# Patient Record
Sex: FEMALE | Race: WHITE | NOT HISPANIC OR LATINO | Employment: OTHER | URBAN - METROPOLITAN AREA
[De-identification: names, ages, dates, MRNs, and addresses within clinical notes are randomized per-mention and may not be internally consistent; named-entity substitution may affect disease eponyms.]

---

## 2017-01-19 ENCOUNTER — ALLSCRIPTS OFFICE VISIT (OUTPATIENT)
Dept: OTHER | Facility: OTHER | Age: 62
End: 2017-01-19

## 2017-02-06 ENCOUNTER — GENERIC CONVERSION - ENCOUNTER (OUTPATIENT)
Dept: OTHER | Facility: OTHER | Age: 62
End: 2017-02-06

## 2017-02-06 ENCOUNTER — HOSPITAL ENCOUNTER (OUTPATIENT)
Dept: RADIOLOGY | Facility: HOSPITAL | Age: 62
Discharge: HOME | End: 2017-02-06
Attending: FAMILY MEDICINE
Payer: COMMERCIAL

## 2017-02-06 ENCOUNTER — TRANSCRIBE ORDERS (OUTPATIENT)
Dept: ADMINISTRATIVE | Facility: HOSPITAL | Age: 62
End: 2017-02-06

## 2017-02-06 DIAGNOSIS — D86.9 SARCOIDOSIS: ICD-10-CM

## 2017-02-06 PROCEDURE — 71020 HB CHEST X-RAY 2VW FRONTAL&LATL: CPT

## 2017-03-24 ENCOUNTER — ALLSCRIPTS OFFICE VISIT (OUTPATIENT)
Dept: OTHER | Facility: OTHER | Age: 62
End: 2017-03-24

## 2017-03-25 ENCOUNTER — APPOINTMENT (OUTPATIENT)
Dept: LAB | Facility: HOSPITAL | Age: 62
End: 2017-03-25
Payer: COMMERCIAL

## 2017-03-25 ENCOUNTER — TRANSCRIBE ORDERS (OUTPATIENT)
Dept: ADMINISTRATIVE | Facility: HOSPITAL | Age: 62
End: 2017-03-25

## 2017-03-25 DIAGNOSIS — M54.50 LOW BACK PAIN: ICD-10-CM

## 2017-03-25 DIAGNOSIS — D86.9 SARCOIDOSIS: ICD-10-CM

## 2017-03-25 DIAGNOSIS — M54.16 RADICULOPATHY OF LUMBAR REGION: ICD-10-CM

## 2017-03-25 LAB
CRP SERPL QL: <3 MG/L
ERYTHROCYTE [SEDIMENTATION RATE] IN BLOOD: 14 MM/HOUR (ref 0–20)

## 2017-03-25 PROCEDURE — 36415 COLL VENOUS BLD VENIPUNCTURE: CPT

## 2017-03-25 PROCEDURE — 87389 HIV-1 AG W/HIV-1&-2 AB AG IA: CPT

## 2017-03-25 PROCEDURE — 86235 NUCLEAR ANTIGEN ANTIBODY: CPT

## 2017-03-25 PROCEDURE — 81374 HLA I TYPING 1 ANTIGEN LR: CPT

## 2017-03-25 PROCEDURE — 86430 RHEUMATOID FACTOR TEST QUAL: CPT

## 2017-03-25 PROCEDURE — 85652 RBC SED RATE AUTOMATED: CPT

## 2017-03-25 PROCEDURE — 86038 ANTINUCLEAR ANTIBODIES: CPT

## 2017-03-25 PROCEDURE — 86140 C-REACTIVE PROTEIN: CPT

## 2017-03-25 PROCEDURE — 86200 CCP ANTIBODY: CPT

## 2017-03-25 PROCEDURE — 82164 ANGIOTENSIN I ENZYME TEST: CPT

## 2017-03-26 LAB — CCP IGA+IGG SERPL IA-ACNC: 5 UNITS (ref 0–19)

## 2017-03-27 LAB
ACE SERPL-CCNC: 18 U/L (ref 14–82)
ENA SS-A AB SER-ACNC: <0.2 AI (ref 0–0.9)
ENA SS-B AB SER-ACNC: <0.2 AI (ref 0–0.9)
HIV 1+2 AB+HIV1 P24 AG SERPL QL IA: NORMAL
RHEUMATOID FACT SER QL LA: NEGATIVE
RYE IGE QN: NEGATIVE

## 2017-03-31 LAB — HLA-B27 QL NAA+PROBE: NEGATIVE

## 2017-07-29 ENCOUNTER — TRANSCRIBE ORDERS (OUTPATIENT)
Dept: ADMINISTRATIVE | Facility: HOSPITAL | Age: 62
End: 2017-07-29

## 2017-07-29 ENCOUNTER — HOSPITAL ENCOUNTER (OUTPATIENT)
Dept: RADIOLOGY | Facility: HOSPITAL | Age: 62
Discharge: HOME | End: 2017-07-29
Attending: FAMILY MEDICINE
Payer: COMMERCIAL

## 2017-07-29 ENCOUNTER — APPOINTMENT (OUTPATIENT)
Dept: LAB | Facility: HOSPITAL | Age: 62
End: 2017-07-29
Attending: FAMILY MEDICINE
Payer: COMMERCIAL

## 2017-07-29 DIAGNOSIS — D86.9 SARCOIDOSIS: ICD-10-CM

## 2017-07-29 DIAGNOSIS — Z79.899 OTHER LONG TERM (CURRENT) DRUG THERAPY: ICD-10-CM

## 2017-07-29 LAB
ALBUMIN SERPL BCP-MCNC: 3.7 G/DL (ref 3.5–5)
ALP SERPL-CCNC: 56 U/L (ref 46–116)
ALT SERPL W P-5'-P-CCNC: 29 U/L (ref 12–78)
AST SERPL W P-5'-P-CCNC: 21 U/L (ref 5–45)
BILIRUB DIRECT SERPL-MCNC: 0.08 MG/DL (ref 0–0.2)
BILIRUB SERPL-MCNC: 0.3 MG/DL (ref 0.2–1)
PROT SERPL-MCNC: 6.6 G/DL (ref 6.4–8.2)

## 2017-07-29 PROCEDURE — 36415 COLL VENOUS BLD VENIPUNCTURE: CPT

## 2017-07-29 PROCEDURE — 80076 HEPATIC FUNCTION PANEL: CPT

## 2017-07-29 PROCEDURE — 71020 HB CHEST X-RAY 2VW FRONTAL&LATL: CPT

## 2017-07-31 ENCOUNTER — GENERIC CONVERSION - ENCOUNTER (OUTPATIENT)
Dept: OTHER | Facility: OTHER | Age: 62
End: 2017-07-31

## 2017-10-16 ENCOUNTER — GENERIC CONVERSION - ENCOUNTER (OUTPATIENT)
Dept: OTHER | Facility: OTHER | Age: 62
End: 2017-10-16

## 2017-11-30 ENCOUNTER — ALLSCRIPTS OFFICE VISIT (OUTPATIENT)
Dept: OTHER | Facility: OTHER | Age: 62
End: 2017-11-30

## 2017-12-05 NOTE — PROGRESS NOTES
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   3  Cardiomyopathy (425 4) (I42 9)   4  Chronic low back pain (724 2,338 29) (M54 5,G89 29)   5  Sarcoidosis (135) (D86 9)    Plan  Depression    · Sertraline HCl - 50 MG Oral Tablet; take 1 tablet once a day  High risk medication use    · (1) HEPATIC FUNCTION PANEL; Status:Active - Retrospective By Protocol Authorization; Requested for:Recurring Schedule: 3/1/2018; 6/1/2018; 9/1/2018; 12/1/2018 ; Irritable bowel syndrome    · Loperamide HCl - 2 MG Oral Capsule; One tablet by mouth in the morning & 1/2  tablet in the evening   · Hyoscyamine Sulfate ER 0 375 MG Oral Tablet Extended Release 12 Hour; TAKE  1 TABLET EVERY 12 HOURS AS NEEDED  Sarcoidosis    · * XR CHEST PA & LATERAL; Status:Active - Retrospective By Protocol Authorization; Requested for:Recurring Schedule: 1/1/2018; 7/1/2018 ; Discussion/Summary    No change of medications allergic to vaccilnes  Chief Complaint  Patient is here today for follow up of chronic conditions described in HPI  History of Present Illness  The patient is being seen for follow-up of chronic back pain  The patient reports doing poorly  Comorbid Illnesses: depression  She has had no significant interval events  The patient is currently asymptomatic  No associated symptoms are reported  Medications:  the patient is adherent to her medication regimen, but she denies medication side effects  The patient is being seen for follow-up of sarcoidosis  The patient reports doing well  She has had no significant interval events  The patient is currently asymptomatic  Associated symptoms: no weight loss, no anorexia and no night sweats  Medications:  the patient is adherent to her medication regimen, but she denies medication side effects  Disease management:  The patient is doing well with her goals         The patient is being seen for a routine clinic follow-up of dilated cardiomyopathy  The patient is currently asymptomatic  No associated symptoms are reported  Review of Systems    Constitutional: No fever, no chills, feels well, no tiredness, no recent weight gain or weight loss  Eyes: No complaints of eye pain, no red eyes, no eyesight problems, no discharge, no dry eyes, no itching of eyes  ENT: no complaints of earache, no loss of hearing, no nose bleeds, no nasal discharge, no sore throat, no hoarseness  Cardiovascular: dilated cardilomyopathy  Respiratory: No complaints of shortness of breath, no wheezing, no cough, no SOB on exertion, no orthopnea, no PND  Gastrointestinal: No complaints of abdominal pain, no constipation, no nausea or vomiting, no diarrhea, no bloody stools  Genitourinary: No complaints of dysuria, no incontinence, no pelvic pain, no dysmenorrhea, no vaginal discharge or bleeding  Musculoskeletal: low back pain  Integumentary: No complaints of skin rash or lesions, no itching, no skin wounds, no breast pain or lump  Neurological: No complaints of headache, no confusion, no convulsions, no numbness, no dizziness or fainting, no tingling, no limb weakness, no difficulty walking  ROS reviewed  Active Problems    1  Back Pain   2  Bipolar 2 disorder (296 89) (F31 81)   3  Carbuncle On The Toe(S) (680 7)   4  Cardiomyopathy (425 4) (I42 9)   5  Cervical cancer screening (V76 2) (Z12 4)   6  Chronic low back pain (724 2,338 29) (M54 5,G89 29)   7  Chronic lumbar radiculopathy (724 4) (M54 16)   8  Depression (311) (F32 9)   9  Dry Skin (782 9)   10  High risk medication use (V58 69) (Z79 899)   11  Irritable bowel syndrome (564 1) (K58 9)   12  Osteomyelitis of toe (730 27) (M86 9)   13  Paroxysmal digital cyanosis (443 0) (I73 00)   14  Sarcoidosis (135) (D86 9)   15  Screening for colorectal cancer (V76 51) (Z12 11,Z12 12)   16  TMJ Appearance Right    Past Medical History    1  Diarrhea (787 91) (R19 7)   2   History of hypertension (V12 59) (Z86 79)    The active problems and past medical history were reviewed and updated today  Surgical History    1  History of Anterior Colporrhaphy, Repair Of Cystocele   2  History of Bladder Surgery   3  History of Cataract Extraction   4  History of Hysterectomy   5  History of Oophorectomy   6  History of Repair Of Retinal Detachment Left Eye    The surgical history was reviewed and updated today  Family History  Mother    1  Family history of Abd Aorta Aneurysm Repair 2 Dock Limbs W/ Visc Extens Prosth  Father    2  Family history of hypertension (V17 49) (Z82 49)   3  Family history of Subarachnoid hemorrhage    The family history was reviewed and updated today  Social History    · Never a smoker   · Never Drank Alcohol  The social history was reviewed and updated today  The social history was reviewed and is unchanged  Current Meds   1  Calcium + D TABS; TAKE 2 TABLET Twice daily At 8AM-8PM Recorded   2  [employer] Fish Oil CAPS; TAKE 2 CAPSULE Daily Recorded   3  Daily Value Multivitamin Oral Tablet; Take 2 tablet daily Vianca-Paty Som vitamin w/iron   Recorded   4  Hyoscyamine Sulfate ER 0 375 MG Oral Tablet Extended Release 12 Hour; TAKE 1   TABLET EVERY 12 HOURS AS NEEDED; Therapy: 45PVK2016 to (Evaluate:97Dbz6989)  Requested for: 23HLQ7357; Last   Rx:04Jan2017 Ordered   5  Imodium A-D 2 MG Oral Tablet; 1  in am and 1/2 tab at night  Requested for: 83ADQ0116;   Last Rx:19Jan2017 Ordered   6  Mavik 2 MG TABS (Trandolapril); Take 1 tablet daily Recorded   7  Refresh Tears SOLN Recorded   8  Sertraline HCl - 50 MG Oral Tablet; take 1 tablet once a day; Therapy: 98HAD1777 to (Evaluate:02Czr8504)  Requested for: 29EJE6272; Last   Rx:04Jan2017 Ordered   9  Topiramate 25 MG Oral Tablet; TAKE 1 TABLET Bedtime; Therapy: 10PBX3992 to (Evaluate:22Mar2018)  Requested for: 28Mar2017; Last   Rx:27Mar2017 Ordered   10   Toprol XL 50 MG Oral Tablet Extended Release 24 Hour (Metoprolol Succinate ER); 1/2    tablet BID; Therapy: 97SHJ0015 to (Last Rx:71Iqh2988)  Requested for: 49COS4282 Ordered   11  Tylenol Arthritis Pain 650 MG TBCR; TAKE 1 TABLET Twice daily PRN Mild pain Recorded   12  Vitamin D 1000 UNIT CAPS; Take as directed Recorded    The medication list was reviewed and updated today  Allergies    1  Fluzone INJ    2  Dust   3  Grass   4  Mold   5  Trees    Vitals  Vital Signs    Recorded: 62GIY1439 69:56SR   Systolic 971   Diastolic 70   Height 5 ft 8 in   Weight 146 lb 3 2 oz   BMI Calculated 22 23   BSA Calculated 1 79     Physical Exam    Constitutional   General appearance: No acute distress, well appearing and well nourished  Eyes   Conjunctiva and lids: No swelling, erythema or discharge  Pupils and irises: Equal, round and reactive to light  Ears, Nose, Mouth, and Throat   External inspection of ears and nose: Normal     Otoscopic examination: Tympanic membranes translucent with normal light reflex  Canals patent without erythema  Nasal mucosa, septum, and turbinates: Normal without edema or erythema  Oropharynx: Normal with no erythema, edema, exudate or lesions  Pulmonary   Respiratory effort: No increased work of breathing or signs of respiratory distress  Auscultation of lungs: Clear to auscultation  Cardiovascular   Palpation of heart: Normal PMI, no thrills  Auscultation of heart: Normal rate and rhythm, normal S1 and S2, without murmurs  Examination of extremities for edema and/or varicosities: Normal     Carotid pulses: Normal     Abdomen   Abdomen: Non-tender, no masses  Liver and spleen: No hepatomegaly or splenomegaly  Lymphatic   Palpation of lymph nodes in neck: No lymphadenopathy  Musculoskeletal   Gait and station: Normal     Digits and nails: Normal without clubbing or cyanosis      Inspection/palpation of joints, bones, and muscles: Normal     Skin   Skin and subcutaneous tissue: Normal without rashes or lesions  Neurologic   Cranial nerves: Cranial nerves 2-12 intact  Reflexes: 2+ and symmetric  Sensation: No sensory loss  Psychiatric   Orientation to person, place, and time: Normal     Mood and affect: Normal          Health Management  Health Maintenance   * Dexa Scan Axial Skel (Hip, Pelvis, Spine); every 2 years; Last 90Coa6936;  Next Due: 27Prd4181;  Near Due    Signatures   Electronically signed by : Lorena De Santiago DO; Nov 30 2017 12:12PM EST                       (Author)

## 2017-12-12 ENCOUNTER — HOSPITAL ENCOUNTER (OUTPATIENT)
Dept: MAMMOGRAPHY | Facility: HOSPITAL | Age: 62
Discharge: HOME | End: 2017-12-12
Attending: FAMILY MEDICINE
Payer: COMMERCIAL

## 2017-12-12 ENCOUNTER — GENERIC CONVERSION - ENCOUNTER (OUTPATIENT)
Dept: OTHER | Facility: OTHER | Age: 62
End: 2017-12-12

## 2017-12-12 ENCOUNTER — GENERIC CONVERSION - ENCOUNTER (OUTPATIENT)
Dept: FAMILY MEDICINE CLINIC | Facility: CLINIC | Age: 62
End: 2017-12-12

## 2017-12-12 DIAGNOSIS — Z12.31 ENCOUNTER FOR SCREENING MAMMOGRAM FOR MALIGNANT NEOPLASM OF BREAST: ICD-10-CM

## 2017-12-12 PROCEDURE — G0202 SCR MAMMO BI INCL CAD: HCPCS

## 2017-12-12 PROCEDURE — 77063 BREAST TOMOSYNTHESIS BI: CPT

## 2018-01-01 DIAGNOSIS — D86.9 SARCOIDOSIS: ICD-10-CM

## 2018-01-10 NOTE — RESULT NOTES
Verified Results  VAS UPPER EXTREMITY ARTERIAL, LIMITED, UNILATERAL, ALLENS TEST [id number] 11:58AM Gill Daniel Order Number: [id number]     Test Name Result Flag Reference   VAS UPPER EXTREMITY ARTERIAL, LIMITED, UNILATERAL, [facility] TEST (Report)     [facility] REPORT   CLINICAL:   Indications: Left 3rd digit numbness and coldness  Patient states discoloration of the digit last night  Patient states it turned   from red to blue and was followed by numbness  Hx of sarcoidosis and cardiomyopathy  The patient has no history of hypertension or diabetes  Right Pressure: 114/ mm Hg, Left Pressure: / mm Hg  FINDINGS:      Segment       Right      Left                      Pressure (mmHg) Impression Pressure (mmHg) PSV (cm/s)    Subclavian                                 88    Upper Arm            114             119          Forearm             112              94          Axillary, 2nd Part          Normal                58    Prox  Brachial            Normal                64    Dist  Radial Artery          Normal                38    Mid Brachial             Normal               110    Dist  Brachial            Normal                96    Dist  Ulnar Artery          Stenosis               44    Prox  Radial             Normal                54    Mid Radial              Normal                75    1st Digit             93              59          Prox  Ulnar              Normal                59    Mid  Ulnar              Normal                49    Axillary               100%                 58             CONCLUSION:   Impression   RIGHT   FA/UA index is 0 98, normal   The third finger pressure is 98mmHg  LEFT   There is no evidence of significant arterial occlusive disease  FA/UA index is 0 79,   The third finger pressure is 59mmHg  mildly decreased compared with the right           Tech Note: Preliminary report given to Dr Alyssa Marley ,patient will be scheduled to   see Vascular surgeon        SIGNATURE:   Electronically Signed by: José Nguyen MD on 2016-02-22 10:18:50 PM

## 2018-01-10 NOTE — RESULT NOTES
Verified Results  * XR CHEST PA & LATERAL [id number] 10:48AM Vitor Moreno Order Number: [id number]     Test Name Result Flag Reference   XR CHEST PA & LATERAL (Report)     CHEST     INDICATION: Follow up sarcoidosis     COMPARISON: None     VIEWS: PA and lateral; 2 images     FINDINGS:     The cardiomediastinal silhouette is unremarkable  No developing hilar or paratracheal lymph node enlargement seen  The lungs are clear  No pulmonary nodules  No pleural effusions  Bony thorax unremarkable  IMPRESSION:     No active pulmonary disease          Workstation performed: [id number]     Signed by:   Leonor Patel MD   2/6/17

## 2018-01-12 VITALS
DIASTOLIC BLOOD PRESSURE: 78 MMHG | HEIGHT: 68 IN | HEART RATE: 80 BPM | SYSTOLIC BLOOD PRESSURE: 128 MMHG | WEIGHT: 141 LBS | BODY MASS INDEX: 21.37 KG/M2

## 2018-01-12 NOTE — RESULT NOTES
Verified Results  (1) ACUTE HEPATITIS PANEL [id number] 09:53AM [place]     Test Name Result Flag Reference   HEPATITIS B SURFACE ANTIGEN Non-reactive  Non-reactive, NonReactive - Confirmed   HEPATITIS A IGM ANTIBODY Non-reactive  Non-reactive, Equivocal-Suggest Recollect   HEPATITIS C ANTIBODY Non-reactive  Non-reactive   HEPATITIS B CORE IGM ANTIBODY Non-reactive  Non-reactive

## 2018-01-12 NOTE — PROGRESS NOTES
Assessment    1  Sarcoidosis (135) (D86 9)   2  Chronic lumbar radiculopathy (724 4) (M54 16)   3  Chronic low back pain (724 2,338 29) (M54 5,G89 29)   4  Cardiomyopathy (425 4) (I42 9)   5  Bipolar 2 disorder (296 89) (F31 81)   6  Depression (311) (F32 9)   7  Irritable bowel syndrome (564 1) (K58 9)    Plan  Chronic low back pain    · Call [phone] if: The pain seems worse ; Status:Complete;   Done: 01PVL6253   · Call [phone] if: The symptoms seem worse ; Status:Complete;   Done:  07ZGV0777   · Call [phone] if: You have questions or concerns about your problem ;  Status:Complete;   Done: 48VID9914  Chronic low back pain, Chronic lumbar radiculopathy, Sarcoidosis    · (1) KASSANDRA SCREEN W/REFLEX TO TITER/PATTERN; Status:Active; Requested  for:24Mar2017;    · (1) ANGIOTENSIN CONVERSIN ENZYME; Status:Active; Requested for:24Mar2017;    · (1) C-REACTIVE PROTEIN; Status:Active; Requested CXT:57DPO2330;    · (1) CYCLIC CITRULLINATED PEPTIDE; Status:Active; Requested for:24Mar2017;    · (1) HIV AG/AB COMBO, 4TH GEN; [Do Not Release]; Status:Active; Requested  for:24Mar2017;    · (1) HLA B27; Status:Active; Requested for:24Mar2017;    · (1) RHEUMATOID FACTOR SCREEN; Status:Active; Requested for:24Mar2017;    · (1) SED RATE; Status:Active; Requested for:24Mar2017;    · (1) SJOGRENS ANTIBODIES; Status:Active; Requested for:24Mar2017;    · Follow-up PRN Evaluation and Treatment  Follow-up  Status: Complete  Done:  78GOT8471    Discussion/Summary    60year old female PMH of chronic back pain since childhood and hx of sarcoidosis here for evaluation of her back pain  At this time, patient does not report significant pain in any of her other joints  She has been seeing pain management Dr Lillian Melton for epidural injections which has been providing relief of pain   On exam, she does display some mild paraspinal tenderness of her lower back region, however no other significant joint tenderness, swelling or active synovitis noted  On review of her previous MRI records that patient brought in today, it is noted that patient does have significant degenerative disc disease from L1-L5  There are two XRAY reports that state DJD from L1-L6, however personally reviewed this and it does not appear that the patient has an additional lumbar vertebrae  At this time, her sarcoidosis does not seem to be active and is currently not on any medications for this  Her recent CXR shows a normal study  I suspect that her back pain is less likely from her underlying sarcoidosis, and more likely from degenerative disc disease/osteoarthritis  At this time, we will order additional blood studies to r/o any connective tissue disease/rheumatological d/o contributing to her sx  Should these results be consistent with an underlying rheumatological processes, will contact patient and have her follow up for further work up  Should the work up be negative, did discuss with the patient to follow up with her pain specialist for ongoing management of her pain  In the interim, patient will contact the office should she have any questions or concerns  Patient is able to Self-Care  Counseling  Rheumatology Counseling Documentation: The patient and patient's family was counseled regarding diagnostic results, instructions for management and impressions  Chief Complaint  establish care for "arthritis"      History of Present Illness  60year old female PMH of chronic back pain since childhood, at age 33 noted to have short leg syndrome on right by chiropractor, had manipulation with this and PT and was told her short leg syndrome improved  However, since then has always had pain and issues with her back  Has been seeing pain specialist for her back, has been getting steroid injections for this  Last injection was 12/2017 in the SI joint by Dr Fahad Tubbs in [facility]   Patient saw Dr Johnny Dan in [place] due to ongoing pain in her back, and felt that her back pain may be due to underlying sarcoidosis, and therefore was referred her for further evaluation  Patient reports that pain is most bothersome in lower back, and pain shoots down to R hip and to the R knee, occasionally can have radiation to the left side  Has had multiple MRIs of her L-spine and C-spine, patient has brought the reports with her today and these were reviewed  Takes Tylenol arthritis 650mg BID prn, which does provide relief of pain  Reports pain is worst when climbing up stairs/walking up the hill  Patient states was diagnosed with sarcoidosis approx 10 years, started off with idiopathic cardiomyopathy by her cardiologist, who suspected she may have had an underlying lymphoma or sarcoid, subsequently had a biopsy of her lymph nodes which showed sarcoidosis  Patient is currently not on medications for her sarcoidosis, she undergoes surveillance with biannual CXRs  Review of Systems    Constitutional: no fever, no recent weight gain, fatigue, no chills, no recent weight loss and no anorexia  HEENT: no blurred vision, no double vision, no amaurosis fugax, no eye pain, no erythema eye(s), no dryness mouth, no mouth sores, not feeling congested, no epistaxis and no sore throat    The patient presents with complaints of dryness of the eyes  Symptoms are improved by artificial tears  Cardiovascular: no chest pain or pressure, no palpitations present, no orthopnea and no swelling in the arms or legs  Respiratory: no unusual or persistent cough, no sputum, no shortness of breath, no hemoptysis and no wheezing  Gastrointestinal: no abdominal pain, no nausea, no vomiting, no dysphagia, no heartburn, no diarrhea, no constipation and no melena  Genitourinary: no dysuria, no hematuria, no increase in frequency and no feelings of urinary urgency  Musculoskeletal: arthralgias, pain while walking and head/neck/back pain, but no joint swelling and no myalgias  Integumentary no rash, no Raynaud's, no nodules, no alopecia, no nail changes, no skin thickening and no photosensitivity  Endocrine no heat or cold intolerance, no excessive sweating, no polyuria, no polydipsia and no polyphagia  Hematologic/Lymphatic: no unusual bleeding and no tendency for easy bruising  Neurological: no headache, no vertigo or dizziness, no tingling and no weakness  Psychiatric: anxiety, but no insomnia, no non-restorative sleep and no depression  ROS reviewed  Active Problems    1  Back Pain   2  Bipolar 2 disorder (296 89) (F31 81)   3  Carbuncle On The Toe(S) (680 7)   4  Cardiomyopathy (425 4) (I42 9)   5  Cervical cancer screening (V76 2) (Z12 4)   6  Depression (311) (F32 9)   7  Dry Skin (782 9)   8  High risk medication use (V58 69) (Z79 899)   9  Irritable bowel syndrome (564 1) (K58 9)   10  Osteomyelitis of toe (730 27) (M86 9)   11  Paroxysmal digital cyanosis (443 0) (I73 00)   12  Sarcoidosis (135) (D86 9)   13  Screening for colorectal cancer (V76 51) (Z12 11,Z12 12)    Past Medical History    1  Diarrhea (787 91) (R19 7)   2  History of hypertension (V12 59) (Z86 79)    Surgical History    1  History of Anterior Colporrhaphy, Repair Of Cystocele   2  History of Bladder Surgery   3  History of Cataract Extraction   4  History of Hysterectomy   5  History of Oophorectomy   6  History of Repair Of Retinal Detachment Left Eye    Family History  Mother    1  Family history of Abd Aorta Aneurysm Repair 2 Dock Limbs W/ Visc Extens Prosth  Father    2  Family history of hypertension (V17 49) (Z82 49)   3  Family history of Subarachnoid hemorrhage    Social History    · Never a smoker   · Never Drank Alcohol    Current Meds   1  Calcium + D TABS; TAKE 2 TABLET Twice daily At 8AM-8PM Recorded   2  [employer] Fish Oil CAPS; TAKE 2 CAPSULE Daily Recorded   3  Daily Value Multivitamin Oral Tablet; Take 2 tablet daily Vianca-Paty Shaklee vitamin w/iron   Recorded   4   Hyoscyamine Sulfate ER 0 375 MG Oral Tablet Extended Release 12 Hour; TAKE 1   TABLET EVERY 12 HOURS AS NEEDED; Therapy: 94YCG2981 to (Evaluate:29Dec2017)  Requested for: 43DBC7650; Last   Rx:04Jan2017 Ordered   5  Imodium A-D 2 MG Oral Tablet; 1  in am and 1/2 tab at night  Requested for: 11HEX0590;   Last Rx:19Jan2017 Ordered   6  Mavik 2 MG Oral Tablet; Take 1 tablet daily Recorded   7  Refresh Tears SOLN Recorded   8  Sertraline HCl - 50 MG Oral Tablet; take 1 tablet once a day; Therapy: 82QOT2531 to (Evaluate:29Dec2017)  Requested for: 28NFK1910; Last   Rx:04Jan2017 Ordered   9  Topiramate 25 MG Oral Tablet; TAKE 1 TABLET Bedtime; Therapy: 77RMV2802 to (Evaluate:07Jan2017)  Requested for: 66RBS0673; Last   Rx:13Jan2016 Ordered   10  Toprol XL 50 MG Oral Tablet Extended Release 24 Hour; 1/2 tablet BID; Therapy: 27CVN4616 to (Last Rx:09Kiw4771)  Requested for: 22OCR3558 Ordered   11  Tylenol Arthritis Pain 650 MG TBCR; TAKE 1 TABLET Twice daily PRN Mild pain Recorded   12  Vitamin D 1000 UNIT CAPS; Take as directed Recorded    Allergies    1  Fluzone INJ    2  Dust   3  Grass   4  Mold   5  Trees    Vitals  Signs   Recorded: 82OUL4861 09:46AM   Heart Rate: 80  Systolic: 034  Diastolic: 78  Height: 5 ft 8 in  Weight: 141 lb   BMI Calculated: 21 44    Physical Exam    Constitutional   General appearance: No acute distress, well appearing and well nourished  Eyes   Conjunctiva and lids: No swelling, erythema or discharge  Ears, Nose, Mouth, and Throat   Oropharynx: Normal with no erythema, edema, exudate lesions, or ulcers  Pulmonary   Respiratory effort: No increased work of breathing or signs of respiratory distress  Auscultation of lungs: Clear to auscultation  Cardiovascular   Auscultation of heart: Normal rate and rhythm, normal S1 and S2, without murmurs  Examination of extremities for edema and/or varicosities: Normal     Lymphatic   Palpation of lymph nodes in neck: No lymphadenopathy  Psychiatric   Orientation to person, place, and time: Normal     Mood and affect: Normal         Right Upper Extremity: Normal examination  Normal ROM  Normal strength  Left Upper Extremity: Normal examination  Normal ROM  Normal strength  Right Lower Extremity: Normal examination  Normal ROM  Normal strength  Left Lower Extremity: Normal examination  Normal ROM  Constitutional - General appearance: Normal    Musculoskeletal - Gait and station: Normal  Digits and nails: Normal  Joints, bones, and muscles: Abnormal  (tenderness to palpation of lower back) Muscle strength/tone: Normal    Skin - Skin and subcutaneous tissue: Normal    Neurologic - Sensation: Normal      Right hand: All MCP, PIP and DIP joints are normal  Left Hand: All MCP, PIP and DIP joints are normal       Results/Data  * XR CHEST PA & LATERAL [id number] 10:48AM Dunia Chawla   TW Order Number: [id number]     Test Name Result Flag Reference   XR CHEST PA & LATERAL (Report)     CHEST     INDICATION: Follow up sarcoidosis     COMPARISON: None     VIEWS: PA and lateral; 2 images     FINDINGS:     The cardiomediastinal silhouette is unremarkable  No developing hilar or paratracheal lymph node enlargement seen  The lungs are clear  No pulmonary nodules  No pleural effusions  Bony thorax unremarkable  IMPRESSION:     No active pulmonary disease          Workstation performed: [id number]     Signed by:   Simone Gibbs MD   2/6/17     (1) ACUTE HEPATITIS PANEL [id number] 09:53AM Dunia Chawla     Test Name Result Flag Reference   HEPATITIS B SURFACE ANTIGEN Non-reactive  Non-reactive, NonReactive - Confirmed   HEPATITIS A IGM ANTIBODY Non-reactive  Non-reactive, Equivocal-Suggest Recollect   HEPATITIS C ANTIBODY Non-reactive  Non-reactive   HEPATITIS B CORE IGM ANTIBODY Non-reactive  Non-reactive     * XR SPINE LUMBAR MINIMUM 4 VIEWS NON INJURY [id number] 10:48AM EPIC, Provider   Test ordered by: Herminia Simeon Name Result Flag Reference   XR SPINE LUMBAR MINIMUM 4 VIEWS (Report)     LUMBAR SPINE     INDICATION: Chronic low back pain     COMPARISON: March 3, 2009     VIEWS: AP, lateral and bilateral oblique projections; 4 images     FINDINGS: There are 6 nonrib-bearing vertebral segments labeled L1 through L6  Alignment is unremarkable  There is no radiographic evidence of acute fracture or destructive osseous lesion  Narrowing of the L3-4, L4-5 and L5 L6 intervertebral disc spaces is seen, similar to prior        Visualized soft tissues appear unremarkable  IMPRESSION:   6 nonrib-bearing segments  Multilevel degenerative disc disease, L2-3 through L5-L6  Workstation performed: [id number]     Signed by:   Conchita Vargas MD   9/7/16       Attending Note  Attending Note: I interviewed and examined the patient, I discussed the case with the Resident and reviewed the Resident's note, I supervised the Resident and I agree with the Resident management plan as it was presented to me  Level of Participation: I was present in clinic and examined the patient  Patient's History: Ms Alondra Ley is a 41-year-old  female who presents the office with chronic lower back pain since childhood, which has worsened into her adulthood  She was diagnosed with "short leg syndrome" at the age of 33 by a chiropractor and underwent manipulation for over a year before improvement  She also had concurrent physical therapy  Since that time, she has had persistent pain and issues with her back  She has been seeing a pain management specialist for her back and has been receiving corticosteroid injections  Her last injection occurred in December 2016 in the SI joint  She did follow-up with her primary care physician for her ongoing issues with her back, and was told that her back pain may be due to her known history of sarcoidosis  She was therefore referred to rheumatology for further evaluation   She states that she was diagnosed with sarcoidosis incidentally when she was found to have enlarged lymph nodes on a chest x-ray  A biopsy confirmed the diagnosis  She has never been on any treatment for sarcoidosis, as she has been asymptomatic  She is followed with chest x-rays every 6 months, which have all been negative in recent years  In addition to her lower back pain, she does report radiation of pain into her right hip and right knee as well as occasional radiation into her left side  She has been utilizing Tylenol arthritis 650 mg twice a day as needed for pain with some relief  She reports that her pain is worse when climbing stairs or walking up hills  She also carries a diagnosis of an idiopathic cardiomyopathy  During the workup for this is when sarcoidosis was found  Key Parts of the Exam: On exam, there is no active synovitis  She does have mild osteoarthritic changes of the hands, as well as crepitus of the bilateral knees  There is significant paraspinal spasm noted in the lumbar spine area  Review of multiple x-rays and MRIs of her back did show multilevel degenerative changes  A recent acute hepatitis panel was negative  Her most recent chest x-ray was also negative for any hilar adenopathy  Diagnosis and Plan: At this time, her history, exam, laboratory, and imaging studies do appear most consistent with chronic lower back pain most likely due to degenerative disc disease and degenerative joint disease  It would be fairly unusual for sarcoidosis to only involve the lumbar spine, as sarcoid arthritis typically involves smaller joints, including the hands, wrists, ankles, and feet  She has no evidence of synovitis in any of these areas  Nevertheless, I will check several laboratory studies to further investigate the cause of her symptoms  I advised her to contact me approximately 1 week after the laboratory studies are drawn so we may review them and make further clinical decisions   If any evidence of an inflammatory process is noted, we will treat it accordingly  Otherwise, we will defer management to Dr Vivek Hall of pain management  She will call if she has any additional questions or concerns  I agree with the Resident's note        Signatures   Electronically signed by : Fab Guerin DO; Mar 24 2017  4:26PM EST                       (Author)

## 2018-01-13 VITALS
WEIGHT: 146.2 LBS | DIASTOLIC BLOOD PRESSURE: 70 MMHG | SYSTOLIC BLOOD PRESSURE: 114 MMHG | HEIGHT: 68 IN | BODY MASS INDEX: 22.16 KG/M2

## 2018-01-14 VITALS
WEIGHT: 144.8 LBS | DIASTOLIC BLOOD PRESSURE: 62 MMHG | BODY MASS INDEX: 21.95 KG/M2 | HEIGHT: 68 IN | SYSTOLIC BLOOD PRESSURE: 108 MMHG

## 2018-01-14 NOTE — RESULT NOTES
Verified Results  (1) HEPATIC FUNCTION PANEL [id number] 11:15AM Jamari Thurman    Order Number: [id number]     Test Name Result Flag Reference   ALBUMIN 3 7 g/dL  3 5-5 0   - Patient Instructions:  This is a non fasting blood test  Please drink two glasses of water the morning of test    ALK PHOSPHATAS 56 U/L     ALT (SGPT) 29 U/L  12-78   AST(SGOT) 21 U/L  5-45   BILI, DIRECT 0 08 mg/dL  0 00-0 20   BILI, TOTAL 0 30 mg/dL  0 20-1 00   TOTAL PROTEIN 6 6 g/dL  6 4-8 2

## 2018-01-16 NOTE — RESULT NOTES
Verified Results  (1) ACUTE HEPATITIS PANEL [id number] 11:44AM Claire Lemon     Test Name Result Flag Reference   HEPATITIS B SURFACE ANTIGEN Non-reactive  Non-reactive, NonReactive - Confirmed   HEPATITIS A IGM ANTIBODY Non-reactive  Non-reactive, Equivocal-Suggest Recollect   HEPATITIS C ANTIBODY Non-reactive  Non-reactive   HEPATITIS B CORE IGM ANTIBODY Non-reactive  Non-reactive     * XR CHEST PA & LATERAL [id number] 11:09AM Carlyon Lemon     Test Name Result Flag Reference   XR CHEST PA & LATERAL (Report)     CHEST     INDICATION: Coagulation defect  Six-month follow-up     COMPARISON: July 25, 2015     VIEWS: PA and lateral; 2 images     FINDINGS:     The cardiomediastinal silhouette is unremarkable  The lungs are clear  No pleural effusions  Bony thorax unremarkable  IMPRESSION:     No active pulmonary disease                 Workstation performed: [id number]     Signed by:   Linda Benjamin MD   2/29/16

## 2018-01-16 NOTE — RESULT NOTES
Verified Results  (1) ACUTE HEPATITIS PANEL [id number] 11:53AM Eliud Mini     Test Name Result Flag Reference   HEPATITIS B SURFACE ANTIGEN Non-reactive  Non-reactive, NonReactive - Confirmed   HEPATITIS A IGM ANTIBODY Non-reactive  Non-reactive, Equivocal-Suggest Recollect   HEPATITIS C ANTIBODY Non-reactive  Non-reactive   HEPATITIS B CORE IGM ANTIBODY Non-reactive  Non-reactive     * XR CHEST PA & LATERAL [id number] 11:41AM Eliud Mini     Test Name Result Flag Reference   XR CHEST PA & LATERAL (Report)     CHEST     INDICATION: Right dilatation defect  History of sarcoidosis  COMPARISON: February 27, 2016     VIEWS: PA and lateral; 2 images     FINDINGS:     The cardiomediastinal silhouette is unremarkable  The lungs are clear  No pleural effusions  Bony thorax unremarkable  IMPRESSION:     No active pulmonary disease          Workstation performed: [id number]     Signed by:   Sal Barfield MD   7/25/16

## 2018-01-16 NOTE — RESULT NOTES
Verified Results  * XR CHEST PA & LATERAL [id number] 10:59AM Alannah Escudero Order Number: [id number]     Test Name Result Flag Reference   XR CHEST PA & LATERAL (Report)     CHEST     INDICATION: Follow up sarcoidosis     COMPARISON: February 6, 2017     VIEWS: PA and lateral      IMAGES: 2     FINDINGS:     The cardiomediastinal silhouette is unremarkable  The lungs are clear  No pleural effusions  Bony thorax unremarkable  IMPRESSION:     No active pulmonary disease          Workstation performed: [id number]     Signed by:   Thee Soria MD   7/31/17

## 2018-01-23 NOTE — RESULT NOTES
Verified Results  MAMMO SCREENING BILATERAL W 3D & CAD [id number] 10:19AM Joslyn Weiner Order Number: [id number]    - Patient Instructions: To schedule this appointment, please contact Central Scheduling at [phone]  Do not wear any perfume, powder, lotion or deodorant on breast or underarm area  Please bring your doctors order, referral (if needed) and insurance information with you on the day of the test  Failure to bring this information may result in this test being rescheduled  Arrive 15 minutes prior to your appointment time to register  On the day of your test, please bring any prior mammogram or breast studies with you that were not performed at a [facility]  Failure to bring prior exams may result in your test needing to be rescheduled  Test Name Result Flag Reference   MAMMO SCREENING BILATERAL W 3D & CAD (Report)     Patient History:   Patient is postmenopausal    No known family history of cancer  Took estrogen for 5 years  Patient has never smoked  Reason for exam: screening, asymptomatic  Mammo Screening Bilateral W DBT and CAD: December 12, 2017 -    Check In #: [de-identified]   2D/3D Procedure   3D views: Bilateral MLO view(s) were taken  2D views: Bilateral CC view(s) were taken  Technologist: PAIGE Gleason (PAIGE)(M)   Prior study comparison: December 12, 2016, mammo screening    bilateral W DBT and CAD performed at [address] [place] [address]  December 15, 2015, bilateral digital screening mammogram   performed at [address] [place] [address]  December 11, 2014, bilateral digital screening mammogram performed at [address] [place] [address]  December 10, 2013, bilateral    digital screening mammogram performed at [address] [place] [address]  December 17, 2012, bilateral digital screening    mammogram performed at [address] [place] [address]  The breast tissue is almost entirely fat   A combination of mediolateral oblique 3D tomographic slices as well as standard    two-dimensional orthogonal images were obtained  No new dominant    soft tissue mass, architectural distortion or suspicious    calcifications are noted  The skin and nipple structures are    within normal limits  Stable nodule present in the left breast      No mammographic evidence of malignancy  No    significant changes when compared with prior studies  ACR BI-RADSï¾® Assessments: BiRad:2 - Benign     Recommendation:   Routine screening mammogram of both breasts in 1 year  The patient is scheduled in a reminder system for screening    mammography  8-10% of cancers will be missed on mammography  Management of a    palpable abnormality must be based on clinical grounds  Patients    will be notified of their results via letter from our facility  Accredited by [employer] of [employer] and FDA       Transcription Location: [facility] 98: [id number]     Risk Value(s):   Tyrer-Cuzick 10 Year: 2 800%, Tyrer-Cuzick Lifetime: 6 400%,    Myriad Table: 1 5%, JULIA 5 Year: 1 7%, NCI Lifetime: 7 4%   Signed by:   Everett Terry MD   12/12/17

## 2018-03-26 DIAGNOSIS — G43.709 CHRONIC MIGRAINE WITHOUT AURA WITHOUT STATUS MIGRAINOSUS, NOT INTRACTABLE: Primary | ICD-10-CM

## 2018-03-26 RX ORDER — TOPIRAMATE 25 MG/1
25 TABLET ORAL DAILY
Qty: 30 TABLET | Refills: 0 | Status: SHIPPED | OUTPATIENT
Start: 2018-03-26 | End: 2018-04-25 | Stop reason: SDUPTHER

## 2018-03-26 RX ORDER — TOPIRAMATE 25 MG/1
1 TABLET ORAL
COMMUNITY
Start: 2012-10-04 | End: 2018-03-26 | Stop reason: SDUPTHER

## 2018-04-25 DIAGNOSIS — G43.709 CHRONIC MIGRAINE WITHOUT AURA WITHOUT STATUS MIGRAINOSUS, NOT INTRACTABLE: ICD-10-CM

## 2018-04-25 RX ORDER — TOPIRAMATE 25 MG/1
25 TABLET ORAL DAILY
Qty: 90 TABLET | Refills: 1 | Status: SHIPPED | OUTPATIENT
Start: 2018-04-25 | End: 2018-10-18 | Stop reason: SDUPTHER

## 2018-06-16 ENCOUNTER — LAB (OUTPATIENT)
Dept: LAB | Facility: HOSPITAL | Age: 63
End: 2018-06-16
Attending: FAMILY MEDICINE
Payer: COMMERCIAL

## 2018-06-16 ENCOUNTER — HOSPITAL ENCOUNTER (OUTPATIENT)
Dept: RADIOLOGY | Facility: HOSPITAL | Age: 63
Discharge: HOME | End: 2018-06-16
Attending: FAMILY MEDICINE
Payer: COMMERCIAL

## 2018-06-16 DIAGNOSIS — D86.9 SARCOIDOSIS: ICD-10-CM

## 2018-06-16 DIAGNOSIS — Z79.899 OTHER LONG TERM (CURRENT) DRUG THERAPY: ICD-10-CM

## 2018-06-16 LAB
ALBUMIN SERPL BCP-MCNC: 3.7 G/DL (ref 3.5–5)
ALP SERPL-CCNC: 62 U/L (ref 46–116)
ALT SERPL W P-5'-P-CCNC: 33 U/L (ref 12–78)
AST SERPL W P-5'-P-CCNC: 26 U/L (ref 5–45)
BILIRUB DIRECT SERPL-MCNC: 0.09 MG/DL (ref 0–0.2)
BILIRUB SERPL-MCNC: 0.4 MG/DL (ref 0.2–1)
PROT SERPL-MCNC: 6.5 G/DL (ref 6.4–8.2)

## 2018-06-16 PROCEDURE — 80076 HEPATIC FUNCTION PANEL: CPT

## 2018-06-16 PROCEDURE — 36415 COLL VENOUS BLD VENIPUNCTURE: CPT

## 2018-06-16 PROCEDURE — 71046 X-RAY EXAM CHEST 2 VIEWS: CPT

## 2018-10-18 ENCOUNTER — OFFICE VISIT (OUTPATIENT)
Dept: FAMILY MEDICINE CLINIC | Facility: CLINIC | Age: 63
End: 2018-10-18
Payer: COMMERCIAL

## 2018-10-18 VITALS
HEIGHT: 68 IN | SYSTOLIC BLOOD PRESSURE: 104 MMHG | BODY MASS INDEX: 21.82 KG/M2 | WEIGHT: 144 LBS | DIASTOLIC BLOOD PRESSURE: 64 MMHG

## 2018-10-18 DIAGNOSIS — K58.0 IRRITABLE BOWEL SYNDROME WITH DIARRHEA: ICD-10-CM

## 2018-10-18 DIAGNOSIS — Z51.81 ENCOUNTER FOR THERAPEUTIC DRUG MONITORING: ICD-10-CM

## 2018-10-18 DIAGNOSIS — D86.9 SARCOIDOSIS: Primary | ICD-10-CM

## 2018-10-18 DIAGNOSIS — G43.709 CHRONIC MIGRAINE WITHOUT AURA WITHOUT STATUS MIGRAINOSUS, NOT INTRACTABLE: ICD-10-CM

## 2018-10-18 PROCEDURE — 1036F TOBACCO NON-USER: CPT | Performed by: FAMILY MEDICINE

## 2018-10-18 PROCEDURE — 99214 OFFICE O/P EST MOD 30 MIN: CPT | Performed by: FAMILY MEDICINE

## 2018-10-18 PROCEDURE — 3008F BODY MASS INDEX DOCD: CPT | Performed by: FAMILY MEDICINE

## 2018-10-18 RX ORDER — METOPROLOL SUCCINATE 50 MG/1
0.5 TABLET, EXTENDED RELEASE ORAL 2 TIMES DAILY
COMMUNITY
Start: 2011-07-06

## 2018-10-18 RX ORDER — SENNOSIDES 8.6 MG
CAPSULE ORAL 2 TIMES DAILY
COMMUNITY
End: 2019-10-31 | Stop reason: ALTCHOICE

## 2018-10-18 RX ORDER — CARBOXYMETHYLCELLULOSE SODIUM 5 MG/ML
SOLUTION/ DROPS OPHTHALMIC
COMMUNITY

## 2018-10-18 RX ORDER — ONDANSETRON 4 MG/1
4 TABLET, FILM COATED ORAL EVERY 8 HOURS PRN
COMMUNITY
End: 2018-10-18 | Stop reason: SDUPTHER

## 2018-10-18 RX ORDER — ONDANSETRON 4 MG/1
4 TABLET, FILM COATED ORAL EVERY 8 HOURS PRN
Qty: 30 TABLET | Refills: 3 | Status: SHIPPED | OUTPATIENT
Start: 2018-10-18

## 2018-10-18 RX ORDER — LANOLIN ALCOHOL/MO/W.PET/CERES
2 CREAM (GRAM) TOPICAL 2 TIMES DAILY
COMMUNITY

## 2018-10-18 RX ORDER — CHLORHEXIDINE/GLYCERIN/HE-CELL
2 JELLY (GRAM) TOPICAL DAILY
COMMUNITY
End: 2019-10-31 | Stop reason: SDUPTHER

## 2018-10-18 RX ORDER — TOPIRAMATE 25 MG/1
25 TABLET ORAL DAILY
Qty: 90 TABLET | Refills: 3 | Status: SHIPPED | OUTPATIENT
Start: 2018-10-18 | End: 2019-10-31 | Stop reason: SDUPTHER

## 2018-10-18 RX ORDER — HYOSCYAMINE SULFATE EXTENDED-RELEASE 0.38 MG/1
0.38 TABLET ORAL 2 TIMES DAILY
Qty: 180 TABLET | Refills: 4 | Status: SHIPPED | OUTPATIENT
Start: 2018-10-18 | End: 2019-10-31 | Stop reason: SDUPTHER

## 2018-10-18 RX ORDER — BIOTIN 1 MG
TABLET ORAL
COMMUNITY

## 2018-10-18 RX ORDER — HYOSCYAMINE SULFATE EXTENDED-RELEASE 0.38 MG/1
1 TABLET ORAL EVERY 12 HOURS PRN
COMMUNITY
Start: 2013-10-28 | End: 2018-10-18 | Stop reason: SDUPTHER

## 2018-10-18 RX ORDER — LOPERAMIDE HYDROCHLORIDE 2 MG/1
CAPSULE ORAL
COMMUNITY
Start: 2017-11-30 | End: 2018-10-18 | Stop reason: SDUPTHER

## 2018-10-18 RX ORDER — LOPERAMIDE HYDROCHLORIDE 2 MG/1
4 CAPSULE ORAL 2 TIMES DAILY
Qty: 180 CAPSULE | Refills: 0 | Status: SHIPPED | OUTPATIENT
Start: 2018-10-18 | End: 2018-10-29 | Stop reason: SDUPTHER

## 2018-10-18 NOTE — PROGRESS NOTES
Assessment/Plan:    No problem-specific Assessment & Plan notes found for this encounter  Diagnoses and all orders for this visit:    Sarcoidosis  -     XR chest pa & lateral; Standing    Chronic migraine without aura without status migrainosus, not intractable  -     sertraline (ZOLOFT) 50 mg tablet; Take 1 tablet (50 mg total) by mouth daily  -     topiramate (TOPAMAX) 25 mg tablet; Take 1 tablet (25 mg total) by mouth daily    Irritable bowel syndrome with diarrhea  -     hyoscyamine (LEVBID) 0 375 mg 12 hr tablet; Take 1 tablet (0 375 mg total) by mouth 2 (two) times a day  -     loperamide (IMODIUM) 2 mg capsule; Take 2 capsules (4 mg total) by mouth 2 (two) times a day  -     ondansetron (ZOFRAN) 4 mg tablet; Take 1 tablet (4 mg total) by mouth every 8 (eight) hours as needed for nausea or vomiting    Other orders  -     calcium citrate-vitamin D (CITRACAL+D) 315-200 MG-UNIT per tablet; Take by mouth Twice daily  -     Omega-3 Fatty Acids ([employer] FISH OIL) 1000 MG CAPS; Take 2 capsules by mouth daily  -     Multiple Vitamin (DAILY VALUE MULTIVITAMIN PO); Take by mouth  -     hyoscyamine (LEVBID) 0 375 mg 12 hr tablet; Take 1 tablet by mouth every 12 (twelve) hours as needed  -     loperamide (IMODIUM) 2 mg capsule; Take by mouth  -     sertraline (ZOLOFT) 50 mg tablet; Take 1 tablet by mouth daily  -     trandolapril (MAVIK) 2 MG tablet; Take 1 tablet by mouth daily  -     metoprolol succinate (TOPROL XL) 50 mg 24 hr tablet; Take 0 5 tablets by mouth 2 (two) times a day  -     acetaminophen (TYLENOL 8 HOUR ARTHRITIS PAIN) 650 mg CR tablet; Take by mouth Twice daily  -     Cholecalciferol (VITAMIN D3) 1000 units CAPS; Take by mouth  -     carboxymethylcellulose (REFRESH TEARS) 0 5 % SOLN; Apply to eye  -     ondansetron (ZOFRAN) 4 mg tablet; Take 4 mg by mouth every 8 (eight) hours as needed for nausea or vomiting          Subjective:      Patient ID: Macarena Hutchinson is a 58 y o  female  Mrs   Price is here today follow-up visit feels pretty well she is going to be seeing her cardiologist and the near future he is going to do a echo and if all is well he intends to turn her loose on everything else is pretty much status quo blood pressure is very well controlled up-to-date on colonoscopies        The following portions of the patient's history were reviewed and updated as appropriate:   She  has a past medical history of Hypertension and TMJ disease (05/2017)  She There are no active problems to display for this patient  She  has a past surgical history that includes Colporrhaphy; Bladder surgery; Cataract extraction, bilateral; Hysterectomy; Bilateral oophorectomy; and Retinal detachment surgery (Left)  Her family history includes Aneurysm in her mother; Hypertension in her father; Subarachnoid hemorrhage in her father  She  reports that she has never smoked  She has never used smokeless tobacco  She reports that she does not drink alcohol or use drugs    Current Outpatient Prescriptions   Medication Sig Dispense Refill    acetaminophen (TYLENOL 8 HOUR ARTHRITIS PAIN) 650 mg CR tablet Take by mouth Twice daily      calcium citrate-vitamin D (CITRACAL+D) 315-200 MG-UNIT per tablet Take by mouth Twice daily      carboxymethylcellulose (REFRESH TEARS) 0 5 % SOLN Apply to eye      Cholecalciferol (VITAMIN D3) 1000 units CAPS Take by mouth      hyoscyamine (LEVBID) 0 375 mg 12 hr tablet Take 1 tablet by mouth every 12 (twelve) hours as needed      loperamide (IMODIUM) 2 mg capsule Take by mouth      metoprolol succinate (TOPROL XL) 50 mg 24 hr tablet Take 0 5 tablets by mouth 2 (two) times a day      Multiple Vitamin (DAILY VALUE MULTIVITAMIN PO) Take by mouth      Omega-3 Fatty Acids (CVS FISH OIL) 1000 MG CAPS Take 2 capsules by mouth daily      ondansetron (ZOFRAN) 4 mg tablet Take 4 mg by mouth every 8 (eight) hours as needed for nausea or vomiting      sertraline (ZOLOFT) 50 mg tablet Take 1 tablet by mouth daily      topiramate (TOPAMAX) 25 mg tablet Take 1 tablet (25 mg total) by mouth daily 90 tablet 1    trandolapril (MAVIK) 2 MG tablet Take 1 tablet by mouth daily       No current facility-administered medications for this visit  Current Outpatient Prescriptions on File Prior to Visit   Medication Sig    topiramate (TOPAMAX) 25 mg tablet Take 1 tablet (25 mg total) by mouth daily     No current facility-administered medications on file prior to visit  She is allergic to dust mite extract; flu virus vaccine; molds & smuts; aileen grass pollen allergen; and tree extract       Review of Systems   Constitutional: Negative for activity change, appetite change, diaphoresis, fatigue and fever  HENT: Negative  Eyes: Negative  Respiratory: Negative for apnea, cough, chest tightness, shortness of breath and wheezing  Cardiovascular: Negative for chest pain, palpitations and leg swelling  Gastrointestinal: Negative for abdominal distention, abdominal pain, anal bleeding, constipation, diarrhea, nausea and vomiting  Endocrine: Negative for cold intolerance, heat intolerance, polydipsia, polyphagia and polyuria  Genitourinary: Negative for difficulty urinating, dysuria, flank pain, hematuria and urgency  Musculoskeletal: Negative for arthralgias, back pain, gait problem, joint swelling and myalgias  Sarcoidosis   Skin: Negative for color change, rash and wound  Allergic/Immunologic: Negative for environmental allergies, food allergies and immunocompromised state  Neurological: Negative for dizziness, seizures, syncope, speech difficulty, numbness and headaches  Hematological: Negative for adenopathy  Does not bruise/bleed easily  Psychiatric/Behavioral: Negative for agitation, behavioral problems, hallucinations, sleep disturbance and suicidal ideas           Objective:      /64 (BP Location: Left arm, Patient Position: Sitting, Cuff Size: Standard)   Ht 5' 8" (0 498 m)   Wt 65 3 kg (144 lb)   BMI 21 90 kg/m²          Physical Exam   Constitutional: She is oriented to person, place, and time  She appears well-developed and well-nourished  No distress  HENT:   Head: Normocephalic  Right Ear: External ear normal    Left Ear: External ear normal    Nose: Nose normal    Mouth/Throat: Oropharynx is clear and moist    Eyes: Pupils are equal, round, and reactive to light  Conjunctivae and EOM are normal  Right eye exhibits no discharge  Left eye exhibits no discharge  No scleral icterus  Neck: Normal range of motion  No tracheal deviation present  No thyromegaly present  Cardiovascular: Normal rate, regular rhythm and normal heart sounds  Exam reveals no gallop and no friction rub  No murmur heard  Pulmonary/Chest: Effort normal and breath sounds normal  No respiratory distress  She has no wheezes  Abdominal: Soft  Bowel sounds are normal  She exhibits no mass  There is no tenderness  There is no guarding  Musculoskeletal: She exhibits no edema or deformity  Lymphadenopathy:     She has no cervical adenopathy  Neurological: She is alert and oriented to person, place, and time  No cranial nerve deficit  Skin: Skin is warm and dry  No rash noted  She is not diaphoretic  No erythema  Psychiatric: She has a normal mood and affect   Thought content normal

## 2018-10-25 ENCOUNTER — TRANSCRIBE ORDERS (OUTPATIENT)
Dept: ADMINISTRATIVE | Facility: HOSPITAL | Age: 63
End: 2018-10-25

## 2018-10-25 DIAGNOSIS — Z12.31 ENCOUNTER FOR SCREENING MAMMOGRAM FOR MALIGNANT NEOPLASM OF BREAST: Primary | ICD-10-CM

## 2018-10-29 DIAGNOSIS — K58.0 IRRITABLE BOWEL SYNDROME WITH DIARRHEA: Primary | ICD-10-CM

## 2018-10-29 RX ORDER — LOPERAMIDE HYDROCHLORIDE 2 MG/1
TABLET ORAL
Qty: 144 TABLET | Refills: 2 | Status: SHIPPED | OUTPATIENT
Start: 2018-10-29 | End: 2019-10-31 | Stop reason: SDUPTHER

## 2018-12-10 ENCOUNTER — HOSPITAL ENCOUNTER (OUTPATIENT)
Dept: MAMMOGRAPHY | Facility: HOSPITAL | Age: 63
Discharge: HOME | End: 2018-12-10
Payer: COMMERCIAL

## 2018-12-10 VITALS — WEIGHT: 144 LBS | HEIGHT: 68 IN | BODY MASS INDEX: 21.82 KG/M2

## 2018-12-10 DIAGNOSIS — Z12.31 ENCOUNTER FOR SCREENING MAMMOGRAM FOR MALIGNANT NEOPLASM OF BREAST: ICD-10-CM

## 2018-12-10 PROCEDURE — 77063 BREAST TOMOSYNTHESIS BI: CPT

## 2018-12-10 PROCEDURE — 77067 SCR MAMMO BI INCL CAD: CPT

## 2018-12-28 ENCOUNTER — HOSPITAL ENCOUNTER (OUTPATIENT)
Dept: RADIOLOGY | Facility: HOSPITAL | Age: 63
Discharge: HOME | End: 2018-12-28
Payer: COMMERCIAL

## 2018-12-28 ENCOUNTER — TRANSCRIBE ORDERS (OUTPATIENT)
Dept: ADMINISTRATIVE | Facility: HOSPITAL | Age: 63
End: 2018-12-28

## 2018-12-28 DIAGNOSIS — M25.511 RIGHT SHOULDER PAIN, UNSPECIFIED CHRONICITY: Primary | ICD-10-CM

## 2018-12-28 DIAGNOSIS — M25.511 RIGHT SHOULDER PAIN, UNSPECIFIED CHRONICITY: ICD-10-CM

## 2018-12-28 PROCEDURE — 73030 X-RAY EXAM OF SHOULDER: CPT

## 2019-02-02 ENCOUNTER — TRANSCRIBE ORDERS (OUTPATIENT)
Dept: ADMINISTRATIVE | Facility: HOSPITAL | Age: 64
End: 2019-02-02

## 2019-02-02 ENCOUNTER — HOSPITAL ENCOUNTER (OUTPATIENT)
Dept: RADIOLOGY | Facility: HOSPITAL | Age: 64
Discharge: HOME | End: 2019-02-02
Attending: ANESTHESIOLOGY

## 2019-02-02 ENCOUNTER — HOSPITAL ENCOUNTER (OUTPATIENT)
Dept: RADIOLOGY | Facility: HOSPITAL | Age: 64
Discharge: HOME | End: 2019-02-02
Payer: MEDICARE

## 2019-02-02 DIAGNOSIS — M25.551 RIGHT HIP PAIN: ICD-10-CM

## 2019-02-02 DIAGNOSIS — M25.552 LEFT HIP PAIN: ICD-10-CM

## 2019-02-02 DIAGNOSIS — M25.552 LEFT HIP PAIN: Primary | ICD-10-CM

## 2019-02-02 PROCEDURE — 73522 X-RAY EXAM HIPS BI 3-4 VIEWS: CPT

## 2019-04-10 ENCOUNTER — TELEPHONE (OUTPATIENT)
Dept: FAMILY MEDICINE CLINIC | Facility: CLINIC | Age: 64
End: 2019-04-10

## 2019-06-12 ENCOUNTER — TRANSCRIBE ORDERS (OUTPATIENT)
Dept: PHYSICAL THERAPY | Facility: CLINIC | Age: 64
End: 2019-06-12

## 2019-06-12 ENCOUNTER — EVALUATION (OUTPATIENT)
Dept: PHYSICAL THERAPY | Facility: CLINIC | Age: 64
End: 2019-06-12
Payer: MEDICARE

## 2019-06-12 DIAGNOSIS — M75.01 ADHESIVE CAPSULITIS OF RIGHT SHOULDER: Primary | ICD-10-CM

## 2019-06-12 DIAGNOSIS — M25.511 CHRONIC RIGHT SHOULDER PAIN: ICD-10-CM

## 2019-06-12 DIAGNOSIS — G89.29 CHRONIC RIGHT SHOULDER PAIN: ICD-10-CM

## 2019-06-12 PROCEDURE — 97140 MANUAL THERAPY 1/> REGIONS: CPT | Performed by: PHYSICAL THERAPIST

## 2019-06-12 PROCEDURE — 97535 SELF CARE MNGMENT TRAINING: CPT | Performed by: PHYSICAL THERAPIST

## 2019-06-12 PROCEDURE — 97161 PT EVAL LOW COMPLEX 20 MIN: CPT | Performed by: PHYSICAL THERAPIST

## 2019-06-12 PROCEDURE — 97110 THERAPEUTIC EXERCISES: CPT | Performed by: PHYSICAL THERAPIST

## 2019-06-13 ENCOUNTER — OFFICE VISIT (OUTPATIENT)
Dept: PHYSICAL THERAPY | Facility: CLINIC | Age: 64
End: 2019-06-13
Payer: MEDICARE

## 2019-06-13 DIAGNOSIS — M25.511 CHRONIC RIGHT SHOULDER PAIN: ICD-10-CM

## 2019-06-13 DIAGNOSIS — M75.01 ADHESIVE CAPSULITIS OF RIGHT SHOULDER: Primary | ICD-10-CM

## 2019-06-13 DIAGNOSIS — G89.29 CHRONIC RIGHT SHOULDER PAIN: ICD-10-CM

## 2019-06-13 PROCEDURE — 97110 THERAPEUTIC EXERCISES: CPT | Performed by: PHYSICAL THERAPIST

## 2019-06-13 PROCEDURE — 97112 NEUROMUSCULAR REEDUCATION: CPT | Performed by: PHYSICAL THERAPIST

## 2019-06-13 PROCEDURE — 97140 MANUAL THERAPY 1/> REGIONS: CPT | Performed by: PHYSICAL THERAPIST

## 2019-06-17 ENCOUNTER — OFFICE VISIT (OUTPATIENT)
Dept: PHYSICAL THERAPY | Facility: CLINIC | Age: 64
End: 2019-06-17
Payer: MEDICARE

## 2019-06-17 DIAGNOSIS — M25.511 CHRONIC RIGHT SHOULDER PAIN: ICD-10-CM

## 2019-06-17 DIAGNOSIS — M75.01 ADHESIVE CAPSULITIS OF RIGHT SHOULDER: Primary | ICD-10-CM

## 2019-06-17 DIAGNOSIS — G89.29 CHRONIC RIGHT SHOULDER PAIN: ICD-10-CM

## 2019-06-17 PROCEDURE — 97140 MANUAL THERAPY 1/> REGIONS: CPT | Performed by: PHYSICAL THERAPIST

## 2019-06-17 PROCEDURE — 97112 NEUROMUSCULAR REEDUCATION: CPT | Performed by: PHYSICAL THERAPIST

## 2019-06-17 PROCEDURE — 97110 THERAPEUTIC EXERCISES: CPT | Performed by: PHYSICAL THERAPIST

## 2019-06-19 ENCOUNTER — OFFICE VISIT (OUTPATIENT)
Dept: PHYSICAL THERAPY | Facility: CLINIC | Age: 64
End: 2019-06-19
Payer: MEDICARE

## 2019-06-19 DIAGNOSIS — M25.511 CHRONIC RIGHT SHOULDER PAIN: ICD-10-CM

## 2019-06-19 DIAGNOSIS — M75.01 ADHESIVE CAPSULITIS OF RIGHT SHOULDER: Primary | ICD-10-CM

## 2019-06-19 DIAGNOSIS — G89.29 CHRONIC RIGHT SHOULDER PAIN: ICD-10-CM

## 2019-06-19 PROCEDURE — 97110 THERAPEUTIC EXERCISES: CPT | Performed by: PHYSICAL THERAPIST

## 2019-06-19 PROCEDURE — 97140 MANUAL THERAPY 1/> REGIONS: CPT | Performed by: PHYSICAL THERAPIST

## 2019-06-19 PROCEDURE — 97112 NEUROMUSCULAR REEDUCATION: CPT | Performed by: PHYSICAL THERAPIST

## 2019-06-20 ENCOUNTER — OFFICE VISIT (OUTPATIENT)
Dept: PHYSICAL THERAPY | Facility: CLINIC | Age: 64
End: 2019-06-20
Payer: MEDICARE

## 2019-06-20 DIAGNOSIS — M25.511 CHRONIC RIGHT SHOULDER PAIN: ICD-10-CM

## 2019-06-20 DIAGNOSIS — M75.01 ADHESIVE CAPSULITIS OF RIGHT SHOULDER: Primary | ICD-10-CM

## 2019-06-20 DIAGNOSIS — G89.29 CHRONIC RIGHT SHOULDER PAIN: ICD-10-CM

## 2019-06-20 PROCEDURE — 97140 MANUAL THERAPY 1/> REGIONS: CPT | Performed by: PHYSICAL THERAPIST

## 2019-06-20 PROCEDURE — 97112 NEUROMUSCULAR REEDUCATION: CPT | Performed by: PHYSICAL THERAPIST

## 2019-06-20 PROCEDURE — 97110 THERAPEUTIC EXERCISES: CPT | Performed by: PHYSICAL THERAPIST

## 2019-06-24 ENCOUNTER — APPOINTMENT (OUTPATIENT)
Dept: PHYSICAL THERAPY | Facility: CLINIC | Age: 64
End: 2019-06-24
Payer: MEDICARE

## 2019-06-26 ENCOUNTER — OFFICE VISIT (OUTPATIENT)
Dept: PHYSICAL THERAPY | Facility: CLINIC | Age: 64
End: 2019-06-26
Payer: MEDICARE

## 2019-06-26 DIAGNOSIS — M25.511 CHRONIC RIGHT SHOULDER PAIN: ICD-10-CM

## 2019-06-26 DIAGNOSIS — M75.01 ADHESIVE CAPSULITIS OF RIGHT SHOULDER: Primary | ICD-10-CM

## 2019-06-26 DIAGNOSIS — G89.29 CHRONIC RIGHT SHOULDER PAIN: ICD-10-CM

## 2019-06-26 PROCEDURE — 97140 MANUAL THERAPY 1/> REGIONS: CPT | Performed by: PHYSICAL THERAPIST

## 2019-06-26 PROCEDURE — 97112 NEUROMUSCULAR REEDUCATION: CPT | Performed by: PHYSICAL THERAPIST

## 2019-06-26 PROCEDURE — 97110 THERAPEUTIC EXERCISES: CPT | Performed by: PHYSICAL THERAPIST

## 2019-06-27 ENCOUNTER — OFFICE VISIT (OUTPATIENT)
Dept: PHYSICAL THERAPY | Facility: CLINIC | Age: 64
End: 2019-06-27
Payer: MEDICARE

## 2019-06-27 DIAGNOSIS — G89.29 CHRONIC RIGHT SHOULDER PAIN: ICD-10-CM

## 2019-06-27 DIAGNOSIS — M25.511 CHRONIC RIGHT SHOULDER PAIN: ICD-10-CM

## 2019-06-27 DIAGNOSIS — M75.01 ADHESIVE CAPSULITIS OF RIGHT SHOULDER: Primary | ICD-10-CM

## 2019-06-27 PROCEDURE — 97110 THERAPEUTIC EXERCISES: CPT | Performed by: PHYSICAL THERAPIST

## 2019-06-27 PROCEDURE — 97140 MANUAL THERAPY 1/> REGIONS: CPT | Performed by: PHYSICAL THERAPIST

## 2019-06-27 PROCEDURE — 97112 NEUROMUSCULAR REEDUCATION: CPT | Performed by: PHYSICAL THERAPIST

## 2019-07-01 ENCOUNTER — OFFICE VISIT (OUTPATIENT)
Dept: PHYSICAL THERAPY | Facility: CLINIC | Age: 64
End: 2019-07-01
Payer: MEDICARE

## 2019-07-01 DIAGNOSIS — G89.29 CHRONIC RIGHT SHOULDER PAIN: ICD-10-CM

## 2019-07-01 DIAGNOSIS — M75.01 ADHESIVE CAPSULITIS OF RIGHT SHOULDER: Primary | ICD-10-CM

## 2019-07-01 DIAGNOSIS — M25.511 CHRONIC RIGHT SHOULDER PAIN: ICD-10-CM

## 2019-07-01 PROCEDURE — 97112 NEUROMUSCULAR REEDUCATION: CPT | Performed by: PHYSICAL THERAPIST

## 2019-07-01 PROCEDURE — 97530 THERAPEUTIC ACTIVITIES: CPT | Performed by: PHYSICAL THERAPIST

## 2019-07-01 PROCEDURE — 97110 THERAPEUTIC EXERCISES: CPT | Performed by: PHYSICAL THERAPIST

## 2019-07-01 PROCEDURE — 97140 MANUAL THERAPY 1/> REGIONS: CPT | Performed by: PHYSICAL THERAPIST

## 2019-07-01 NOTE — PROGRESS NOTES
Daily Note     Today's date: 2019  Patient name: Letty Quiroga  : 1955  MRN: [id number]  Referring provider: Cordell Robb DO  Dx:   Encounter Diagnosis     ICD-10-CM    1  Adhesive capsulitis of right shoulder M75 01    2  Chronic right shoulder pain M25 511     G89 29                   Subjective: Pt reports continued progress with each session  No setbacks  Anxious to continue making progress  Objective: See treatment diary below      Assessment: Tolerated treatment well  Patient demonstrated fatigue post treatment, exhibited good technique with therapeutic exercises and would benefit from continued PT      Plan: Continue per plan of care  Progress treatment as tolerated        Precautions: R Shoulder Adhesive Capsulitis    Daily Treatment Diary    HEP: Sheet provided and discussed    Manual  19            ART x20'            IASTM             JM             PROM and UE stretch             STM/Triggerpoint               Exercise Diary  19            IR Green strap stretch 2x10 10''            Pullies 3x10            Supine Wand ER/Flx Flex 3x10            Standing wand flexion with wall assist             Supine No Money 3x10            Supine No Money into Gala Sharon 3x10            Front Wall Slides             Lat Wall Slides             T-Band: Row             TB LTP             No Moneys 3x10            No Money into Wall Jose 2x10            Prone Scaption             Prone Abd, Scap             Prone scap retract with ER             SL Er/ ABD             T-Band ER             AROM Scaption             UBE: Retro             Scap Retraction 3x10            Scap Depression 3x10            TB ER             TB 45 degree Sh  Abd                                                    Body Blade                 Modalities  19            MHP x10'            CP             Stim

## 2019-07-03 ENCOUNTER — OFFICE VISIT (OUTPATIENT)
Dept: PHYSICAL THERAPY | Facility: CLINIC | Age: 64
End: 2019-07-03
Payer: MEDICARE

## 2019-07-03 DIAGNOSIS — M75.01 ADHESIVE CAPSULITIS OF RIGHT SHOULDER: Primary | ICD-10-CM

## 2019-07-03 DIAGNOSIS — G89.29 CHRONIC RIGHT SHOULDER PAIN: ICD-10-CM

## 2019-07-03 DIAGNOSIS — M25.511 CHRONIC RIGHT SHOULDER PAIN: ICD-10-CM

## 2019-07-03 PROCEDURE — 97112 NEUROMUSCULAR REEDUCATION: CPT | Performed by: PHYSICAL THERAPIST

## 2019-07-03 PROCEDURE — 97110 THERAPEUTIC EXERCISES: CPT | Performed by: PHYSICAL THERAPIST

## 2019-07-03 PROCEDURE — 97140 MANUAL THERAPY 1/> REGIONS: CPT | Performed by: PHYSICAL THERAPIST

## 2019-07-03 NOTE — LETTER
July 3, 2019    Earl Shay DO  [address]    Patient: Brynn Reilly   YOB: 1955   Date of Visit: 7/3/2019     Encounter Diagnosis     ICD-10-CM    1  Adhesive capsulitis of right shoulder M75 01    2  Chronic right shoulder pain M25 511     G89 29        Dear Dr Thiago Minor:    Please review the attached Plan of Care from [facility] 9 recent visit  Please verify that you agree therapy should continue by signing the attached document and sending it back to our office  If you have any questions or concerns, please don't hesitate to call  Sincerely,    Kash Angeles, PT, DPT, ATC, ART      Referring Provider:      I certify that I have read the below Plan of Care and certify the need for these services furnished under this plan of treatment while under my care  Earl Shay DO  [address]  [address] [facility] [address] [place] [address] 80: [phone]          PT Re-Evaluation     Today's date: 7/3/2019  Patient name: Brynn Reilly  : 1955  MRN: [id number]  Referring provider: Morris Germain DO  Dx:   Encounter Diagnosis     ICD-10-CM    1  Adhesive capsulitis of right shoulder M75 01    2   Chronic right shoulder pain M25 511     G89 29                   Assessment  Understanding of Dx/Px/POC: good   Prognosis: good    Goals  STGs: To be complete within 4 weeks  - Decrease pain to < 2/10 at worst (partially met)  - Increase AROM to WNL (partially met)  - Increase strength to > 4+/5 (partially met)  - Improve postural awareness capacity to > 60min before deficit (partially met)    LTGs: To be complete within 6 weeks  - Able to repetitively complete all overhead activity without pain or limitation for increased safety and functional capacity with ADLs and home-related duty (partially met)  - Able to repetitively complete all reaching activity without pain or limitation for increased safety and functional capacity with ADLs and home-related duty (partially met)  - Able to repetitively complete all pushing/pulling activity without pain or limitation for increased safety and functional capacity with ADLs and home-related duty (partially met)  - Able to complete all lifting/carrying activity without pain or limitation for increased safety and functional capacity with ADLs and home-related duty (partially met)    Plan  Frequency: 3x week  Duration in weeks: 4       Upon completion of today's re-evaluation, as evidenced by present objective and subjective measures, Zulema's sx remain consistent with continued, well-paced progress from R Shoulder pain and dysfunction secondary to adhesive capsulitis  Patient will benefit from continued skilled physical therapy to address current deficits  Subjective Evaluation    Pain  Current pain rating: 3  At best pain ratin  At worst pain ratin  Location: R Shoulder         Pt reports her pain and function and ROM have improved  Reports she sill feels she a lot more to gain, but can tell she is much better since IE  Anxious to continue making progress          Objective (+) Hawkin's, (+) Empty Can  Pain level ranges 1-5/10  AROM: Flex 160 degrees, Abd 115 degrees, ER 75 degrees, IR 35 degrees  Strength: R Shoulder ER, Abd 3+/5, Flex 4/5, IR 4+/5  Postural Awareness: Fa (rounded shoulders, forward head)  PSFS: 6/10  Unable to complete overhead activity without pain and limitation, but improving  Unable to complete pushing/pulling activity without pain and limitation, but improving  Unable to complete lifting/carrying activity without pain and limitation, but improving  Unable to complete cross body/behind the back reaching activity without pain and limitation, but improving               Precautions: R Shoulder Adhesive Capsulitis    Daily Treatment Diary    HEP: Sheet provided and discussed    Manual  7/3/19            ART x20'            IASTM             JORDEN             PROM and UE stretch STM/Triggerpoint               Exercise Diary  7/3/19            IR Green strap stretch 2x10 10''            Pullies 3x10            Supine Wand ER/Flx Flex 3x10            Standing wand flexion with wall assist 3x10            Supine No Money 3x10            Supine No Money into Shahab [employer] 3x10            Front Wall Slides             Lat Wall Slides             T-Band: Row             TB LTP             No Moneys 3x10            No Money into Wall Jose 2x10            Prone Scaption             Prone Abd, Scap             Prone scap retract with ER             SL Er/ ABD             T-Band ER             AROM Scaption             UBE: Retro             Scap Retraction 3x10            Scap Depression 3x10            TB ER             TB 45 degree Sh  Abd                                                    Body Blade                 Modalities  7/3/19            MHP x10'            CP             Stim

## 2019-07-03 NOTE — PROGRESS NOTES
PT Re-Evaluation     Today's date: 7/3/2019  Patient name: Santana Crook  : 1955  MRN: [id number]  Referring provider: Adeel Ohara DO  Dx:   Encounter Diagnosis     ICD-10-CM    1  Adhesive capsulitis of right shoulder M75 01    2  Chronic right shoulder pain M25 511     G89 29                   Assessment  Understanding of Dx/Px/POC: good   Prognosis: good    Goals  STGs: To be complete within 4 weeks  - Decrease pain to < 2/10 at worst (partially met)  - Increase AROM to WNL (partially met)  - Increase strength to > 4+/5 (partially met)  - Improve postural awareness capacity to > 60min before deficit (partially met)    LTGs: To be complete within 6 weeks  - Able to repetitively complete all overhead activity without pain or limitation for increased safety and functional capacity with ADLs and home-related duty (partially met)  - Able to repetitively complete all reaching activity without pain or limitation for increased safety and functional capacity with ADLs and home-related duty (partially met)  - Able to repetitively complete all pushing/pulling activity without pain or limitation for increased safety and functional capacity with ADLs and home-related duty (partially met)  - Able to complete all lifting/carrying activity without pain or limitation for increased safety and functional capacity with ADLs and home-related duty (partially met)    Plan  Frequency: 3x week  Duration in weeks: 4       Upon completion of today's re-evaluation, as evidenced by present objective and subjective measures, Zulema's sx remain consistent with continued, well-paced progress from R Shoulder pain and dysfunction secondary to adhesive capsulitis  Patient will benefit from continued skilled physical therapy to address current deficits        Subjective Evaluation    Pain  Current pain rating: 3  At best pain ratin  At worst pain ratin  Location: R Shoulder         Pt reports her pain and function and ROM have improved  Reports she sill feels she a lot more to gain, but can tell she is much better since IE  Anxious to continue making progress          Objective (+) Hawkin's, (+) Empty Can  Pain level ranges 1-5/10  AROM: Flex 160 degrees, Abd 115 degrees, ER 75 degrees, IR 35 degrees  Strength: R Shoulder ER, Abd 3+/5, Flex 4/5, IR 4+/5  Postural Awareness: Fa (rounded shoulders, forward head)  PSFS: 6/10  Unable to complete overhead activity without pain and limitation, but improving  Unable to complete pushing/pulling activity without pain and limitation, but improving  Unable to complete lifting/carrying activity without pain and limitation, but improving  Unable to complete cross body/behind the back reaching activity without pain and limitation, but improving               Precautions: R Shoulder Adhesive Capsulitis    Daily Treatment Diary    HEP: Sheet provided and discussed    Manual  7/3/19            ART x20'            IASTM             JM             PROM and UE stretch             STM/Triggerpoint               Exercise Diary  7/3/19            IR Green strap stretch 2x10 10''            Pullies 3x10            Supine Wand ER/Flx Flex 3x10            Standing wand flexion with wall assist 3x10            Supine No Money 3x10            Supine No Money into Alvy Innocent 3x10            Front Wall Slides             Lat Wall Slides             T-Band: Row             TB LTP             No Moneys 3x10            No Money into Wall Jose 2x10            Prone Scaption             Prone Abd, Scap             Prone scap retract with ER             SL Er/ ABD             T-Band ER             AROM Scaption             UBE: Retro             Scap Retraction 3x10            Scap Depression 3x10            TB ER             TB 45 degree Sh  Abd                                                    Body Blade                 Modalities  7/3/19            MHP x10'            CP             Stim

## 2019-07-03 NOTE — LETTER
July 3, 2019    Shayna Collins DO  [address]  [address]    Patient: Lucie Yung   YOB: 1955   Date of Visit: 7/3/2019     Encounter Diagnosis     ICD-10-CM    1  Adhesive capsulitis of right shoulder M75 01    2  Chronic right shoulder pain M25 511     G89 29        Dear Dr Key Barrera:    Please review the attached Plan of Care from [facility] 9 recent visit  Please verify that you agree therapy should continue by signing the attached document and sending it back to our office  If you have any questions or concerns, please don't hesitate to call  Sincerely,    Td Vaughn, PT, DPT, ATC, ART      Referring Provider:      I certify that I have read the below Plan of Care and certify the need for these services furnished under this plan of treatment while under my care  Shayna Collins DO  [address]  [address] [facility] [address] [place] [address] 80: [phone]          PT Re-Evaluation     Today's date: 7/3/2019  Patient name: Lucie Yung  : 1955  MRN: [id number]  Referring provider: Palak Chavez DO  Dx:   Encounter Diagnosis     ICD-10-CM    1  Adhesive capsulitis of right shoulder M75 01    2   Chronic right shoulder pain M25 511     G89 29                   Assessment  Understanding of Dx/Px/POC: good   Prognosis: good    Goals  STGs: To be complete within 4 weeks  - Decrease pain to < 2/10 at worst (partially met)  - Increase AROM to WNL (partially met)  - Increase strength to > 4+/5 (partially met)  - Improve postural awareness capacity to > 60min before deficit (partially met)    LTGs: To be complete within 6 weeks  - Able to repetitively complete all overhead activity without pain or limitation for increased safety and functional capacity with ADLs and home-related duty (partially met)  - Able to repetitively complete all reaching activity without pain or limitation for increased safety and functional capacity with ADLs and home-related duty (partially met)  - Able to repetitively complete all pushing/pulling activity without pain or limitation for increased safety and functional capacity with ADLs and home-related duty (partially met)  - Able to complete all lifting/carrying activity without pain or limitation for increased safety and functional capacity with ADLs and home-related duty (partially met)    Plan  Frequency: 3x week  Duration in weeks: 4       Upon completion of today's re-evaluation, as evidenced by present objective and subjective measures, Zulema's sx remain consistent with continued, well-paced progress from R Shoulder pain and dysfunction secondary to adhesive capsulitis  Patient will benefit from continued skilled physical therapy to address current deficits  Subjective Evaluation    Pain  Current pain rating: 3  At best pain ratin  At worst pain ratin  Location: R Shoulder         Pt reports her pain and function and ROM have improved  Reports she sill feels she a lot more to gain, but can tell she is much better since IE  Anxious to continue making progress          Objective (+) Hawkin's, (+) Empty Can  Pain level ranges 1-5/10  AROM: Flex 160 degrees, Abd 115 degrees, ER 75 degrees, IR 35 degrees  Strength: R Shoulder ER, Abd 3+/5, Flex 4/5, IR 4+/5  Postural Awareness: Fa (rounded shoulders, forward head)  PSFS: 6/10  Unable to complete overhead activity without pain and limitation, but improving  Unable to complete pushing/pulling activity without pain and limitation, but improving  Unable to complete lifting/carrying activity without pain and limitation, but improving  Unable to complete cross body/behind the back reaching activity without pain and limitation, but improving               Precautions: R Shoulder Adhesive Capsulitis    Daily Treatment Diary    HEP: Sheet provided and discussed    Manual  7/3/19            ART x20'            IASTM             JORDEN             PROM and UE stretch STM/Triggerpoint               Exercise Diary  7/3/19            IR Green strap stretch 2x10 10''            Pullies 3x10            Supine Wand ER/Flx Flex 3x10            Standing wand flexion with wall assist 3x10            Supine No Money 3x10            Supine No Money into [employer] 3x10            Front Wall Slides             Lat Wall Slides             T-Band: Row             TB LTP             No Moneys 3x10            No Money into Wall Jose 2x10            Prone Scaption             Prone Abd, Scap             Prone scap retract with ER             SL Er/ ABD             T-Band ER             AROM Scaption             UBE: Retro             Scap Retraction 3x10            Scap Depression 3x10            TB ER             TB 45 degree Sh  Abd                                                    Body Blade                 Modalities  7/3/19            MHP x10'            CP             Stim

## 2019-07-08 ENCOUNTER — OFFICE VISIT (OUTPATIENT)
Dept: PHYSICAL THERAPY | Facility: CLINIC | Age: 64
End: 2019-07-08
Payer: MEDICARE

## 2019-07-08 DIAGNOSIS — M75.01 ADHESIVE CAPSULITIS OF RIGHT SHOULDER: Primary | ICD-10-CM

## 2019-07-08 DIAGNOSIS — M25.511 CHRONIC RIGHT SHOULDER PAIN: ICD-10-CM

## 2019-07-08 DIAGNOSIS — G89.29 CHRONIC RIGHT SHOULDER PAIN: ICD-10-CM

## 2019-07-08 PROCEDURE — 97112 NEUROMUSCULAR REEDUCATION: CPT | Performed by: PHYSICAL THERAPIST

## 2019-07-08 PROCEDURE — 97140 MANUAL THERAPY 1/> REGIONS: CPT | Performed by: PHYSICAL THERAPIST

## 2019-07-08 PROCEDURE — 97110 THERAPEUTIC EXERCISES: CPT | Performed by: PHYSICAL THERAPIST

## 2019-07-08 NOTE — PROGRESS NOTES
Daily Note     Today's date: 2019  Patient name: Tara Arellano  : 1955  MRN: [id number]  Referring provider: Mary Iqbal DO  Dx:   Encounter Diagnosis     ICD-10-CM    1  Adhesive capsulitis of right shoulder M75 01    2  Chronic right shoulder pain M25 511     G89 29                   Subjective: Pt reports continued progress with each session  No setbacks  Anxious to continue making progress  Objective: See treatment diary below      Assessment: Tolerated treatment well  Patient demonstrated fatigue post treatment, exhibited good technique with therapeutic exercises and would benefit from continued PT      Plan: Continue per plan of care  Progress treatment as tolerated        Precautions: R Shoulder Adhesive Capsulitis    Daily Treatment Diary    HEP: Sheet provided and discussed    Manual  19            ART x20'            IASTM             JM             PROM and UE stretch             STM/Triggerpoint               Exercise Diary  19            IR Green strap stretch 2x10 10''            Pullies 3x10            Supine Wand ER/Flx Flex 3x10            Standing wand flexion with wall assist             Supine No Money 3x10            Supine No Money into [employer] 3x10            Front Wall Slides             Lat Wall Slides             T-Band: Row             TB LTP             No Moneys 3x10            No Money into Wall Jose 2x10            Seated PROM ER with table             Prone Scaption             Prone Abd, Scap             Prone scap retract with ER             SL Er/ ABD             T-Band ER             AROM Scaption             UBE: Retro             Scap Retraction 3x10            Scap Depression 3x10            TB ER             TB 45 degree Sh  Abd                                                    Body Blade                 Modalities  19            MHP x10'            CP             Stim

## 2019-07-10 ENCOUNTER — OFFICE VISIT (OUTPATIENT)
Dept: PHYSICAL THERAPY | Facility: CLINIC | Age: 64
End: 2019-07-10
Payer: MEDICARE

## 2019-07-10 DIAGNOSIS — G89.29 CHRONIC RIGHT SHOULDER PAIN: ICD-10-CM

## 2019-07-10 DIAGNOSIS — M25.511 CHRONIC RIGHT SHOULDER PAIN: ICD-10-CM

## 2019-07-10 DIAGNOSIS — M75.01 ADHESIVE CAPSULITIS OF RIGHT SHOULDER: Primary | ICD-10-CM

## 2019-07-10 PROCEDURE — 97140 MANUAL THERAPY 1/> REGIONS: CPT | Performed by: PHYSICAL THERAPIST

## 2019-07-10 PROCEDURE — 97110 THERAPEUTIC EXERCISES: CPT | Performed by: PHYSICAL THERAPIST

## 2019-07-10 PROCEDURE — 97112 NEUROMUSCULAR REEDUCATION: CPT | Performed by: PHYSICAL THERAPIST

## 2019-07-10 NOTE — PROGRESS NOTES
Daily Note     Today's date: 7/10/2019  Patient name: Melba Velasquez  : 1955  MRN: [id number]  Referring provider: Domenic Osler, DO  Dx:   Encounter Diagnosis     ICD-10-CM    1  Adhesive capsulitis of right shoulder M75 01    2  Chronic right shoulder pain M25 511     G89 29                   Subjective: Pt reports continued progress with each session  No setbacks  Anxious to continue making progress  Objective: See treatment diary below      Assessment: Tolerated treatment well  Patient demonstrated fatigue post treatment, exhibited good technique with therapeutic exercises and would benefit from continued PT      Plan: Continue per plan of care  Progress treatment as tolerated        Precautions: R Shoulder Adhesive Capsulitis    Daily Treatment Diary    HEP: Sheet provided and discussed    Manual  7/10/19            ART x20'            IASTM             JM             PROM and UE stretch             STM/Triggerpoint               Exercise Diary  7/10/19            IR Green strap stretch 2x10 10''            Pullies 3x10            Supine Wand ER/Flx Flex 3x10            Standing wand flexion with wall assist             Supine No Money 3x10            Supine No Money into [employer] 3x10            Front Wall Slides             Lat Wall Slides             T-Band: Row             TB LTP             No Moneys 3x10            No Money into Wall Jose 2x10            Seated PROM ER with table             Prone Scaption             Prone Abd, Scap             Prone scap retract with ER             SL Er/ ABD             T-Band ER             AROM Scaption             UBE: Retro             Scap Retraction 3x10            Scap Depression 3x10            TB ER             TB 45 degree Sh  Abd                                                    Body Blade                 Modalities  7/10/19            MHP x10'            CP             Stim

## 2019-07-11 ENCOUNTER — OFFICE VISIT (OUTPATIENT)
Dept: PHYSICAL THERAPY | Facility: CLINIC | Age: 64
End: 2019-07-11
Payer: MEDICARE

## 2019-07-11 DIAGNOSIS — G89.29 CHRONIC RIGHT SHOULDER PAIN: ICD-10-CM

## 2019-07-11 DIAGNOSIS — M75.01 ADHESIVE CAPSULITIS OF RIGHT SHOULDER: Primary | ICD-10-CM

## 2019-07-11 DIAGNOSIS — M25.511 CHRONIC RIGHT SHOULDER PAIN: ICD-10-CM

## 2019-07-11 PROCEDURE — 97140 MANUAL THERAPY 1/> REGIONS: CPT | Performed by: PHYSICAL THERAPIST

## 2019-07-11 PROCEDURE — 97110 THERAPEUTIC EXERCISES: CPT | Performed by: PHYSICAL THERAPIST

## 2019-07-11 PROCEDURE — 97112 NEUROMUSCULAR REEDUCATION: CPT | Performed by: PHYSICAL THERAPIST

## 2019-07-11 NOTE — PROGRESS NOTES
Daily Note     Today's date: 2019  Patient name: Santana Crook  : 1955  MRN: [id number]  Referring provider: Adeel Ohara DO  Dx:   Encounter Diagnosis     ICD-10-CM    1  Adhesive capsulitis of right shoulder M75 01    2  Chronic right shoulder pain M25 511     G89 29                   Subjective: Pt reports continued progress with each session  No setbacks  Anxious to continue making progress  Objective: See treatment diary below      Assessment: Tolerated treatment well  Patient demonstrated fatigue post treatment, exhibited good technique with therapeutic exercises and would benefit from continued PT      Plan: Continue per plan of care  Progress treatment as tolerated        Precautions: R Shoulder Adhesive Capsulitis    Daily Treatment Diary    HEP: Sheet provided and discussed    Manual  19            ART x20'            IASTM             JM             PROM and UE stretch             STM/Triggerpoint               Exercise Diary  19            IR Green strap stretch 2x10 10''            Pullies 3x10            Supine Wand ER/Flx Flex 3x10            Standing wand flexion with wall assist             Supine No Money 3x10            Supine No Money into Geoffery Ehrich 3x10            Front Wall Slides             Lat Wall Slides             T-Band: Row             TB LTP             No Moneys 3x10            No Money into Wall Jose 2x10            Seated PROM ER with table             Prone Scaption             Prone Abd, Scap             Prone scap retract with ER             SL Er/ ABD             T-Band ER             AROM Scaption             UBE: Retro             Scap Retraction 3x10            Scap Depression 3x10            TB ER             TB 45 degree Sh  Abd                                                    Body Blade                 Modalities  19            MHP x10'            CP             Stim

## 2019-07-15 ENCOUNTER — OFFICE VISIT (OUTPATIENT)
Dept: PHYSICAL THERAPY | Facility: CLINIC | Age: 64
End: 2019-07-15
Payer: MEDICARE

## 2019-07-15 DIAGNOSIS — M25.511 CHRONIC RIGHT SHOULDER PAIN: ICD-10-CM

## 2019-07-15 DIAGNOSIS — G89.29 CHRONIC RIGHT SHOULDER PAIN: ICD-10-CM

## 2019-07-15 DIAGNOSIS — M75.01 ADHESIVE CAPSULITIS OF RIGHT SHOULDER: Primary | ICD-10-CM

## 2019-07-15 PROCEDURE — 97140 MANUAL THERAPY 1/> REGIONS: CPT | Performed by: PHYSICAL THERAPIST

## 2019-07-15 PROCEDURE — 97110 THERAPEUTIC EXERCISES: CPT | Performed by: PHYSICAL THERAPIST

## 2019-07-15 PROCEDURE — 97112 NEUROMUSCULAR REEDUCATION: CPT | Performed by: PHYSICAL THERAPIST

## 2019-07-15 NOTE — PROGRESS NOTES
Daily Note     Today's date: 7/15/2019  Patient name: Vikas Velasquez  : 1955  MRN: [id number]  Referring provider: Derek Peterson DO  Dx:   Encounter Diagnosis     ICD-10-CM    1  Adhesive capsulitis of right shoulder M75 01    2  Chronic right shoulder pain M25 511     G89 29                   Subjective: Pt reports still having deficits, but can tell she has made a large amount of improvement since starting  Anxious to continue making progress  Objective: See treatment diary below      Assessment: Tolerated treatment well  Patient demonstrated fatigue post treatment, exhibited good technique with therapeutic exercises and would benefit from continued PT      Plan: Continue per plan of care  Progress treatment as tolerated        Precautions: R Shoulder Adhesive Capsulitis    Daily Treatment Diary    HEP: Sheet provided and discussed    Manual  7/15/19            ART x20'            IASTM             JM             PROM and UE stretch             STM/Triggerpoint               Exercise Diary  7/15/19            IR Green strap stretch 2x10 10''            Pullies 3x10            Supine Wand ER/Flx Flex 3x10            Standing wand flexion with wall assist             Supine No Money 3x10            Supine No Money into [employer] 3x10            Front Wall Slides             Lat Wall Slides             T-Band: Row             TB LTP             No Moneys 3x10            No Money into Wall Jose 2x10            Seated PROM ER with table             Prone Scaption             Prone Abd, Scap             Prone scap retract with ER             SL Er/ ABD             T-Band ER             AROM Scaption             UBE: Retro             Scap Retraction 3x10            Scap Depression 3x10            TB ER             TB 45 degree Sh  Abd                                                    Body Blade                 Modalities  7/15/19            MHP x10'            CP             Stim

## 2019-07-17 ENCOUNTER — OFFICE VISIT (OUTPATIENT)
Dept: PHYSICAL THERAPY | Facility: CLINIC | Age: 64
End: 2019-07-17
Payer: MEDICARE

## 2019-07-17 DIAGNOSIS — M25.511 CHRONIC RIGHT SHOULDER PAIN: ICD-10-CM

## 2019-07-17 DIAGNOSIS — G89.29 CHRONIC RIGHT SHOULDER PAIN: ICD-10-CM

## 2019-07-17 DIAGNOSIS — M75.01 ADHESIVE CAPSULITIS OF RIGHT SHOULDER: Primary | ICD-10-CM

## 2019-07-17 PROCEDURE — 97112 NEUROMUSCULAR REEDUCATION: CPT | Performed by: PHYSICAL THERAPIST

## 2019-07-17 PROCEDURE — 97140 MANUAL THERAPY 1/> REGIONS: CPT | Performed by: PHYSICAL THERAPIST

## 2019-07-17 PROCEDURE — 97110 THERAPEUTIC EXERCISES: CPT | Performed by: PHYSICAL THERAPIST

## 2019-07-17 NOTE — PROGRESS NOTES
Daily Note     Today's date: 2019  Patient name: Brynn Reilly  : 1955  MRN: [id number]  Referring provider: Morris Germain DO  Dx:   Encounter Diagnosis     ICD-10-CM    1  Adhesive capsulitis of right shoulder M75 01    2  Chronic right shoulder pain M25 511     G89 29                   Subjective: Pt reports continued progress, without setback  HEP going well  Anxious to continue making progress  Objective: See treatment diary below      Assessment: Tolerated treatment well  Patient demonstrated fatigue post treatment, exhibited good technique with therapeutic exercises and would benefit from continued PT      Plan: Continue per plan of care  Progress treatment as tolerated        Precautions: R Shoulder Adhesive Capsulitis    Daily Treatment Diary    HEP: Sheet provided and discussed    Manual  19            ART x20'            IASTM             JM             PROM and UE stretch             STM/Triggerpoint               Exercise Diary  19            IR Green strap stretch 2x10 10''            Pullies 3x10            Supine Wand ER/Flx Flex 3x10            Retro UBE x5'            Supine No Money 3x10            Supine No Money into [employer] 3x10            Front Wall Slides             Lat Wall Slides             T-Band: Row             TB LTP             No Moneys 3x10            No Money into Wall Jose 2x10            Seated PROM ER with table             Prone Scaption             Prone Abd, Scap 3x10 ea            Prone scap retract with ER             SL Er/ ABD             T-Band ER             AROM Scaption             UBE: Retro             Scap Retraction 3x10            Scap Depression 3x10            TB ER             TB 45 degree Sh  Abd                                                    Body Blade                 Modalities  19            MHP x10'            CP             Stim

## 2019-07-18 ENCOUNTER — OFFICE VISIT (OUTPATIENT)
Dept: PHYSICAL THERAPY | Facility: CLINIC | Age: 64
End: 2019-07-18
Payer: MEDICARE

## 2019-07-18 DIAGNOSIS — G89.29 CHRONIC RIGHT SHOULDER PAIN: ICD-10-CM

## 2019-07-18 DIAGNOSIS — M75.01 ADHESIVE CAPSULITIS OF RIGHT SHOULDER: Primary | ICD-10-CM

## 2019-07-18 DIAGNOSIS — M25.511 CHRONIC RIGHT SHOULDER PAIN: ICD-10-CM

## 2019-07-18 PROCEDURE — 97530 THERAPEUTIC ACTIVITIES: CPT | Performed by: PHYSICAL THERAPIST

## 2019-07-18 PROCEDURE — 97110 THERAPEUTIC EXERCISES: CPT | Performed by: PHYSICAL THERAPIST

## 2019-07-18 PROCEDURE — 97140 MANUAL THERAPY 1/> REGIONS: CPT | Performed by: PHYSICAL THERAPIST

## 2019-07-18 PROCEDURE — 97112 NEUROMUSCULAR REEDUCATION: CPT | Performed by: PHYSICAL THERAPIST

## 2019-07-18 NOTE — PROGRESS NOTES
Daily Note     Today's date: 2019  Patient name: Jeanne Mohan  : 1955  MRN: [id number]  Referring provider: Mini Rider DO  Dx:   Encounter Diagnosis     ICD-10-CM    1  Adhesive capsulitis of right shoulder M75 01    2  Chronic right shoulder pain M25 511     G89 29                   Subjective: Pt reports continued progress, without setback  HEP going well  Anxious to continue making progress  Objective: See treatment diary below      Assessment: Tolerated treatment well  Patient demonstrated fatigue post treatment, exhibited good technique with therapeutic exercises and would benefit from continued PT      Plan: Continue per plan of care  Progress treatment as tolerated        Precautions: R Shoulder Adhesive Capsulitis    Daily Treatment Diary    HEP: Sheet provided and discussed    Manual  19            ART x20'            IASTM             JM             PROM and UE stretch             STM/Triggerpoint               Exercise Diary  19            IR Green strap stretch 2x10 10''            Pullies 3x10            Supine Wand ER/Flx Flex 3x10            Retro UBE x5'            Supine No Money 3x10            Supine No Money into Arvilla Marvin 3x10            Front Wall Slides             Lat Wall Slides             T-Band: Row             TB LTP             No Moneys 3x10            No Money into Wall Jose 2x10            Seated PROM ER with table             Prone Scaption             Prone Abd, Scap 3x10 ea            Prone scap retract with ER             SL Er/ ABD             T-Band ER             AROM Scaption             UBE: Retro             Scap Retraction 3x10            Scap Depression 3x10            TB ER             TB 45 degree Sh  Abd                                                    Body Blade                 Modalities  19            MHP x10'            CP             Stim

## 2019-07-22 ENCOUNTER — OFFICE VISIT (OUTPATIENT)
Dept: PHYSICAL THERAPY | Facility: CLINIC | Age: 64
End: 2019-07-22
Payer: MEDICARE

## 2019-07-22 DIAGNOSIS — M25.511 CHRONIC RIGHT SHOULDER PAIN: ICD-10-CM

## 2019-07-22 DIAGNOSIS — M75.01 ADHESIVE CAPSULITIS OF RIGHT SHOULDER: Primary | ICD-10-CM

## 2019-07-22 DIAGNOSIS — G89.29 CHRONIC RIGHT SHOULDER PAIN: ICD-10-CM

## 2019-07-22 PROCEDURE — 97112 NEUROMUSCULAR REEDUCATION: CPT | Performed by: PHYSICAL THERAPIST

## 2019-07-22 PROCEDURE — 97140 MANUAL THERAPY 1/> REGIONS: CPT | Performed by: PHYSICAL THERAPIST

## 2019-07-22 PROCEDURE — 97110 THERAPEUTIC EXERCISES: CPT | Performed by: PHYSICAL THERAPIST

## 2019-07-22 NOTE — PROGRESS NOTES
Daily Note     Today's date: 2019  Patient name: Mendoza Maier  : 1955  MRN: [id number]  Referring provider: Carina Lynch DO  Dx:   Encounter Diagnosis     ICD-10-CM    1  Adhesive capsulitis of right shoulder M75 01    2  Chronic right shoulder pain M25 511     G89 29                   Subjective: Pt reports she can tell she is doing much better  Biggest issue is Abduction AROM, but it continues to improve slowly, but surely  Objective: See treatment diary below      Assessment: Tolerated treatment well  Patient demonstrated fatigue post treatment, exhibited good technique with therapeutic exercises and would benefit from continued PT      Plan: Continue per plan of care  Progress treatment as tolerated        Precautions: R Shoulder Adhesive Capsulitis    Daily Treatment Diary    HEP: Sheet provided and discussed    Manual  19            ART x20'            IASTM             JM             PROM and UE stretch             STM/Triggerpoint               Exercise Diary  19            IR Green strap stretch 2x10 10''            Pullies 3x10            Supine Wand ER/Flx Flex 3x10            Retro UBE x5'            Supine No Money 3x10            Supine No Money into Gwendalyn Ruffing 3x10            Front Wall Slides             Lat Wall Slides             T-Band: Row             TB LTP             No Moneys 3x10            No Money into Wall Jose 2x10            Seated PROM ER with table             Prone Scaption             Prone Abd, Scap 3x10 ea            Prone scap retract with ER             SL Er/ ABD             T-Band ER             AROM Scaption             UBE: Retro             Scap Retraction 3x10            Scap Depression 3x10            TB ER             TB 45 degree Sh  Abd                                                    Body Blade                 Modalities  19            MHP x10'            CP             Stim

## 2019-07-24 ENCOUNTER — OFFICE VISIT (OUTPATIENT)
Dept: PHYSICAL THERAPY | Facility: CLINIC | Age: 64
End: 2019-07-24
Payer: MEDICARE

## 2019-07-24 DIAGNOSIS — M25.511 CHRONIC RIGHT SHOULDER PAIN: ICD-10-CM

## 2019-07-24 DIAGNOSIS — M75.01 ADHESIVE CAPSULITIS OF RIGHT SHOULDER: Primary | ICD-10-CM

## 2019-07-24 DIAGNOSIS — G89.29 CHRONIC RIGHT SHOULDER PAIN: ICD-10-CM

## 2019-07-24 PROCEDURE — 97530 THERAPEUTIC ACTIVITIES: CPT | Performed by: PHYSICAL THERAPIST

## 2019-07-24 PROCEDURE — 97112 NEUROMUSCULAR REEDUCATION: CPT | Performed by: PHYSICAL THERAPIST

## 2019-07-24 PROCEDURE — 97140 MANUAL THERAPY 1/> REGIONS: CPT | Performed by: PHYSICAL THERAPIST

## 2019-07-24 PROCEDURE — 97110 THERAPEUTIC EXERCISES: CPT | Performed by: PHYSICAL THERAPIST

## 2019-07-24 NOTE — PROGRESS NOTES
Daily Note     Today's date: 2019  Patient name: Lemuel Morley  : 1955  MRN: [id number]  Referring provider: Jennifer South DO  Dx:   Encounter Diagnosis     ICD-10-CM    1  Adhesive capsulitis of right shoulder M75 01    2  Chronic right shoulder pain M25 511     G89 29                   Subjective: Pt saw MD and wants her to continue with PT as she continues to improve  Pt reports she continues to do much better overall  Biggest issue remains Abduction AROM, but it continues to improve slowly, but surely  Objective: See treatment diary below      Assessment: Tolerated treatment well  Patient demonstrated fatigue post treatment, exhibited good technique with therapeutic exercises and would benefit from continued PT      Plan: Continue per plan of care  Progress treatment as tolerated        Precautions: R Shoulder Adhesive Capsulitis    Daily Treatment Diary    HEP: Sheet provided and discussed    Manual  19            ART x20'            IASTM             JM             PROM and UE stretch             STM/Triggerpoint               Exercise Diary  19            IR Green strap stretch 2x10 10''            Pullies 3x10            Supine Wand ER/Flx Flex 3x10            Retro UBE x5'            Supine No Money 3x10            Supine No Money into [employer] 3x10            Front Wall Slides             Lat Wall Slides             T-Band: Row             TB LTP             No Moneys 3x10            No Money into Wall Jose 2x10            Seated PROM ER with table             Prone Scaption             Prone Abd, Scap 3x10 ea            Prone scap retract with ER             SL Er/ ABD             T-Band ER             AROM Scaption             UBE: Retro             Scap Retraction 3x10            Scap Depression 3x10            TB ER             TB 45 degree Sh  Abd                                                    Body Blade                 Modalities  19            MHP x10' CP             Stim

## 2019-07-25 ENCOUNTER — OFFICE VISIT (OUTPATIENT)
Dept: PHYSICAL THERAPY | Facility: CLINIC | Age: 64
End: 2019-07-25
Payer: MEDICARE

## 2019-07-25 ENCOUNTER — TRANSCRIBE ORDERS (OUTPATIENT)
Dept: PHYSICAL THERAPY | Facility: CLINIC | Age: 64
End: 2019-07-25

## 2019-07-25 DIAGNOSIS — M75.01 ADHESIVE BURSITIS OF RIGHT SHOULDER: Primary | ICD-10-CM

## 2019-07-25 DIAGNOSIS — G89.29 CHRONIC RIGHT SHOULDER PAIN: ICD-10-CM

## 2019-07-25 DIAGNOSIS — M25.511 CHRONIC RIGHT SHOULDER PAIN: ICD-10-CM

## 2019-07-25 DIAGNOSIS — M75.01 ADHESIVE CAPSULITIS OF RIGHT SHOULDER: Primary | ICD-10-CM

## 2019-07-25 PROCEDURE — 97112 NEUROMUSCULAR REEDUCATION: CPT | Performed by: PHYSICAL THERAPIST

## 2019-07-25 PROCEDURE — 97530 THERAPEUTIC ACTIVITIES: CPT | Performed by: PHYSICAL THERAPIST

## 2019-07-25 PROCEDURE — 97110 THERAPEUTIC EXERCISES: CPT | Performed by: PHYSICAL THERAPIST

## 2019-07-25 PROCEDURE — 97140 MANUAL THERAPY 1/> REGIONS: CPT | Performed by: PHYSICAL THERAPIST

## 2019-07-25 NOTE — LETTER
2019    Mary Kate Clark DO  [address]  [address] [facility] [address]    Patient: Deena Cruz   YOB: 1955   Date of Visit: 2019     Encounter Diagnosis     ICD-10-CM    1  Adhesive capsulitis of right shoulder M75 01    2  Chronic right shoulder pain M25 511     G89 29        Dear Dr Guerline Lucero:    Please review the attached Plan of Care from [facility] 9 recent visit  Please verify that you agree therapy should continue by signing the attached document and sending it back to our office  If you have any questions or concerns, please don't hesitate to call  Sincerely,    Yanick Me, PT, DPT, ATC, ART      Referring Provider:      I certify that I have read the below Plan of Care and certify the need for these services furnished under this plan of treatment while under my care  Mary Kate Clark DO  [address]  [address] [facility] [address] [place] [address] 80: [phone]          PT Re-Evaluation     Today's date: 2019  Patient name: Deena Cruz  : 1955  MRN: [id number]  Referring provider: Carol Petty DO  Dx:   Encounter Diagnosis     ICD-10-CM    1  Adhesive capsulitis of right shoulder M75 01    2   Chronic right shoulder pain M25 511     G89 29                   Assessment  Understanding of Dx/Px/POC: good   Prognosis: good    Goals  STGs: To be complete within 4 weeks  - Decrease pain to < 2/10 at worst (partially met)  - Increase AROM to WNL (partially met)  - Increase strength to > 4+/5 (partially met)  - Improve postural awareness capacity to > 60min before deficit (partially met)    LTGs: To be complete within 6 weeks  - Able to repetitively complete all overhead activity without pain or limitation for increased safety and functional capacity with ADLs and home-related duty (partially met)  - Able to repetitively complete all reaching activity without pain or limitation for increased safety and functional capacity with ADLs and home-related duty (partially met)  - Able to repetitively complete all pushing/pulling activity without pain or limitation for increased safety and functional capacity with ADLs and home-related duty (partially met)  - Able to complete all lifting/carrying activity without pain or limitation for increased safety and functional capacity with ADLs and home-related duty (partially met)    Plan  Frequency: 3x week  Duration in weeks: 4       Upon completion of today's re-evaluation, as evidenced by present objective and subjective measures, Zulema's sx remain consistent with continued, well-paced progress from R Shoulder pain and dysfunction secondary to adhesive capsulitis  Pt saw MD and he wants her to continue with physical therapy  Patient will benefit from continued skilled physical therapy to address current deficits  Subjective Evaluation    Pain  Current pain rating: 3  At best pain ratin  At worst pain ratin  Location: R Shoulder         Pt reports continued progress with each session  Reports the increased ROM, strength, and stability are allowing her to be more functional at home  Pt reports she understands she has a lot of improvements to make yet, but knows she continues to move forward          Objective (+) Hawkin's, (+) Empty Can  Pain level ranges 0-5/10  AROM: Flex 170 degrees, Abd 120 degrees, ER 85 degrees, IR 40 degrees  Strength: R Shoulder ER, Abd 4-/5, Flex 4/5, IR 5/5  Postural Awareness: Fair to good (rounded shoulders, forward head)  PSFS: 7/10  Unable to complete overhead activity without pain and limitation, but improving  Unable to complete pushing/pulling activity without pain and limitation, but improving  Unable to complete lifting/carrying activity without pain and limitation, but improving  Unable to complete cross body/behind the back reaching activity without pain and limitation, but improving               Precautions: R Shoulder Adhesive Capsulitis    Daily Treatment Diary    HEP: Sheet provided and discussed    Manual  7/25/19            ART x20'            IASTM             JM             PROM and UE stretch             STM/Triggerpoint               Exercise Diary  7/25/19            IR Green strap stretch 2x10 10''            Pullies 3x10            Supine Wand ER/Flx Flex 3x10            Retro UBE x5'            Supine No Money 3x10            Supine No Money into [employer] 3x10            Front Wall Slides             Lat Wall Slides             T-Band: Row             TB LTP             No Moneys 3x10            No Money into Wall Jose 3x10            Seated PROM ER with table             Prone Scaption             Prone Abd, Scap 3x10 ea            Prone scap retract with ER             SL Er/ ABD             T-Band ER             AROM Scaption             UBE: Retro             Scap Retraction 3x10            Scap Depression 3x10            TB ER             TB 45 degree Sh  Abd                                                    Body Blade                 Modalities  7/25/19            MHP x10'            CP             Stim

## 2019-07-25 NOTE — PROGRESS NOTES
PT Re-Evaluation     Today's date: 2019  Patient name: Cinthia Pérez  : 1955  MRN: [id number]  Referring provider: Naty Iniguez DO  Dx:   Encounter Diagnosis     ICD-10-CM    1  Adhesive capsulitis of right shoulder M75 01    2  Chronic right shoulder pain M25 511     G89 29                   Assessment  Understanding of Dx/Px/POC: good   Prognosis: good    Goals  STGs: To be complete within 4 weeks  - Decrease pain to < 2/10 at worst (partially met)  - Increase AROM to WNL (partially met)  - Increase strength to > 4+/5 (partially met)  - Improve postural awareness capacity to > 60min before deficit (partially met)    LTGs: To be complete within 6 weeks  - Able to repetitively complete all overhead activity without pain or limitation for increased safety and functional capacity with ADLs and home-related duty (partially met)  - Able to repetitively complete all reaching activity without pain or limitation for increased safety and functional capacity with ADLs and home-related duty (partially met)  - Able to repetitively complete all pushing/pulling activity without pain or limitation for increased safety and functional capacity with ADLs and home-related duty (partially met)  - Able to complete all lifting/carrying activity without pain or limitation for increased safety and functional capacity with ADLs and home-related duty (partially met)    Plan  Frequency: 3x week  Duration in weeks: 4       Upon completion of today's re-evaluation, as evidenced by present objective and subjective measures, Zulema's sx remain consistent with continued, well-paced progress from R Shoulder pain and dysfunction secondary to adhesive capsulitis  Pt saw MD and he wants her to continue with physical therapy  Patient will benefit from continued skilled physical therapy to address current deficits        Subjective Evaluation    Pain  Current pain rating: 3  At best pain ratin  At worst pain ratin  Location: R Shoulder         Pt reports continued progress with each session  Reports the increased ROM, strength, and stability are allowing her to be more functional at home  Pt reports she understands she has a lot of improvements to make yet, but knows she continues to move forward          Objective (+) [employer], (+) Empty Can  Pain level ranges 0-5/10  AROM: Flex 170 degrees, Abd 120 degrees, ER 85 degrees, IR 40 degrees  Strength: R Shoulder ER, Abd 4-/5, Flex 4/5, IR 5/5  Postural Awareness: Fair to good (rounded shoulders, forward head)  PSFS: 7/10  Unable to complete overhead activity without pain and limitation, but improving  Unable to complete pushing/pulling activity without pain and limitation, but improving  Unable to complete lifting/carrying activity without pain and limitation, but improving  Unable to complete cross body/behind the back reaching activity without pain and limitation, but improving               Precautions: R Shoulder Adhesive Capsulitis    Daily Treatment Diary    HEP: Sheet provided and discussed    Manual  7/25/19            ART x20'            IASTM             JM             PROM and UE stretch             STM/Triggerpoint               Exercise Diary  7/25/19            IR Green strap stretch 2x10 10''            Pullies 3x10            Supine Wand ER/Flx Flex 3x10            Retro UBE x5'            Supine No Money 3x10            Supine No Money into [place] 3x10            Front Wall Slides             Lat Wall Slides             T-Band: Row             TB LTP             No Moneys 3x10            No Money into Wall Jose 3x10            Seated PROM ER with table             Prone Scaption             Prone Abd, Scap 3x10 ea            Prone scap retract with ER             SL Er/ ABD             T-Band ER             AROM Scaption             UBE: Retro             Scap Retraction 3x10            Scap Depression 3x10            TB ER             TB 45 degree Sh  Abd Body Blade                 Modalities  7/25/19            MHP x10'            CP             Stim

## 2019-07-27 ENCOUNTER — APPOINTMENT (OUTPATIENT)
Dept: LAB | Facility: HOSPITAL | Age: 64
End: 2019-07-27
Attending: FAMILY MEDICINE
Payer: MEDICARE

## 2019-07-27 ENCOUNTER — HOSPITAL ENCOUNTER (OUTPATIENT)
Dept: RADIOLOGY | Facility: HOSPITAL | Age: 64
Discharge: HOME | End: 2019-07-27
Attending: FAMILY MEDICINE
Payer: MEDICARE

## 2019-07-27 DIAGNOSIS — D86.9 SARCOIDOSIS: ICD-10-CM

## 2019-07-27 LAB
ALBUMIN SERPL BCP-MCNC: 3.6 G/DL (ref 3.5–5)
ALP SERPL-CCNC: 50 U/L (ref 46–116)
ALT SERPL W P-5'-P-CCNC: 28 U/L (ref 12–78)
AST SERPL W P-5'-P-CCNC: 25 U/L (ref 5–45)
BILIRUB DIRECT SERPL-MCNC: 0.12 MG/DL (ref 0–0.2)
BILIRUB SERPL-MCNC: 0.4 MG/DL (ref 0.2–1)
PROT SERPL-MCNC: 6.9 G/DL (ref 6.4–8.2)

## 2019-07-27 PROCEDURE — 36415 COLL VENOUS BLD VENIPUNCTURE: CPT | Performed by: FAMILY MEDICINE

## 2019-07-27 PROCEDURE — 71046 X-RAY EXAM CHEST 2 VIEWS: CPT

## 2019-07-27 PROCEDURE — 80076 HEPATIC FUNCTION PANEL: CPT | Performed by: FAMILY MEDICINE

## 2019-07-31 ENCOUNTER — OFFICE VISIT (OUTPATIENT)
Dept: PHYSICAL THERAPY | Facility: CLINIC | Age: 64
End: 2019-07-31
Payer: MEDICARE

## 2019-07-31 DIAGNOSIS — M75.01 ADHESIVE CAPSULITIS OF RIGHT SHOULDER: Primary | ICD-10-CM

## 2019-07-31 DIAGNOSIS — G89.29 CHRONIC RIGHT SHOULDER PAIN: ICD-10-CM

## 2019-07-31 DIAGNOSIS — M25.511 CHRONIC RIGHT SHOULDER PAIN: ICD-10-CM

## 2019-07-31 PROCEDURE — 97140 MANUAL THERAPY 1/> REGIONS: CPT | Performed by: PHYSICAL THERAPIST

## 2019-07-31 PROCEDURE — 97110 THERAPEUTIC EXERCISES: CPT | Performed by: PHYSICAL THERAPIST

## 2019-07-31 PROCEDURE — 97112 NEUROMUSCULAR REEDUCATION: CPT | Performed by: PHYSICAL THERAPIST

## 2019-07-31 NOTE — PROGRESS NOTES
Daily Note     Today's date: 2019  Patient name: Ingrid Christianson  : 1955  MRN: [id number]  Referring provider: Gil Luque DO  Dx:   Encounter Diagnosis     ICD-10-CM    1  Adhesive capsulitis of right shoulder M75 01    2  Chronic right shoulder pain M25 511     G89 29                   Subjective: Pt reports she continues to attempt to advance ROM outside of PT  Anxious to continue attempting to make progress  Objective: See treatment diary below      Assessment: Tolerated treatment well  Patient demonstrated fatigue post treatment, exhibited good technique with therapeutic exercises and would benefit from continued PT      Plan: Continue per plan of care  Progress treatment as tolerated        Precautions: R Shoulder Adhesive Capsulitis    Daily Treatment Diary    HEP: Sheet provided and discussed    Manual  19            ART x20'            IASTM             JM             PROM and UE stretch             STM/Triggerpoint               Exercise Diary  19            IR Green strap stretch 2x10 10''            Pullies 3x10            Supine Wand ER/Flx Flex 3x10            Retro UBE x5'            Supine No Money 3x10            Supine No Money into [employer] 3x10            Front Wall Slides             Lat Wall Slides             T-Band: Row             TB LTP             No Moneys 3x10            No Money into Wall Jose 2x10            Seated PROM ER with table             Prone Scaption             Prone Abd, Scap 3x10 ea            Prone scap retract with ER             SL Er/ ABD             T-Band ER             AROM Scaption             UBE: Retro             Scap Retraction 3x10            Scap Depression 3x10            TB ER             TB 45 degree Sh  Abd                                                    Body Blade                 Modalities  19            MHP x10'            CP             Stim

## 2019-08-01 ENCOUNTER — OFFICE VISIT (OUTPATIENT)
Dept: PHYSICAL THERAPY | Facility: CLINIC | Age: 64
End: 2019-08-01
Payer: MEDICARE

## 2019-08-01 DIAGNOSIS — M25.511 CHRONIC RIGHT SHOULDER PAIN: ICD-10-CM

## 2019-08-01 DIAGNOSIS — M75.01 ADHESIVE CAPSULITIS OF RIGHT SHOULDER: Primary | ICD-10-CM

## 2019-08-01 DIAGNOSIS — G89.29 CHRONIC RIGHT SHOULDER PAIN: ICD-10-CM

## 2019-08-01 PROCEDURE — 97530 THERAPEUTIC ACTIVITIES: CPT | Performed by: PHYSICAL THERAPIST

## 2019-08-01 PROCEDURE — 97140 MANUAL THERAPY 1/> REGIONS: CPT | Performed by: PHYSICAL THERAPIST

## 2019-08-01 PROCEDURE — 97110 THERAPEUTIC EXERCISES: CPT | Performed by: PHYSICAL THERAPIST

## 2019-08-01 PROCEDURE — 97112 NEUROMUSCULAR REEDUCATION: CPT | Performed by: PHYSICAL THERAPIST

## 2019-08-01 NOTE — PROGRESS NOTES
Daily Note     Today's date: 2019  Patient name: Allyn Phan  : 1955  MRN: [id number]  Referring provider: Leonardo Knapp DO  Dx:   Encounter Diagnosis     ICD-10-CM    1  Adhesive capsulitis of right shoulder M75 01    2  Chronic right shoulder pain M25 511     G89 29                   Subjective: Pt reports she continues to attempt to make progress  Anxious and hopeful to continue making progress  Objective: See treatment diary below      Assessment: Tolerated treatment well  Patient demonstrated fatigue post treatment, exhibited good technique with therapeutic exercises and would benefit from continued PT      Plan: Continue per plan of care  Progress treatment as tolerated        Precautions: R Shoulder Adhesive Capsulitis    Daily Treatment Diary    HEP: Sheet provided and discussed    Manual  19            ART x20'            IASTM             JM             PROM and UE stretch             STM/Triggerpoint               Exercise Diary  19            IR Green strap stretch 2x10 10''            Pullies 3x10            Supine Wand ER/Flx Flex 3x10            Retro UBE x5'            Supine No Money 3x10            Supine No Money into [employer] 3x10            Front Wall Slides             Lat Wall Slides             T-Band: Row             TB LTP             No Moneys 3x10            No Money into Wall Jose 2x10            Seated PROM ER with table             Prone Scaption             Prone Abd, Scap 3x10 ea            Prone scap retract with ER             SL Er/ ABD             T-Band ER             AROM Scaption             UBE: Retro             Scap Retraction 3x10            Scap Depression 3x10            TB ER             TB 45 degree Sh  Abd                                                    Body Blade                 Modalities  19            MHP x10'            CP             Stim

## 2019-08-05 ENCOUNTER — OFFICE VISIT (OUTPATIENT)
Dept: PHYSICAL THERAPY | Facility: CLINIC | Age: 64
End: 2019-08-05
Payer: MEDICARE

## 2019-08-05 DIAGNOSIS — G89.29 CHRONIC RIGHT SHOULDER PAIN: ICD-10-CM

## 2019-08-05 DIAGNOSIS — M25.511 CHRONIC RIGHT SHOULDER PAIN: ICD-10-CM

## 2019-08-05 DIAGNOSIS — M75.01 ADHESIVE CAPSULITIS OF RIGHT SHOULDER: Primary | ICD-10-CM

## 2019-08-05 PROCEDURE — 97110 THERAPEUTIC EXERCISES: CPT | Performed by: PHYSICAL THERAPIST

## 2019-08-05 PROCEDURE — 97112 NEUROMUSCULAR REEDUCATION: CPT | Performed by: PHYSICAL THERAPIST

## 2019-08-05 PROCEDURE — 97140 MANUAL THERAPY 1/> REGIONS: CPT | Performed by: PHYSICAL THERAPIST

## 2019-08-05 NOTE — PROGRESS NOTES
Daily Note     Today's date: 2019  Patient name: Morelia Mcgovern  : 1955  MRN: [id number]  Referring provider: Manisha Jackson DO  Dx:   Encounter Diagnosis     ICD-10-CM    1  Adhesive capsulitis of right shoulder M75 01    2  Chronic right shoulder pain M25 511     G89 29                   Subjective: Pt reports she continues to attempt to make progress  Anxious and hopeful to continue making progress  Objective: See treatment diary below      Assessment: Tolerated treatment well  Patient demonstrated fatigue post treatment, exhibited good technique with therapeutic exercises and would benefit from continued PT      Plan: Continue per plan of care  Progress treatment as tolerated        Precautions: R Shoulder Adhesive Capsulitis    Daily Treatment Diary    HEP: Sheet provided and discussed    Manual  19            ART x20'            IASTM             JM             PROM and UE stretch             STM/Triggerpoint               Exercise Diary  19            IR Green strap stretch 2x10 10''            Pullies 3x10            Supine Wand ER/Flx Flex 3x10            Retro UBE x5'            Supine No Money 3x10            Supine No Money into Leim Ali 3x10            Front Wall Slides             Lat Wall Slides             T-Band: Row             TB LTP             No Moneys 3x10            No Money into Wall Jose 2x10            Seated PROM ER with table             Prone Scaption             Prone Abd, Scap 3x10 ea            Prone scap retract with ER             SL Er/ ABD             T-Band ER             AROM Scaption             UBE: Retro             Scap Retraction 3x10            Scap Depression 3x10            TB ER             TB 45 degree Sh  Abd                                                    Body Blade                 Modalities  19            MHP x10'            CP             Stim

## 2019-08-06 ENCOUNTER — OFFICE VISIT (OUTPATIENT)
Dept: PHYSICAL THERAPY | Facility: CLINIC | Age: 64
End: 2019-08-06
Payer: MEDICARE

## 2019-08-06 DIAGNOSIS — M75.01 ADHESIVE CAPSULITIS OF RIGHT SHOULDER: Primary | ICD-10-CM

## 2019-08-06 DIAGNOSIS — M25.511 CHRONIC RIGHT SHOULDER PAIN: ICD-10-CM

## 2019-08-06 DIAGNOSIS — G89.29 CHRONIC RIGHT SHOULDER PAIN: ICD-10-CM

## 2019-08-06 PROCEDURE — 97112 NEUROMUSCULAR REEDUCATION: CPT | Performed by: PHYSICAL THERAPIST

## 2019-08-06 PROCEDURE — 97110 THERAPEUTIC EXERCISES: CPT | Performed by: PHYSICAL THERAPIST

## 2019-08-06 PROCEDURE — 97140 MANUAL THERAPY 1/> REGIONS: CPT | Performed by: PHYSICAL THERAPIST

## 2019-08-06 PROCEDURE — 97530 THERAPEUTIC ACTIVITIES: CPT | Performed by: PHYSICAL THERAPIST

## 2019-08-06 NOTE — PROGRESS NOTES
Daily Note     Today's date: 2019  Patient name: Allyn Phan  : 1955  MRN: [id number]  Referring provider: Leonardo Knapp DO  Dx:   Encounter Diagnosis     ICD-10-CM    1  Adhesive capsulitis of right shoulder M75 01    2  Chronic right shoulder pain M25 511     G89 29                   Subjective: Pt reports she continues to attempt to make progress  Anxious and hopeful to continue making progress  Objective: See treatment diary below      Assessment: Tolerated treatment well  Patient demonstrated fatigue post treatment, exhibited good technique with therapeutic exercises and would benefit from continued PT      Plan: Continue per plan of care  Progress treatment as tolerated        Precautions: R Shoulder Adhesive Capsulitis    Daily Treatment Diary    HEP: Sheet provided and discussed    Manual  19            ART x20'            IASTM             JM             PROM and UE stretch             STM/Triggerpoint               Exercise Diary  19            IR Green strap stretch 2x10 10''            Pullies 3x10            Supine Wand ER/Flx Flex 3x10            Retro UBE x5'            Supine No Money 3x10            Supine No Money into [facility] 3x10            Front Wall Slides             Lat Wall Slides 3x10            T-Band: Row             TB LTP             No Moneys 3x10            No Money into Wall Jose 2x10            Seated PROM ER with table             Prone Scaption             Prone Abd, Scap 3x10 ea            Prone scap retract with ER             SL Er/ ABD             T-Band ER             AROM Scaption             UBE: Retro             Scap Retraction 3x10            Scap Depression 3x10            TB ER             TB 45 degree Sh  Abd                                                    Body Blade                 Modalities  19            MHP x10'            CP             Stim

## 2019-08-08 ENCOUNTER — OFFICE VISIT (OUTPATIENT)
Dept: PHYSICAL THERAPY | Facility: CLINIC | Age: 64
End: 2019-08-08
Payer: MEDICARE

## 2019-08-08 DIAGNOSIS — M75.01 ADHESIVE CAPSULITIS OF RIGHT SHOULDER: Primary | ICD-10-CM

## 2019-08-08 DIAGNOSIS — G89.29 CHRONIC RIGHT SHOULDER PAIN: ICD-10-CM

## 2019-08-08 DIAGNOSIS — M25.511 CHRONIC RIGHT SHOULDER PAIN: ICD-10-CM

## 2019-08-08 PROCEDURE — 97140 MANUAL THERAPY 1/> REGIONS: CPT | Performed by: PHYSICAL THERAPIST

## 2019-08-08 PROCEDURE — 97530 THERAPEUTIC ACTIVITIES: CPT | Performed by: PHYSICAL THERAPIST

## 2019-08-08 PROCEDURE — 97110 THERAPEUTIC EXERCISES: CPT | Performed by: PHYSICAL THERAPIST

## 2019-08-08 PROCEDURE — 97112 NEUROMUSCULAR REEDUCATION: CPT | Performed by: PHYSICAL THERAPIST

## 2019-08-14 ENCOUNTER — OFFICE VISIT (OUTPATIENT)
Dept: PHYSICAL THERAPY | Facility: CLINIC | Age: 64
End: 2019-08-14
Payer: MEDICARE

## 2019-08-14 DIAGNOSIS — G89.29 CHRONIC RIGHT SHOULDER PAIN: ICD-10-CM

## 2019-08-14 DIAGNOSIS — M25.511 CHRONIC RIGHT SHOULDER PAIN: ICD-10-CM

## 2019-08-14 DIAGNOSIS — M75.01 ADHESIVE CAPSULITIS OF RIGHT SHOULDER: Primary | ICD-10-CM

## 2019-08-14 PROCEDURE — 97140 MANUAL THERAPY 1/> REGIONS: CPT | Performed by: PHYSICAL THERAPIST

## 2019-08-14 PROCEDURE — 97112 NEUROMUSCULAR REEDUCATION: CPT | Performed by: PHYSICAL THERAPIST

## 2019-08-14 PROCEDURE — 97110 THERAPEUTIC EXERCISES: CPT | Performed by: PHYSICAL THERAPIST

## 2019-08-14 PROCEDURE — 97530 THERAPEUTIC ACTIVITIES: CPT | Performed by: PHYSICAL THERAPIST

## 2019-08-14 NOTE — PROGRESS NOTES
Daily Note     Today's date: 2019  Patient name: August Barillas  : 1955  MRN: [id number]  Referring provider: Page Todd DO  Dx:   Encounter Diagnosis     ICD-10-CM    1  Adhesive capsulitis of right shoulder M75 01    2  Chronic right shoulder pain M25 511     G89 29                   Subjective: Pt reports she is becoming more independent with HEP  Objective: See treatment diary below      Assessment: Tolerated treatment well  Patient demonstrated fatigue post treatment, exhibited good technique with therapeutic exercises and would benefit from continued PT      Plan: Continue per plan of care  Potential discharge next visit      Precautions: R Shoulder Adhesive Capsulitis    Daily Treatment Diary    HEP: Sheet provided and discussed    Manual  19            ART x20'            IASTM             JM             PROM and UE stretch             STM/Triggerpoint               Exercise Diary  19            IR Green strap stretch 2x10 10''            Pullies 3x10            Supine Wand ER/Flx Flex 3x10            Retro UBE x5'            Supine No Money 3x10            Supine No Money into Jearl Fillers 3x10            Front Wall Slides             Lat Wall Slides 3x10            T-Band: Row             TB LTP             No Moneys 3x10            No Money into Wall Jose 2x10            Seated PROM ER with table             Prone Scaption             Prone Abd, Scap 3x10 ea            Prone scap retract with ER             SL Er/ ABD             T-Band ER             AROM Scaption             UBE: Retro             Scap Retraction 3x10            Scap Depression 3x10            TB ER             TB 45 degree Sh  Abd                                                    Body Blade                 Modalities  19            MHP x10'            CP             Stim

## 2019-08-15 ENCOUNTER — OFFICE VISIT (OUTPATIENT)
Dept: PHYSICAL THERAPY | Facility: CLINIC | Age: 64
End: 2019-08-15
Payer: MEDICARE

## 2019-08-15 DIAGNOSIS — G89.29 CHRONIC RIGHT SHOULDER PAIN: ICD-10-CM

## 2019-08-15 DIAGNOSIS — M75.01 ADHESIVE CAPSULITIS OF RIGHT SHOULDER: Primary | ICD-10-CM

## 2019-08-15 DIAGNOSIS — M25.511 CHRONIC RIGHT SHOULDER PAIN: ICD-10-CM

## 2019-08-15 PROCEDURE — 97112 NEUROMUSCULAR REEDUCATION: CPT | Performed by: PHYSICAL THERAPIST

## 2019-08-15 PROCEDURE — 97530 THERAPEUTIC ACTIVITIES: CPT | Performed by: PHYSICAL THERAPIST

## 2019-08-15 PROCEDURE — 97110 THERAPEUTIC EXERCISES: CPT | Performed by: PHYSICAL THERAPIST

## 2019-08-15 PROCEDURE — 97140 MANUAL THERAPY 1/> REGIONS: CPT | Performed by: PHYSICAL THERAPIST

## 2019-08-15 NOTE — PROGRESS NOTES
PT Discharge    Today's date: 8/15/2019  Patient name: Jignesh Haynes  : 1955  MRN: [id number]  Referring provider: Pedro Hicks DO  Dx:   Encounter Diagnosis     ICD-10-CM    1  Adhesive capsulitis of right shoulder M75 01    2  Chronic right shoulder pain M25 511     G89 29                     Goals  STGs: To be complete within 4 weeks  - Decrease pain to < 2/10 at worst (partially met)  - Increase AROM to WNL (partially met)  - Increase strength to > 4+/5 (met)  - Improve postural awareness capacity to > 60min before deficit (met)    LTGs: To be complete within 6 weeks  - Able to repetitively complete all overhead activity without pain or limitation for increased safety and functional capacity with ADLs and home-related duty (partially met)  - Able to repetitively complete all reaching activity without pain or limitation for increased safety and functional capacity with ADLs and home-related duty (partially met)  - Able to repetitively complete all pushing/pulling activity without pain or limitation for increased safety and functional capacity with ADLs and home-related duty (partially met)  - Able to complete all lifting/carrying activity without pain or limitation for increased safety and functional capacity with ADLs and home-related duty (partially met)    Plan  Frequency: 3x week  Duration in weeks: 4       Pt will be D/C from physical therapy after today's session as she has become independent with HEP  Pt has a good understanding of HEP and has been instructed to reach out with any questions/concerns/setbacks  Subjective Evaluation    Pain  Current pain ratin  At best pain ratin  At worst pain rating: 3  Location: R Shoulder         Pt reports she feels ready to safely D/C to HEP after today's session as she has become independent with HEP  Pt reports she has a good understanding of HEP and will reach out with any questions/concerns/setbacks          Objective (-) [place], (-) Empty Can  Pain level ranges 0-3/10  AROM: Flex 180 degrees, Abd 150 degrees, ER 90 degrees, IR 50 degrees  Strength: R Shoulder ER, Abd 5/5, Flex 5/5, IR 5/5  Postural Awareness: Good (rounded shoulders, forward head)  PSFS: 8/10  Unable to complete overhead activity at 80% capacity  Unable to complete pushing/pulling activity at 80% capacity  Able to complete lifting/carrying activity at 80% capacity  Able to complete cross body/behind the back reaching activity at 80% capacity               Precautions: R Shoulder Adhesive Capsulitis    Daily Treatment Diary    HEP: Sheet provided and discussed    Manual  8/15/19            ART x20'            IASTM             JM             PROM and UE stretch             STM/Triggerpoint               Exercise Diary  8/15/19            IR Green strap stretch 2x10 10''            Pullies 3x10            Supine Wand ER/Flx Flex 3x10            Retro UBE x5'            Supine No Money 3x10            Supine No Money into [employer] 3x10            Front Wall Slides             Lat Wall Slides 3x10            T-Band: Row             TB LTP             No Moneys 3x10            No Money into Wall Jose 2x10            Seated PROM ER with table             Prone Scaption             Prone Abd, Scap 3x10 ea            Prone scap retract with ER             SL Er/ ABD             T-Band ER             AROM Scaption             UBE: Retro             Scap Retraction 3x10            Scap Depression 3x10            TB ER             TB 45 degree Sh  Abd                                                    Body Blade                 Modalities  8/15/19            MHP x10'            CP             Stim

## 2019-08-19 ENCOUNTER — EVALUATION (OUTPATIENT)
Dept: PHYSICAL THERAPY | Facility: CLINIC | Age: 64
End: 2019-08-19
Payer: MEDICARE

## 2019-08-19 ENCOUNTER — TRANSCRIBE ORDERS (OUTPATIENT)
Dept: PHYSICAL THERAPY | Facility: CLINIC | Age: 64
End: 2019-08-19

## 2019-08-19 DIAGNOSIS — M54.42 CHRONIC BILATERAL LOW BACK PAIN WITH BILATERAL SCIATICA: Primary | ICD-10-CM

## 2019-08-19 DIAGNOSIS — M75.01 ADHESIVE CAPSULITIS OF RIGHT SHOULDER: Primary | ICD-10-CM

## 2019-08-19 DIAGNOSIS — M54.41 CHRONIC BILATERAL LOW BACK PAIN WITH BILATERAL SCIATICA: Primary | ICD-10-CM

## 2019-08-19 DIAGNOSIS — G89.29 CHRONIC BILATERAL LOW BACK PAIN WITH BILATERAL SCIATICA: Primary | ICD-10-CM

## 2019-08-19 PROCEDURE — 97110 THERAPEUTIC EXERCISES: CPT | Performed by: PHYSICAL THERAPIST

## 2019-08-19 PROCEDURE — 97535 SELF CARE MNGMENT TRAINING: CPT | Performed by: PHYSICAL THERAPIST

## 2019-08-19 PROCEDURE — 97140 MANUAL THERAPY 1/> REGIONS: CPT | Performed by: PHYSICAL THERAPIST

## 2019-08-19 PROCEDURE — 97161 PT EVAL LOW COMPLEX 20 MIN: CPT | Performed by: PHYSICAL THERAPIST

## 2019-08-19 NOTE — LETTER
2019    Sam Power, MD [address] [place] [address]    Patient: Lucie Yung   YOB: 1955   Date of Visit: 2019     Encounter Diagnosis     ICD-10-CM    1  Adhesive capsulitis of right shoulder M75 01    2  Chronic right shoulder pain M25 511     G89 29        Dear Dr Angelica rAriaga: Thank you for your recent referral of Lucie Yung  Please review the attached evaluation summary from Zulema's recent visit  Please verify that you agree with the plan of care by signing the attached order  If you have any questions or concerns, please do not hesitate to call  I sincerely appreciate the opportunity to share in the care of one of your patients and hope to have another opportunity to work with you in the near future  Sincerely,    Td Vaughn, PT, DPT, ATC, ART      Referring Provider:      I certify that I have read the below Plan of Care and certify the need for these services furnished under this plan of treatment while under my care  Sam Power, MD [address] [address] [place] [id number]  VIA Facsimile: [phone]          PT Evaluation     Today's date: 2019  Patient name: Lucie Yung  : 1955  MRN: [id number]  Referring provider: Phil Haider MD  Dx:   Encounter Diagnosis     ICD-10-CM    1  Adhesive capsulitis of right shoulder M75 01    2  Chronic right shoulder pain M25 511     G89 29                   Assessment/Plan Pt is a very pleasant 71 y o  female with chronic LBP who presents with functional deficits including decreased capacity with sitting, lifting/carrying, transfers, and bending over at the trunk  Upon completion of today's initial evaluation, Zulema's sx remain consistent with posterior lumbar derangement secondary to chronic postural dysfunction and improper functional movement mechanics   Pt will benefit from skilled physical therapy to address current deficits  Subjective Pt reports she has had chronic back pain for many years that intermittently radiates down either her L or R LE at various times of the day  Pt reports her sx have continued to worsen and negatively impact her overall safety and functional capacity with ADLs and home-related duty      Objective Pain level ranges 2-8/10  AROM: Lumbar extension 50% decreased, bilateral hip extension and internal rotation 50% decreased  Strength: Core stability and post/lat LE chain strength 3+/5  Postural Awareness: Poor (rounded shoulders, forward head)  Repeated movement testing: (+) repeated movement testing for post lumbar derangement  Hip flexor flexibility: Mod deficits (+) Bruce Test  Unable sit without pain and limitation  Unable to bend over at trunk without pain and limitation  Unable to complete lifting/carrying activity without pain and limitation  Unable to complete transfers without pain and limitation           Precautions: None noted at this time    Daily Treatment Diary     HEP: Sheet provided and discussed    Manual  8/19/19            ART x10'            IASTM             JM Prone P to A grade I, II JM x5'            Stretch             Triggerpoint                 Exercise Diary  8/19/19            Prone on elbows x10'                         Prone Press Ups 10x            Front Wall Glides             Lateral Wall Glides             LTR             SKTC             DKTC             PB fwd trunk flx             Prone hip ext 2x10 ea            Prone hip IR with TB 3x10 L2            Prone hip ER with Ball 3x10            Prone Hip flexor stretch              No Shoe AE Steamboats             Lateral walking              QKB             Cat Backs             On elbows hip ext                                           Modalities  8/19/19            MHP             CP Prone x10'            STIM

## 2019-08-19 NOTE — PROGRESS NOTES
PT Evaluation     Today's date: 2019  Patient name: Magno Abdullahi  : 1955  MRN: [id number]  Referring provider: Ilene Plata MD  Dx:   Encounter Diagnosis     ICD-10-CM    1  Adhesive capsulitis of right shoulder M75 01    2  Chronic right shoulder pain M25 511     G89 29                   Assessment/Plan Pt is a very pleasant 71 y o  female with chronic LBP who presents with functional deficits including decreased capacity with sitting, lifting/carrying, transfers, and bending over at the trunk  Upon completion of today's initial evaluation, Zulema's sx remain consistent with posterior lumbar derangement secondary to chronic postural dysfunction and improper functional movement mechanics  Pt will benefit from skilled physical therapy to address current deficits  Subjective Pt reports she has had chronic back pain for many years that intermittently radiates down either her L or R LE at various times of the day  Pt reports her sx have continued to worsen and negatively impact her overall safety and functional capacity with ADLs and home-related duty      Objective Pain level ranges 2-8/10  AROM: Lumbar extension 50% decreased, bilateral hip extension and internal rotation 50% decreased  Strength: Core stability and post/lat LE chain strength 3+/5  Postural Awareness: Poor (rounded shoulders, forward head)  Repeated movement testing: (+) repeated movement testing for post lumbar derangement  Hip flexor flexibility: Mod deficits (+) Bruce Test  Unable sit without pain and limitation  Unable to bend over at trunk without pain and limitation  Unable to complete lifting/carrying activity without pain and limitation  Unable to complete transfers without pain and limitation           Precautions: None noted at this time    Daily Treatment Diary     HEP: Sheet provided and discussed    Manual  19            ART x10'            IASTM             JORDEN Prone P to A grade I, II JM x5'            Stretch Triggerpoint                 Exercise Diary  8/19/19            Prone on elbows x10'                         Prone Press Ups 10x            Front Wall Glides             Lateral Wall Glides             LTR             SKTC             DKTC             PB fwd trunk flx             Prone hip ext 2x10 ea            Prone hip IR with TB 3x10 L2            Prone hip ER with Ball 3x10            Prone Hip flexor stretch              No Shoe AE Steamboats             Lateral walking              QKB             Cat Backs             On elbows hip ext                                           Modalities  8/19/19            MHP             CP Prone x10'            STIM

## 2019-08-22 ENCOUNTER — OFFICE VISIT (OUTPATIENT)
Dept: PHYSICAL THERAPY | Facility: CLINIC | Age: 64
End: 2019-08-22
Payer: MEDICARE

## 2019-08-22 DIAGNOSIS — G89.29 CHRONIC BILATERAL LOW BACK PAIN WITH BILATERAL SCIATICA: Primary | ICD-10-CM

## 2019-08-22 DIAGNOSIS — M54.42 CHRONIC BILATERAL LOW BACK PAIN WITH BILATERAL SCIATICA: Primary | ICD-10-CM

## 2019-08-22 DIAGNOSIS — M54.41 CHRONIC BILATERAL LOW BACK PAIN WITH BILATERAL SCIATICA: Primary | ICD-10-CM

## 2019-08-22 PROCEDURE — 97110 THERAPEUTIC EXERCISES: CPT | Performed by: PHYSICAL THERAPIST

## 2019-08-22 PROCEDURE — 97140 MANUAL THERAPY 1/> REGIONS: CPT | Performed by: PHYSICAL THERAPIST

## 2019-08-22 PROCEDURE — 97112 NEUROMUSCULAR REEDUCATION: CPT | Performed by: PHYSICAL THERAPIST

## 2019-08-22 NOTE — PROGRESS NOTES
Daily Note     Today's date: 2019  Patient name: Patria Whitney  : 1955  MRN: [id number]  Referring provider: Valarie Mitchell MD  Dx:   Encounter Diagnosis     ICD-10-CM    1  Adhesive capsulitis of right shoulder M75 01    2  Chronic right shoulder pain M25 511     G89 29                   Subjective: Pt reports HEP going well  No setbacks  Anxious to continue making progress      Objective: See treatment diary below      Assessment: Tolerated treatment well  Patient demonstrated fatigue post treatment, exhibited good technique with therapeutic exercises and would benefit from continued PT      Plan: Continue per plan of care  Progress treatment as tolerated         Precautions: None noted at this time    Daily Treatment Diary     HEP: Sheet provided and discussed    Manual  19            ART x10'            IASTM             JM Prone P to A grade I, II JM x5'            Stretch             Triggerpoint                 Exercise Diary  19            Prone on elbows x10'                         Prone Press Ups 3x10            Front Wall Glides             Lateral Wall Glides             LTR             SKTC             DKTC             PB fwd trunk flx             Prone hip ext 3x10 ea            Prone hip IR with TB 3x10 L2            Prone hip ER with Ball 3x10            Prone Hip flexor stretch              No Shoe AE Steamboats             Lateral walking              QKB             Cat Backs             On elbows hip ext                                           Modalities  19            MHP             CP Prone x10'            STIM

## 2019-08-27 ENCOUNTER — OFFICE VISIT (OUTPATIENT)
Dept: PHYSICAL THERAPY | Facility: CLINIC | Age: 64
End: 2019-08-27
Payer: MEDICARE

## 2019-08-27 DIAGNOSIS — G89.29 CHRONIC BILATERAL LOW BACK PAIN WITH BILATERAL SCIATICA: Primary | ICD-10-CM

## 2019-08-27 DIAGNOSIS — M54.42 CHRONIC BILATERAL LOW BACK PAIN WITH BILATERAL SCIATICA: Primary | ICD-10-CM

## 2019-08-27 DIAGNOSIS — M54.41 CHRONIC BILATERAL LOW BACK PAIN WITH BILATERAL SCIATICA: Primary | ICD-10-CM

## 2019-08-27 PROCEDURE — 97112 NEUROMUSCULAR REEDUCATION: CPT | Performed by: PHYSICAL THERAPIST

## 2019-08-27 PROCEDURE — 97140 MANUAL THERAPY 1/> REGIONS: CPT | Performed by: PHYSICAL THERAPIST

## 2019-08-27 PROCEDURE — 97110 THERAPEUTIC EXERCISES: CPT | Performed by: PHYSICAL THERAPIST

## 2019-08-27 NOTE — PROGRESS NOTES
Daily Note     Today's date: 2019  Patient name: Melba Velasquez  : 1955  MRN: [id number]  Referring provider: Shital Salazar MD  Dx:   Encounter Diagnosis     ICD-10-CM    1  Adhesive capsulitis of right shoulder M75 01    2  Chronic right shoulder pain M25 511     G89 29                   Subjective: Pt reports she feels like the exercises are helping  Noticing less frequent, as well as intense flare ups  Still gets sx/flare ups but able to do HEP to help reduce  Anxious to continue making progress  Objective: See treatment diary below      Assessment: Tolerated treatment well  Patient demonstrated fatigue post treatment, exhibited good technique with therapeutic exercises and would benefit from continued PT      Plan: Continue per plan of care  Progress treatment as tolerated         Precautions: None noted at this time    Daily Treatment Diary     HEP: Sheet provided and discussed    Manual  19            ART x10'            IASTM             JM Prone P to A grade I, II JM x5'            Stretch             Triggerpoint                 Exercise Diary  19            Prone on elbows x10'                         Prone Press Ups 3x10            Front Wall Glides             Lateral Wall Glides             LTR             SKTC             DKTC             PB fwd trunk flx             Prone hip ext 3x10 ea            Prone hip IR with TB 3x10 L2            Prone hip ER with Ball 3x10            Prone Hip flexor stretch              No Shoe AE Steamboats             Lateral walking              QKB             Cat Backs             On elbows hip ext                                           Modalities  19            MHP             CP Prone x10'            STIM

## 2019-08-29 ENCOUNTER — OFFICE VISIT (OUTPATIENT)
Dept: PHYSICAL THERAPY | Facility: CLINIC | Age: 64
End: 2019-08-29
Payer: MEDICARE

## 2019-08-29 DIAGNOSIS — M54.41 CHRONIC BILATERAL LOW BACK PAIN WITH BILATERAL SCIATICA: Primary | ICD-10-CM

## 2019-08-29 DIAGNOSIS — M54.42 CHRONIC BILATERAL LOW BACK PAIN WITH BILATERAL SCIATICA: Primary | ICD-10-CM

## 2019-08-29 DIAGNOSIS — G89.29 CHRONIC BILATERAL LOW BACK PAIN WITH BILATERAL SCIATICA: Primary | ICD-10-CM

## 2019-08-29 PROCEDURE — 97112 NEUROMUSCULAR REEDUCATION: CPT | Performed by: PHYSICAL THERAPIST

## 2019-08-29 PROCEDURE — 97110 THERAPEUTIC EXERCISES: CPT | Performed by: PHYSICAL THERAPIST

## 2019-08-29 PROCEDURE — 97140 MANUAL THERAPY 1/> REGIONS: CPT | Performed by: PHYSICAL THERAPIST

## 2019-08-29 NOTE — PROGRESS NOTES
Daily Note     Today's date: 2019  Patient name: Medford Rinne  : 1955  MRN: [id number]  Referring provider: Petty Germain MD  Dx:   Encounter Diagnosis     ICD-10-CM    1  Adhesive capsulitis of right shoulder M75 01    2  Chronic right shoulder pain M25 511     G89 29                   Subjective: Pt reports her sx are up and down  Still gets sx/flare ups but able to do HEP to help reduce  Anxious to continue making progress  Objective: See treatment diary below      Assessment: Tolerated treatment well  Patient demonstrated fatigue post treatment, exhibited good technique with therapeutic exercises and would benefit from continued PT      Plan: Continue per plan of care  Progress treatment as tolerated         Precautions: None noted at this time    Daily Treatment Diary     HEP: Sheet provided and discussed    Manual  19            ART x10'            IASTM             JM Prone P to A grade I, II JM x5'            Stretch             Triggerpoint                 Exercise Diary  19            Prone on elbows x10'                         Prone Press Ups 3x10            Front Wall Glides             Lateral Wall Glides             LTR             SKTC             DKTC             PB fwd trunk flx             Prone hip ext 3x10 ea            Prone hip IR with TB 3x10 L2            Prone hip ER with Ball 3x10            Prone Hip flexor stretch              No Shoe AE Steamboats             Lateral walking              QKB             Cat Backs             On elbows hip ext                                           Modalities  19            MHP             CP Prone x10'            STIM

## 2019-09-03 ENCOUNTER — OFFICE VISIT (OUTPATIENT)
Dept: PHYSICAL THERAPY | Facility: CLINIC | Age: 64
End: 2019-09-03
Payer: MEDICARE

## 2019-09-03 ENCOUNTER — APPOINTMENT (OUTPATIENT)
Dept: PHYSICAL THERAPY | Facility: CLINIC | Age: 64
End: 2019-09-03
Payer: MEDICARE

## 2019-09-03 DIAGNOSIS — M54.42 CHRONIC BILATERAL LOW BACK PAIN WITH BILATERAL SCIATICA: Primary | ICD-10-CM

## 2019-09-03 DIAGNOSIS — M54.41 CHRONIC BILATERAL LOW BACK PAIN WITH BILATERAL SCIATICA: Primary | ICD-10-CM

## 2019-09-03 DIAGNOSIS — G89.29 CHRONIC BILATERAL LOW BACK PAIN WITH BILATERAL SCIATICA: Primary | ICD-10-CM

## 2019-09-03 PROCEDURE — 97110 THERAPEUTIC EXERCISES: CPT | Performed by: PHYSICAL THERAPIST

## 2019-09-03 PROCEDURE — 97140 MANUAL THERAPY 1/> REGIONS: CPT | Performed by: PHYSICAL THERAPIST

## 2019-09-03 PROCEDURE — 97112 NEUROMUSCULAR REEDUCATION: CPT | Performed by: PHYSICAL THERAPIST

## 2019-09-03 NOTE — PROGRESS NOTES
Daily Note     Today's date: 9/3/2019  Patient name: Lemuel Morley  : 1955  MRN: [id number]  Referring provider: Angus Hearn MD  Dx:   Encounter Diagnosis     ICD-10-CM    1  Adhesive capsulitis of right shoulder M75 01    2  Chronic right shoulder pain M25 511     G89 29                   Subjective: Pt reports her sx continue to remain up and down  Still gets sx/flare ups but able to do HEP to help reduce  Anxious to continue making progress  Objective: See treatment diary below      Assessment: Tolerated treatment well  Patient demonstrated fatigue post treatment, exhibited good technique with therapeutic exercises and would benefit from continued PT      Plan: Continue per plan of care  Progress treatment as tolerated         Precautions: None noted at this time    Daily Treatment Diary     HEP: Sheet provided and discussed    Manual  9/3/19            ART x10'            IASTM             JM Prone P to A grade I, II JM x5'            Stretch             Triggerpoint                 Exercise Diary  9/3/19            Prone on elbows x10'                         Prone Press Ups 3x10            Front Wall Glides             Lateral Wall Glides             LTR             SKTC             DKTC             PB fwd trunk flx             Prone hip ext 3x10 ea            Prone hip IR with TB 3x10 L2            Prone hip ER with Ball 3x10            Prone Hip flexor stretch              No Shoe AE Steamboats             Lateral walking              QKB             Cat Backs             On elbows hip ext                                           Modalities  9/3/19            MHP             CP Prone x10'            STIM

## 2019-09-05 ENCOUNTER — OFFICE VISIT (OUTPATIENT)
Dept: PHYSICAL THERAPY | Facility: CLINIC | Age: 64
End: 2019-09-05
Payer: MEDICARE

## 2019-09-05 ENCOUNTER — APPOINTMENT (OUTPATIENT)
Dept: PHYSICAL THERAPY | Facility: CLINIC | Age: 64
End: 2019-09-05
Payer: MEDICARE

## 2019-09-05 DIAGNOSIS — M54.42 CHRONIC BILATERAL LOW BACK PAIN WITH BILATERAL SCIATICA: Primary | ICD-10-CM

## 2019-09-05 DIAGNOSIS — M54.41 CHRONIC BILATERAL LOW BACK PAIN WITH BILATERAL SCIATICA: Primary | ICD-10-CM

## 2019-09-05 DIAGNOSIS — G89.29 CHRONIC BILATERAL LOW BACK PAIN WITH BILATERAL SCIATICA: Primary | ICD-10-CM

## 2019-09-05 PROCEDURE — 97112 NEUROMUSCULAR REEDUCATION: CPT | Performed by: PHYSICAL THERAPIST

## 2019-09-05 PROCEDURE — 97140 MANUAL THERAPY 1/> REGIONS: CPT | Performed by: PHYSICAL THERAPIST

## 2019-09-05 PROCEDURE — 97110 THERAPEUTIC EXERCISES: CPT | Performed by: PHYSICAL THERAPIST

## 2019-09-05 NOTE — PROGRESS NOTES
Daily Note     Today's date: 2019  Patient name: Jimbo Trinidad  : 1955  MRN: [id number]  Referring provider: Mery Lopez MD  Dx:   Encounter Diagnosis     ICD-10-CM    1  Adhesive capsulitis of right shoulder M75 01    2  Chronic right shoulder pain M25 511     G89 29                   Subjective: Pt reports she continues to complete HEP consistently  Feels the exercises help  Hoping to start seeing an increase in length of carryover of relief of sx  Objective: See treatment diary below      Assessment: Tolerated treatment well  Patient demonstrated fatigue post treatment, exhibited good technique with therapeutic exercises and would benefit from continued PT      Plan: Continue per plan of care  Progress treatment as tolerated         Precautions: None noted at this time    Daily Treatment Diary     HEP: Sheet provided and discussed    Manual  19            ART x15'            IASTM             JM Prone P to A grade I, II JM x5'            Stretch             Triggerpoint                 Exercise Diary  19            Prone on elbows x10'                         Prone Press Ups 3x10            Front Wall Glides             Lateral Wall Glides             LTR             SKTC             DKTC             PB fwd trunk flx             Prone hip ext 3x10 ea            Prone hip IR with TB 3x10 L2            Prone hip ER with Ball 3x10            Prone Hip flexor stretch              No Shoe AE Steamboats             Lateral walking              QKB             Cat Backs             On elbows hip ext                                           Modalities  19            MHP             CP Prone x10'            STIM

## 2019-09-10 ENCOUNTER — OFFICE VISIT (OUTPATIENT)
Dept: PHYSICAL THERAPY | Facility: CLINIC | Age: 64
End: 2019-09-10
Payer: MEDICARE

## 2019-09-10 DIAGNOSIS — M54.41 CHRONIC BILATERAL LOW BACK PAIN WITH BILATERAL SCIATICA: Primary | ICD-10-CM

## 2019-09-10 DIAGNOSIS — G89.29 CHRONIC BILATERAL LOW BACK PAIN WITH BILATERAL SCIATICA: Primary | ICD-10-CM

## 2019-09-10 DIAGNOSIS — M54.42 CHRONIC BILATERAL LOW BACK PAIN WITH BILATERAL SCIATICA: Primary | ICD-10-CM

## 2019-09-10 PROCEDURE — 97140 MANUAL THERAPY 1/> REGIONS: CPT

## 2019-09-10 PROCEDURE — 97110 THERAPEUTIC EXERCISES: CPT

## 2019-09-10 NOTE — PROGRESS NOTES
Daily Note     Today's date: 9/10/2019  Patient name: Mendoza Maier  : 1955  MRN: [id number]  Referring provider: Nora Mantilla MD  Dx:   Encounter Diagnosis     ICD-10-CM    1  Chronic bilateral low back pain with bilateral sciatica M54 42     M54 41     G89 29                   Subjective: Pt reports overall improvement in sx  States no pain today  Objective: See treatment diary below      Assessment: Tolerated treatment well  Patient exhibited good technique with therapeutic exercises  Pt han program well without pain today  Pt making gains with overall improved sx and strength  Plan: Continue per plan of care        Precautions: None noted at this time    Daily Treatment Diary     HEP: Sheet provided and discussed    Manual  9/5/19 9/10/19           ART x15'            IASTM             JM Prone P to A grade I, II JM x5'            Stretch  15'           Triggerpoint                 Exercise Diary  9/5/19 9/10/19           Prone on elbows x10' 10'                        Prone Press Ups 3x10 3/10           Front Wall Glides             Lateral Wall Glides             LTR             SKTC             DKTC             PB fwd trunk flx             Prone hip ext 3x10 ea 3/10           Prone hip IR with TB 3x10 L2 3/10 L3           Prone hip ER with Ball 3x10 3/10           Prone Hip flexor stretch              No Shoe AE Steamboats             Lateral walking              QKB             Cat Backs  2/10           On elbows hip ext                                           Modalities  9/5/19 9/10/19           MHP             CP Prone x10' 10'           STIM

## 2019-09-12 ENCOUNTER — OFFICE VISIT (OUTPATIENT)
Dept: PHYSICAL THERAPY | Facility: CLINIC | Age: 64
End: 2019-09-12
Payer: MEDICARE

## 2019-09-12 DIAGNOSIS — M54.42 CHRONIC BILATERAL LOW BACK PAIN WITH BILATERAL SCIATICA: Primary | ICD-10-CM

## 2019-09-12 DIAGNOSIS — M25.511 CHRONIC RIGHT SHOULDER PAIN: ICD-10-CM

## 2019-09-12 DIAGNOSIS — G89.29 CHRONIC RIGHT SHOULDER PAIN: ICD-10-CM

## 2019-09-12 DIAGNOSIS — G89.29 CHRONIC BILATERAL LOW BACK PAIN WITH BILATERAL SCIATICA: Primary | ICD-10-CM

## 2019-09-12 DIAGNOSIS — M54.41 CHRONIC BILATERAL LOW BACK PAIN WITH BILATERAL SCIATICA: Primary | ICD-10-CM

## 2019-09-12 DIAGNOSIS — M75.01 ADHESIVE CAPSULITIS OF RIGHT SHOULDER: ICD-10-CM

## 2019-09-12 PROCEDURE — 97112 NEUROMUSCULAR REEDUCATION: CPT | Performed by: PHYSICAL THERAPIST

## 2019-09-12 PROCEDURE — 97110 THERAPEUTIC EXERCISES: CPT | Performed by: PHYSICAL THERAPIST

## 2019-09-12 PROCEDURE — 97140 MANUAL THERAPY 1/> REGIONS: CPT | Performed by: PHYSICAL THERAPIST

## 2019-09-12 NOTE — PROGRESS NOTES
Daily Note     Today's date: 2019  Patient name: Brynn Reilly  : 1955  MRN: [id number]  Referring provider: Tavo Santillan MD  Dx:   Encounter Diagnosis     ICD-10-CM    1  Chronic bilateral low back pain with bilateral sciatica M54 42     M54 41     G89 29    2  Adhesive capsulitis of right shoulder M75 01    3  Chronic right shoulder pain M25 511     G89 29                   Subjective: Pt reports she feels she is getting better in regards to pain intensity and frequency  Pt reports HEP continues to go well  Anxious to continue progressing  Objective: See treatment diary below      Assessment: Tolerated treatment well  Patient demonstrated fatigue post treatment, exhibited good technique with therapeutic exercises and would benefit from continued PT      Plan: Continue per plan of care  Progress treatment as tolerated         Precautions: None noted at this time    Daily Treatment Diary     HEP: Sheet provided and discussed    Manual  19            ART x15'            IASTM             JM Prone P to A grade I, II JM x5'            Stretch             Triggerpoint                 Exercise Diary  19            Prone on elbows x20'                         Prone Press Ups 3x10            Front Wall Glides             Lateral Wall Glides             LTR             SKTC             DKTC             PB fwd trunk flx             Prone hip ext 3x10 ea            Prone hip IR with TB 3x10 L2            Prone hip ER with Ball 3x10            Prone Hip flexor stretch              No Shoe AE Steamboats             Lateral walking              QKB             Cat Backs 2x10            On elbows hip ext                                           Modalities  19            MHP             CP Prone x10'            STIM

## 2019-09-17 ENCOUNTER — OFFICE VISIT (OUTPATIENT)
Dept: PHYSICAL THERAPY | Facility: CLINIC | Age: 64
End: 2019-09-17
Payer: MEDICARE

## 2019-09-17 ENCOUNTER — TRANSCRIBE ORDERS (OUTPATIENT)
Dept: PHYSICAL THERAPY | Facility: CLINIC | Age: 64
End: 2019-09-17

## 2019-09-17 DIAGNOSIS — M54.41 CHRONIC BILATERAL LOW BACK PAIN WITH BILATERAL SCIATICA: Primary | ICD-10-CM

## 2019-09-17 DIAGNOSIS — G89.29 CHRONIC RIGHT SHOULDER PAIN: ICD-10-CM

## 2019-09-17 DIAGNOSIS — G89.29 CHRONIC BILATERAL LOW BACK PAIN WITH BILATERAL SCIATICA: Primary | ICD-10-CM

## 2019-09-17 DIAGNOSIS — M54.42 ACUTE BACK PAIN WITH SCIATICA, LEFT: Primary | ICD-10-CM

## 2019-09-17 DIAGNOSIS — M25.511 CHRONIC RIGHT SHOULDER PAIN: ICD-10-CM

## 2019-09-17 DIAGNOSIS — M54.42 CHRONIC BILATERAL LOW BACK PAIN WITH BILATERAL SCIATICA: Primary | ICD-10-CM

## 2019-09-17 DIAGNOSIS — M75.01 ADHESIVE CAPSULITIS OF RIGHT SHOULDER: ICD-10-CM

## 2019-09-17 PROCEDURE — 97140 MANUAL THERAPY 1/> REGIONS: CPT | Performed by: PHYSICAL THERAPIST

## 2019-09-17 PROCEDURE — 97110 THERAPEUTIC EXERCISES: CPT | Performed by: PHYSICAL THERAPIST

## 2019-09-17 PROCEDURE — 97112 NEUROMUSCULAR REEDUCATION: CPT | Performed by: PHYSICAL THERAPIST

## 2019-09-17 NOTE — PROGRESS NOTES
PT Re-Evaluation     Today's date: 2019  Patient name: Jose Virk  : 1955  MRN: [id number]  Referring provider: Idalmis Jefferson MD  Dx:   Encounter Diagnosis     ICD-10-CM    1  Chronic bilateral low back pain with bilateral sciatica M54 42     M54 41     G89 29    2  Adhesive capsulitis of right shoulder M75 01    3  Chronic right shoulder pain M25 511     G89 29                   Assessment  Understanding of Dx/Px/POC: good   Prognosis: good    Goals  STGs: To be complete within 4 weeks  - Decrease/centralize pain to < 2/10 at worst (partially met)  - Increase lumbar extension ROM to WNL (partially met)  - Increase bilateral hip flexor flexibility to WNL (partially met)  - Increase core stability and posterior lateral LE chain strength to > 5/5 (partially met)  - Improve postural awareness capacity to > 60min before deficit (partially met)    LTGs: To be complete within 6 weeks  - Able to sit for any extended amount of time without pain or limitation for increased safety and functional capacity with ADLs and home-related duty (partially met)  - Able to repetitively bend over at trunk without pain or limitation for increased safety and functional capacity with ADLs and and home-related duty (partially met)  - Able to complete all lifting/carrying activity without pain or limitation for increased safety and functional capacity with ADLs and whome-related duty (partially met)  - Able to complete transfers repetitively without pain or limitation for increased safety and functional capacity with ADLs and home-related duty (partially met)    Plan  Frequency: 2x week  Duration in weeks: 4       Upon completion of today's  Re-evaluation, as evidenced by present objective and subjective measures, Zulema's sx remain consistent with continued, well-paced progress from posterior lumbar derangement secondary to chronic postural dysfunction and improper functional movement mechanics   Pt will benefit from continued skilled physical therapy to address current deficits  Subjective Evaluation    Pain  Current pain ratin  At best pain ratin  At worst pain ratin  Location: LBP with intermittent bilateral LE radicular sx         Pt reports she feels about 75% improved since starting physical therapy  Reports pain intensity and frequency are down, as well bouts of radicular sx have significantly reduced  Pt reports she continues to benefit from physical therapy at this time and anxious to continue making progress          Objective Pain level ranges 0-4/10  AROM: Lumbar extension 25% decreased, bilateral hip extension and internal rotation 25% decreased  Strength: Core stability and post/lat LE chain strength 4/5  Postural Awareness: Poor (rounded shoulders, forward head)  Repeated movement testing: (+) repeated movement testing for post lumbar derangement  Hip flexor flexibility: Mild deficits (+) Bruce Test  Able sit 75% of the time without pain and limitation   Able to bend over at trunk 75% of the time without pain and limitation  Able to complete lifting/carrying activity 75% of the time without pain and limitation  Able to complete transfers 75% of the time without pain and limitation           Precautions: None noted at this time    Daily Treatment Diary     HEP: Sheet provided and discussed    Manual  19            ART x15'            IASTM             JM Prone P to A grade I, II JM x5'            Stretch             Triggerpoint                 Exercise Diary  19            Prone on elbows x20'                         Prone Press Ups 3x10            Front Wall Glides             Lateral Wall Glides             LTR             SKTC             DKTC             PB fwd trunk flx             Prone hip ext 3x10 ea            Prone hip IR with TB 3x10 L2            Prone hip ER with Ball 3x10            Prone Hip flexor stretch              No Shoe AE Steamboats             Lateral walking QKB             Cat Backs 2x10            On elbows hip ext                                           Modalities  9/17/19            MHP             CP Prone x10'            STIM

## 2019-09-17 NOTE — LETTER
2019    Redmond Caul, MD [address] [facility] [place]    Patient: Mendoza Bain   YOB: 1955   Date of Visit: 2019     Encounter Diagnosis     ICD-10-CM    1  Chronic bilateral low back pain with bilateral sciatica M54 42     M54 41     G89 29    2  Adhesive capsulitis of right shoulder M75 01    3  Chronic right shoulder pain M25 511     G89 29        Dear Dr Adrien Salcedo: Thank you for your recent referral of Mendoza Bain  Please review the attached evaluation summary from Zulema's recent visit  Please verify that you agree with the plan of care by signing the attached order  If you have any questions or concerns, please do not hesitate to call  I sincerely appreciate the opportunity to share in the care of one of your patients and hope to have another opportunity to work with you in the near future  Sincerely,    Leon Estevez, PT, DPT, ATC, ART      Referring Provider:      I certify that I have read the below Plan of Care and certify the need for these services furnished under this plan of treatment while under my care  Redmond Caul, [address] [facility]: [phone]          PT Re-Evaluation     Today's date: 2019  Patient name: Mendoza Bain  : 1955  MRN: [id number]  Referring provider: Marc Granados MD  Dx:   Encounter Diagnosis     ICD-10-CM    1  Chronic bilateral low back pain with bilateral sciatica M54 42     M54 41     G89 29    2  Adhesive capsulitis of right shoulder M75 01    3   Chronic right shoulder pain M25 511     G89 29                   Assessment  Understanding of Dx/Px/POC: good   Prognosis: good    Goals  STGs: To be complete within 4 weeks  - Decrease/centralize pain to < 2/10 at worst (partially met)  - Increase lumbar extension ROM to WNL (partially met)  - Increase bilateral hip flexor flexibility to WNL (partially met)  - Increase core stability and posterior lateral LE chain strength to > 5/5 (partially met)  - Improve postural awareness capacity to > 60min before deficit (partially met)    LTGs: To be complete within 6 weeks  - Able to sit for any extended amount of time without pain or limitation for increased safety and functional capacity with ADLs and home-related duty (partially met)  - Able to repetitively bend over at trunk without pain or limitation for increased safety and functional capacity with ADLs and and home-related duty (partially met)  - Able to complete all lifting/carrying activity without pain or limitation for increased safety and functional capacity with ADLs and whome-related duty (partially met)  - Able to complete transfers repetitively without pain or limitation for increased safety and functional capacity with ADLs and home-related duty (partially met)    Plan  Frequency: 2x week  Duration in weeks: 4       Upon completion of today's  Re-evaluation, as evidenced by present objective and subjective measures, Yanns sx remain consistent with continued, well-paced progress from posterior lumbar derangement secondary to chronic postural dysfunction and improper functional movement mechanics  Pt will benefit from continued skilled physical therapy to address current deficits  Subjective Evaluation    Pain  Current pain ratin  At best pain ratin  At worst pain ratin  Location: LBP with intermittent bilateral LE radicular sx         Pt reports she feels about 75% improved since starting physical therapy  Reports pain intensity and frequency are down, as well bouts of radicular sx have significantly reduced  Pt reports she continues to benefit from physical therapy at this time and anxious to continue making progress          Objective Pain level ranges 0-4/10  AROM: Lumbar extension 25% decreased, bilateral hip extension and internal rotation 25% decreased  Strength: Core stability and post/lat LE chain strength 4/5  Postural Awareness: Poor (rounded shoulders, forward head)  Repeated movement testing: (+) repeated movement testing for post lumbar derangement  Hip flexor flexibility: Mild deficits (+) Bruce Test  Able sit 75% of the time without pain and limitation   Able to bend over at trunk 75% of the time without pain and limitation  Able to complete lifting/carrying activity 75% of the time without pain and limitation  Able to complete transfers 75% of the time without pain and limitation           Precautions: None noted at this time    Daily Treatment Diary     HEP: Sheet provided and discussed    Manual  9/17/19            ART x15'            IASTM             JM Prone P to A grade I, II JM x5'            Stretch             Triggerpoint                 Exercise Diary  9/17/19            Prone on elbows x20'                         Prone Press Ups 3x10            Front Wall Glides             Lateral Wall Glides             LTR             SKTC             DKTC             PB fwd trunk flx             Prone hip ext 3x10 ea            Prone hip IR with TB 3x10 L2            Prone hip ER with Ball 3x10            Prone Hip flexor stretch              No Shoe AE Steamboats             Lateral walking              QKB             Cat Backs 2x10            On elbows hip ext                                           Modalities  9/17/19            MHP             CP Prone x10'            STIM

## 2019-09-19 ENCOUNTER — OFFICE VISIT (OUTPATIENT)
Dept: PHYSICAL THERAPY | Facility: CLINIC | Age: 64
End: 2019-09-19
Payer: MEDICARE

## 2019-09-19 DIAGNOSIS — M54.41 CHRONIC BILATERAL LOW BACK PAIN WITH BILATERAL SCIATICA: Primary | ICD-10-CM

## 2019-09-19 DIAGNOSIS — M25.511 CHRONIC RIGHT SHOULDER PAIN: ICD-10-CM

## 2019-09-19 DIAGNOSIS — G89.29 CHRONIC BILATERAL LOW BACK PAIN WITH BILATERAL SCIATICA: Primary | ICD-10-CM

## 2019-09-19 DIAGNOSIS — M75.01 ADHESIVE CAPSULITIS OF RIGHT SHOULDER: ICD-10-CM

## 2019-09-19 DIAGNOSIS — M54.42 CHRONIC BILATERAL LOW BACK PAIN WITH BILATERAL SCIATICA: Primary | ICD-10-CM

## 2019-09-19 DIAGNOSIS — G89.29 CHRONIC RIGHT SHOULDER PAIN: ICD-10-CM

## 2019-09-19 PROCEDURE — 97112 NEUROMUSCULAR REEDUCATION: CPT | Performed by: PHYSICAL THERAPIST

## 2019-09-19 PROCEDURE — 97140 MANUAL THERAPY 1/> REGIONS: CPT | Performed by: PHYSICAL THERAPIST

## 2019-09-19 PROCEDURE — 97110 THERAPEUTIC EXERCISES: CPT | Performed by: PHYSICAL THERAPIST

## 2019-09-19 NOTE — PROGRESS NOTES
Daily Note     Today's date: 2019  Patient name: Maddy Garsia  : 1955  MRN: [id number]  Referring provider: Deysi Araiza MD  Dx:   Encounter Diagnosis     ICD-10-CM    1  Chronic bilateral low back pain with bilateral sciatica M54 42     M54 41     G89 29    2  Adhesive capsulitis of right shoulder M75 01    3  Chronic right shoulder pain M25 511     G89 29                   Subjective: Pt reports she continues to feel improvement with each day  Exercises continue to help at each session and HEP continues to go well  Anxious to continue making progress  Objective: See treatment diary below      Assessment: Tolerated treatment well  Patient demonstrated fatigue post treatment, exhibited good technique with therapeutic exercises and would benefit from continued PT      Plan: Continue per plan of care  Progress treatment as tolerated         Precautions: None noted at this time    Daily Treatment Diary     HEP: Sheet provided and discussed    Manual  19            ART x15'            IASTM             JM Prone P to A grade I, II JM x5'            Stretch             Triggerpoint                 Exercise Diary  19            Prone on elbows x20'                         Prone Press Ups 3x10            Front Wall Glides             Lateral Wall Glides             LTR             SKTC             DKTC             PB fwd trunk flx             Prone hip ext 3x10 ea            Prone hip IR with TB 3x10 L2            Prone hip ER with Ball 3x10            Prone Hip flexor stretch              No Shoe AE Steamboats             Lateral walking              QKB             Cat Backs 2x10            On elbows hip ext                                           Modalities  19            MHP             CP Prone x10'            STIM

## 2019-09-24 ENCOUNTER — OFFICE VISIT (OUTPATIENT)
Dept: PHYSICAL THERAPY | Facility: CLINIC | Age: 64
End: 2019-09-24
Payer: MEDICARE

## 2019-09-24 DIAGNOSIS — M25.511 CHRONIC RIGHT SHOULDER PAIN: ICD-10-CM

## 2019-09-24 DIAGNOSIS — G89.29 CHRONIC BILATERAL LOW BACK PAIN WITH BILATERAL SCIATICA: Primary | ICD-10-CM

## 2019-09-24 DIAGNOSIS — G89.29 CHRONIC RIGHT SHOULDER PAIN: ICD-10-CM

## 2019-09-24 DIAGNOSIS — M54.41 CHRONIC BILATERAL LOW BACK PAIN WITH BILATERAL SCIATICA: Primary | ICD-10-CM

## 2019-09-24 DIAGNOSIS — M75.01 ADHESIVE CAPSULITIS OF RIGHT SHOULDER: ICD-10-CM

## 2019-09-24 DIAGNOSIS — M54.42 CHRONIC BILATERAL LOW BACK PAIN WITH BILATERAL SCIATICA: Primary | ICD-10-CM

## 2019-09-24 PROCEDURE — 97140 MANUAL THERAPY 1/> REGIONS: CPT | Performed by: PHYSICAL THERAPIST

## 2019-09-24 PROCEDURE — 97110 THERAPEUTIC EXERCISES: CPT | Performed by: PHYSICAL THERAPIST

## 2019-09-24 PROCEDURE — 97112 NEUROMUSCULAR REEDUCATION: CPT | Performed by: PHYSICAL THERAPIST

## 2019-09-24 NOTE — PROGRESS NOTES
Daily Note     Today's date: 2019  Patient name: Jose Virk  : 1955  MRN: [id number]  Referring provider: Idalmis Jefferson MD  Dx:   Encounter Diagnosis     ICD-10-CM    1  Chronic bilateral low back pain with bilateral sciatica M54 42     M54 41     G89 29    2  Adhesive capsulitis of right shoulder M75 01    3  Chronic right shoulder pain M25 511     G89 29                   Subjective: Pt reports no setbacks  Continues to improve  HEP going well  Anxious to continue making progress  Objective: See treatment diary below      Assessment: Tolerated treatment well  Patient demonstrated fatigue post treatment, exhibited good technique with therapeutic exercises and would benefit from continued PT      Plan: Continue per plan of care  Progress treatment as tolerated         Precautions: None noted at this time    Daily Treatment Diary     HEP: Sheet provided and discussed    Manual  19            ART x15'            IASTM             JM Prone P to A grade I, II JM x5'            Stretch             Triggerpoint                 Exercise Diary  19            Prone on elbows x20'                         Prone Press Ups 3x10            Front Wall Glides             Lateral Wall Glides             LTR             SKTC             DKTC             PB fwd trunk flx             Prone hip ext 3x10 ea            Prone hip IR with TB 3x10 L2            Prone hip ER with Ball 3x10            Prone Hip flexor stretch              No Shoe AE Steamboats             Lateral walking              QKB             Cat Backs 2x10            On elbows hip ext                                           Modalities  19            MHP             CP Prone x10'            STIM

## 2019-09-26 ENCOUNTER — OFFICE VISIT (OUTPATIENT)
Dept: PHYSICAL THERAPY | Facility: CLINIC | Age: 64
End: 2019-09-26
Payer: MEDICARE

## 2019-09-26 DIAGNOSIS — M54.41 CHRONIC BILATERAL LOW BACK PAIN WITH BILATERAL SCIATICA: Primary | ICD-10-CM

## 2019-09-26 DIAGNOSIS — M54.42 CHRONIC BILATERAL LOW BACK PAIN WITH BILATERAL SCIATICA: Primary | ICD-10-CM

## 2019-09-26 DIAGNOSIS — M75.01 ADHESIVE CAPSULITIS OF RIGHT SHOULDER: ICD-10-CM

## 2019-09-26 DIAGNOSIS — G89.29 CHRONIC BILATERAL LOW BACK PAIN WITH BILATERAL SCIATICA: Primary | ICD-10-CM

## 2019-09-26 PROCEDURE — 97112 NEUROMUSCULAR REEDUCATION: CPT | Performed by: PHYSICAL THERAPIST

## 2019-09-26 PROCEDURE — 97140 MANUAL THERAPY 1/> REGIONS: CPT | Performed by: PHYSICAL THERAPIST

## 2019-09-26 PROCEDURE — 97110 THERAPEUTIC EXERCISES: CPT | Performed by: PHYSICAL THERAPIST

## 2019-09-26 NOTE — PROGRESS NOTES
Daily Note     Today's date: 2019  Patient name: Geo Minor  : 1955  MRN: [id number]  Referring provider: Bo Curiel MD  Dx:   Encounter Diagnosis     ICD-10-CM    1  Chronic bilateral low back pain with bilateral sciatica M54 42     M54 41     G89 29    2  Adhesive capsulitis of right shoulder M75 01    3  Chronic right shoulder pain M25 511     G89 29                   Subjective: Pt reports MD follow up 10/10/19  Would like to carry PT until then as she continues to see benefit and improvements  Reports HEP continues to go well and that ultimate goal is to safely transition to HEP  Anxious to continue making progress  Objective: See treatment diary below      Assessment: Tolerated treatment well  Patient demonstrated fatigue post treatment, exhibited good technique with therapeutic exercises and would benefit from continued PT      Plan: Continue per plan of care  Progress treatment as tolerated         Precautions: None noted at this time    Daily Treatment Diary     HEP: Sheet provided and discussed    Manual  19            ART x15'            IASTM             JM Prone P to A grade I, II JM x5'            Stretch             Triggerpoint                 Exercise Diary  19            Prone on elbows x20'                         Prone Press Ups 3x10            Front Wall Glides             Lateral Wall Glides             LTR             SKTC             DKTC             PB fwd trunk flx             Prone hip ext 3x10 ea            Prone hip IR with TB 3x10 L2            Prone hip ER with Ball 3x10            Prone Hip flexor stretch              No Shoe AE Steamboats             Lateral walking              QKB             Cat Backs 2x10            On elbows hip ext                                           Modalities  19            MHP             CP Prone x10'            STIM

## 2019-10-01 ENCOUNTER — OFFICE VISIT (OUTPATIENT)
Dept: PHYSICAL THERAPY | Facility: CLINIC | Age: 64
End: 2019-10-01
Payer: MEDICARE

## 2019-10-01 DIAGNOSIS — M25.511 CHRONIC RIGHT SHOULDER PAIN: ICD-10-CM

## 2019-10-01 DIAGNOSIS — M54.41 CHRONIC BILATERAL LOW BACK PAIN WITH BILATERAL SCIATICA: Primary | ICD-10-CM

## 2019-10-01 DIAGNOSIS — M54.42 CHRONIC BILATERAL LOW BACK PAIN WITH BILATERAL SCIATICA: Primary | ICD-10-CM

## 2019-10-01 DIAGNOSIS — G89.29 CHRONIC BILATERAL LOW BACK PAIN WITH BILATERAL SCIATICA: Primary | ICD-10-CM

## 2019-10-01 DIAGNOSIS — M75.01 ADHESIVE CAPSULITIS OF RIGHT SHOULDER: ICD-10-CM

## 2019-10-01 DIAGNOSIS — G89.29 CHRONIC RIGHT SHOULDER PAIN: ICD-10-CM

## 2019-10-01 PROCEDURE — 97110 THERAPEUTIC EXERCISES: CPT | Performed by: PHYSICAL THERAPIST

## 2019-10-01 PROCEDURE — 97112 NEUROMUSCULAR REEDUCATION: CPT | Performed by: PHYSICAL THERAPIST

## 2019-10-01 PROCEDURE — 97140 MANUAL THERAPY 1/> REGIONS: CPT | Performed by: PHYSICAL THERAPIST

## 2019-10-03 ENCOUNTER — OFFICE VISIT (OUTPATIENT)
Dept: PHYSICAL THERAPY | Facility: CLINIC | Age: 64
End: 2019-10-03
Payer: MEDICARE

## 2019-10-03 DIAGNOSIS — M25.511 CHRONIC RIGHT SHOULDER PAIN: ICD-10-CM

## 2019-10-03 DIAGNOSIS — M54.41 CHRONIC BILATERAL LOW BACK PAIN WITH BILATERAL SCIATICA: Primary | ICD-10-CM

## 2019-10-03 DIAGNOSIS — M54.42 CHRONIC BILATERAL LOW BACK PAIN WITH BILATERAL SCIATICA: Primary | ICD-10-CM

## 2019-10-03 DIAGNOSIS — G89.29 CHRONIC BILATERAL LOW BACK PAIN WITH BILATERAL SCIATICA: Primary | ICD-10-CM

## 2019-10-03 DIAGNOSIS — G89.29 CHRONIC RIGHT SHOULDER PAIN: ICD-10-CM

## 2019-10-03 DIAGNOSIS — M75.01 ADHESIVE CAPSULITIS OF RIGHT SHOULDER: ICD-10-CM

## 2019-10-03 PROCEDURE — 97112 NEUROMUSCULAR REEDUCATION: CPT | Performed by: PHYSICAL THERAPIST

## 2019-10-03 PROCEDURE — 97140 MANUAL THERAPY 1/> REGIONS: CPT | Performed by: PHYSICAL THERAPIST

## 2019-10-03 PROCEDURE — 97110 THERAPEUTIC EXERCISES: CPT | Performed by: PHYSICAL THERAPIST

## 2019-10-03 NOTE — PROGRESS NOTES
Daily Note     Today's date: 10/3/2019  Patient name: Angelo Hebert  : 1955  MRN: [id number]  Referring provider: Domitila Carbajal MD  Dx:   Encounter Diagnosis     ICD-10-CM    1  Chronic bilateral low back pain with bilateral sciatica M54 42     M54 41     G89 29    2  Adhesive capsulitis of right shoulder M75 01    3  Chronic right shoulder pain M25 511     G89 29                   Subjective: Pt reports MD follow up 10/10/19  Would like to carry PT until then as she continues to see benefit and improvements  Reports HEP continues to go well and that ultimate goal is to safely transition to HEP  Anxious to continue making progress  Objective: See treatment diary below      Assessment: Tolerated treatment well  Patient demonstrated fatigue post treatment, exhibited good technique with therapeutic exercises and would benefit from continued PT      Plan: Continue per plan of care  Progress treatment as tolerated         Precautions: None noted at this time    Daily Treatment Diary     HEP: Sheet provided and discussed    Manual  10/3/19            ART x15'            IASTM             JM Prone P to A grade I, II JM x5'            Stretch             Triggerpoint                 Exercise Diary  10/3/19            Prone on elbows x20'                         Prone Press Ups 3x10            Front Wall Glides             Lateral Wall Glides             LTR             SKTC             DKTC             PB fwd trunk flx             Prone hip ext 3x10 ea            Prone hip IR with TB 3x10 L2            Prone hip ER with Ball 3x10            Prone Hip flexor stretch              No Shoe AE Steamboats             Lateral walking              QKB 3x5            Cat Backs 2x10            On elbows hip ext                                           Modalities  10/3/19            MHP             CP Prone x10'            STIM #10 Blade

## 2019-10-08 ENCOUNTER — OFFICE VISIT (OUTPATIENT)
Dept: PHYSICAL THERAPY | Facility: CLINIC | Age: 64
End: 2019-10-08
Payer: MEDICARE

## 2019-10-08 DIAGNOSIS — G89.29 CHRONIC RIGHT SHOULDER PAIN: ICD-10-CM

## 2019-10-08 DIAGNOSIS — M54.42 CHRONIC BILATERAL LOW BACK PAIN WITH BILATERAL SCIATICA: Primary | ICD-10-CM

## 2019-10-08 DIAGNOSIS — M25.511 CHRONIC RIGHT SHOULDER PAIN: ICD-10-CM

## 2019-10-08 DIAGNOSIS — G89.29 CHRONIC BILATERAL LOW BACK PAIN WITH BILATERAL SCIATICA: Primary | ICD-10-CM

## 2019-10-08 DIAGNOSIS — M75.01 ADHESIVE CAPSULITIS OF RIGHT SHOULDER: ICD-10-CM

## 2019-10-08 DIAGNOSIS — M54.41 CHRONIC BILATERAL LOW BACK PAIN WITH BILATERAL SCIATICA: Primary | ICD-10-CM

## 2019-10-08 PROCEDURE — 97112 NEUROMUSCULAR REEDUCATION: CPT | Performed by: PHYSICAL THERAPIST

## 2019-10-08 PROCEDURE — 97140 MANUAL THERAPY 1/> REGIONS: CPT | Performed by: PHYSICAL THERAPIST

## 2019-10-08 PROCEDURE — 97110 THERAPEUTIC EXERCISES: CPT | Performed by: PHYSICAL THERAPIST

## 2019-10-08 NOTE — PROGRESS NOTES
Daily Note     Today's date: 10/8/2019  Patient name: Lakshmi De La O  : 1955  MRN: [id number]  Referring provider: Zach Whitman MD  Dx:   Encounter Diagnosis     ICD-10-CM    1  Chronic bilateral low back pain with bilateral sciatica M54 42     M54 41     G89 29    2  Adhesive capsulitis of right shoulder M75 01    3  Chronic right shoulder pain M25 511     G89 29                   Subjective: Pt reports MD follow up 10/10/19  Would like to carry PT until then as she continues to see benefit and improvements  Reports HEP continues to go well and that ultimate goal is to safely transition to HEP  Anxious to continue making progress  Objective: See treatment diary below      Assessment: Tolerated treatment well  Patient demonstrated fatigue post treatment, exhibited good technique with therapeutic exercises and would benefit from continued PT      Plan: Continue per plan of care  Potential discharge next visit       Precautions: None noted at this time    Daily Treatment Diary     HEP: Sheet provided and discussed    Manual  10/8/19            ART x15'            IASTM             JM Prone P to A grade I, II JM x5'            Stretch             Triggerpoint                 Exercise Diary  10/8/19            Prone on elbows x20'                         Prone Press Ups 3x10            Front Wall Glides             Lateral Wall Glides             LTR             SKTC             DKTC             PB fwd trunk flx             Prone hip ext 3x10 ea            Prone hip IR with TB 3x10 L2            Prone hip ER with Ball 3x10            Prone Hip flexor stretch              No Shoe AE Steamboats             Lateral walking              QKB 3x5            Cat Backs 2x10            On elbows hip ext                                           Modalities  10/8/19            MHP             CP Prone x10'            STIM

## 2019-10-10 ENCOUNTER — OFFICE VISIT (OUTPATIENT)
Dept: PHYSICAL THERAPY | Facility: CLINIC | Age: 64
End: 2019-10-10
Payer: MEDICARE

## 2019-10-10 DIAGNOSIS — G89.29 CHRONIC BILATERAL LOW BACK PAIN WITH BILATERAL SCIATICA: Primary | ICD-10-CM

## 2019-10-10 DIAGNOSIS — M54.42 CHRONIC BILATERAL LOW BACK PAIN WITH BILATERAL SCIATICA: Primary | ICD-10-CM

## 2019-10-10 DIAGNOSIS — M54.41 CHRONIC BILATERAL LOW BACK PAIN WITH BILATERAL SCIATICA: Primary | ICD-10-CM

## 2019-10-10 PROCEDURE — 97140 MANUAL THERAPY 1/> REGIONS: CPT

## 2019-10-10 PROCEDURE — 97110 THERAPEUTIC EXERCISES: CPT

## 2019-10-10 NOTE — PROGRESS NOTES
Daily Note     Today's date: 10/10/2019  Patient name: Bolivar Bowens  : 1955  MRN: [id number]  Referring provider: Esther Ayala MD  Dx:   Encounter Diagnosis     ICD-10-CM    1  Chronic bilateral low back pain with bilateral sciatica M54 42     M54 41     G89 29                   Subjective: Pt reports she is doing well and will see MD today  Pt reports mild L sided LS pain  Objective: See treatment diary below      Assessment: Tolerated treatment well  Patient exhibited good technique with therapeutic exercises  Pt able to cont to complete program with overall improved sx  Pt had increased L LS pain with quad OA/OL  Pt han remainder of program well without pain  Plan: Continue per plan of care        Precautions: None noted at this time    Daily Treatment Diary     HEP: Sheet provided and discussed    Manual  10/8/19 10/10/19           ART x15'            IASTM             JM Prone P to A grade I, II JM x5'            Stretch  15'           Triggerpoint                 Exercise Diary  10/8/19 10/10/19           Prone on elbows x20' 10'                        Prone Press Ups 3x10 3/10           Front Wall Glides             Lateral Wall Glides             LTR             SKTC             DKTC             PB fwd trunk flx             Prone hip ext 3x10 ea 3/10           Prone hip IR with TB 3x10 L2 3/10 L2           Prone hip ER with Ball 3x10 3/10           Prone Hip flexor stretch              No Shoe AE Steamboats             Lateral walking              QKB 3x5 3/5           Cat Backs 2x10 2/10           On elbows hip ext                                           Modalities  10/8/19 10/10/19           MHP             CP Prone x10' 10' prone           STIM

## 2019-10-15 ENCOUNTER — TRANSCRIBE ORDERS (OUTPATIENT)
Dept: ADMINISTRATIVE | Facility: HOSPITAL | Age: 64
End: 2019-10-15

## 2019-10-15 DIAGNOSIS — Z12.31 SCREENING MAMMOGRAM FOR HIGH-RISK PATIENT: Primary | ICD-10-CM

## 2019-10-15 NOTE — PROGRESS NOTES
DISCHARGE SUMMARY    Pt will be D/C from physical therapy as her MD informed her it is safe to transition to HEP  Pt will reach out with any questions/concerns/setbacks

## 2019-10-17 ENCOUNTER — APPOINTMENT (OUTPATIENT)
Dept: PHYSICAL THERAPY | Facility: CLINIC | Age: 64
End: 2019-10-17
Payer: MEDICARE

## 2019-10-31 ENCOUNTER — OFFICE VISIT (OUTPATIENT)
Dept: FAMILY MEDICINE CLINIC | Facility: CLINIC | Age: 64
End: 2019-10-31
Payer: MEDICARE

## 2019-10-31 VITALS
SYSTOLIC BLOOD PRESSURE: 102 MMHG | WEIGHT: 138 LBS | HEIGHT: 68 IN | BODY MASS INDEX: 20.92 KG/M2 | DIASTOLIC BLOOD PRESSURE: 64 MMHG

## 2019-10-31 DIAGNOSIS — D86.9 SARCOIDOSIS: ICD-10-CM

## 2019-10-31 DIAGNOSIS — G43.709 CHRONIC MIGRAINE WITHOUT AURA WITHOUT STATUS MIGRAINOSUS, NOT INTRACTABLE: ICD-10-CM

## 2019-10-31 DIAGNOSIS — Z51.81 ENCOUNTER FOR MEDICATION MONITORING: Primary | ICD-10-CM

## 2019-10-31 DIAGNOSIS — K58.0 IRRITABLE BOWEL SYNDROME WITH DIARRHEA: ICD-10-CM

## 2019-10-31 PROCEDURE — G0402 INITIAL PREVENTIVE EXAM: HCPCS | Performed by: FAMILY MEDICINE

## 2019-10-31 RX ORDER — TOPIRAMATE 25 MG/1
25 TABLET ORAL DAILY
Qty: 90 TABLET | Refills: 3 | Status: SHIPPED | OUTPATIENT
Start: 2019-10-31 | End: 2020-10-08 | Stop reason: SDUPTHER

## 2019-10-31 RX ORDER — FOLIC ACID/MULTIVIT,IRON,MINER 0.4MG-18MG
TABLET ORAL
COMMUNITY

## 2019-10-31 RX ORDER — LOPERAMIDE HYDROCHLORIDE 2 MG/1
TABLET ORAL
Qty: 144 TABLET | Refills: 2 | Status: SHIPPED | OUTPATIENT
Start: 2019-10-31 | End: 2020-10-09

## 2019-10-31 RX ORDER — HYOSCYAMINE SULFATE EXTENDED-RELEASE 0.38 MG/1
0.38 TABLET ORAL 2 TIMES DAILY
Qty: 180 TABLET | Refills: 4 | Status: SHIPPED | OUTPATIENT
Start: 2019-10-31 | End: 2020-10-08 | Stop reason: SDUPTHER

## 2019-10-31 RX ORDER — PREDNISOLONE ACETATE 10 MG/ML
SUSPENSION/ DROPS OPHTHALMIC
COMMUNITY
Start: 2019-10-29 | End: 2020-10-08 | Stop reason: ALTCHOICE

## 2019-10-31 RX ORDER — IBUPROFEN 200 MG
TABLET ORAL
COMMUNITY
End: 2022-07-26

## 2019-10-31 NOTE — PROGRESS NOTES
Assessment and Plan:     Problem List Items Addressed This Visit     None           Preventive health issues were discussed with patient, and age appropriate screening tests were ordered as noted in patient's After Visit Summary  Personalized health advice and appropriate referrals for health education or preventive services given if needed, as noted in patient's After Visit Summary  History of Present Illness:     Patient presents for Welcome to Medicare visit  Patient Care Team:  Cristal Pacheco DO as PCP - General  RANDALL Flores     Review of Systems:     Review of Systems   Constitutional: Negative for activity change, appetite change, diaphoresis, fatigue and fever  HENT: Negative  Eyes: Positive for visual disturbance  Respiratory: Negative for apnea, cough, chest tightness, shortness of breath and wheezing  Cardiovascular: Negative for chest pain, palpitations and leg swelling  Gastrointestinal: Negative for abdominal distention, abdominal pain, anal bleeding, constipation, diarrhea, nausea and vomiting  Endocrine: Negative for cold intolerance, heat intolerance, polydipsia, polyphagia and polyuria  Genitourinary: Negative for difficulty urinating, dysuria, flank pain, hematuria and urgency  Musculoskeletal: Positive for arthralgias  Negative for back pain, gait problem, joint swelling and myalgias  Skin: Negative for color change, rash and wound  Allergic/Immunologic: Negative for environmental allergies, food allergies and immunocompromised state  Neurological: Negative for dizziness, seizures, syncope, speech difficulty, numbness and headaches  Hematological: Negative for adenopathy  Does not bruise/bleed easily  Psychiatric/Behavioral: Negative for agitation, behavioral problems, hallucinations, sleep disturbance and suicidal ideas  Problem List:     There is no problem list on file for this patient       Past Medical and Surgical History:     Past Medical History:   Diagnosis Date    Hypertension     last assessed: 06/29/12    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     Past Surgical History:   Procedure Laterality Date    BILATERAL OOPHORECTOMY      BLADDER SURGERY      CATARACT EXTRACTION, BILATERAL      COLPORRHAPHY      Anterior, repair of cystocele    HYSTERECTOMY      at age 43    OOPHORECTOMY Bilateral     at age 43   [place] RETINAL DETACHMENT SURGERY Left       Family History:     Family History   Problem Relation Age of Onset    Aneurysm Mother         Abd aorta aneurysm repair 2 dock limbs w/ visc extension pros    Hypertension Father     Subarachnoid hemorrhage Father       Social History:     Social History     Socioeconomic History    Marital status: /Civil Union     Spouse name: None    Number of children: None    Years of education: None    Highest education level: None   Occupational History    None   Social Needs    Financial resource strain: None    Food insecurity:     Worry: None     Inability: None    Transportation needs:     Medical: None     Non-medical: None   Tobacco Use    Smoking status: Never Smoker    Smokeless tobacco: Never Used   Substance and Sexual Activity    Alcohol use: No    Drug use: No    Sexual activity: Yes   Lifestyle    Physical activity:     Days per week: None     Minutes per session: None    Stress: None   Relationships    Social connections:     Talks on phone: None     Gets together: None     Attends Worship service: None     Active member of club or organization: None     Attends meetings of clubs or organizations: None     Relationship status: None    Intimate partner violence:     Fear of current or ex partner: None     Emotionally abused: None     Physically abused: None     Forced sexual activity: None   Other Topics Concern    None   Social History Narrative    None      Medications and Allergies:     Current Outpatient Medications   Medication Sig Dispense Refill    calcium citrate-vitamin D (CITRACAL+D) 315-200 MG-UNIT per tablet Take 2 tablets by mouth Twice daily OsteoMatrix      carboxymethylcellulose (REFRESH TEARS) 0 5 % SOLN Apply to eye      Cholecalciferol (VITAMIN D3) 1000 units CAPS Take by mouth      hyoscyamine (LEVBID) 0 375 mg 12 hr tablet Take 1 tablet (0 375 mg total) by mouth 2 (two) times a day 180 tablet 4    ibuprofen (ADVIL) 200 mg tablet       loperamide (IMODIUM A-D) 2 MG tablet One in the am and 0 5mg in the evening 144 tablet 2    metoprolol succinate (TOPROL XL) 50 mg 24 hr tablet Take 0 5 tablets by mouth 2 (two) times a day      Multiple Vitamin (DAILY VALUE MULTIVITAMIN PO) Take by mouth      Omega-3 Fatty Acids (OMEGA-3 FISH OIL) 1200 MG CAPS [place] OmegaGuard fish oil      ondansetron (ZOFRAN) 4 mg tablet Take 1 tablet (4 mg total) by mouth every 8 (eight) hours as needed for nausea or vomiting 30 tablet 3    sertraline (ZOLOFT) 50 mg tablet Take 1 tablet (50 mg total) by mouth daily 90 tablet 3    topiramate (TOPAMAX) 25 mg tablet Take 1 tablet (25 mg total) by mouth daily 90 tablet 3    trandolapril (MAVIK) 2 MG tablet Take 1 tablet by mouth daily       No current facility-administered medications for this visit  Allergies   Allergen Reactions    Dust Mite Extract     Flu Virus Vaccine Visual Disturbance     Reaction Date: 28Feb2012;     Molds & Smuts     Juan Grass Pollen Allergen     Tree Extract       Immunizations: There is no immunization history for the selected administration types on file for this patient     Health Maintenance:         Topic Date Due    DXA SCAN  12/21/2017    MAMMOGRAM  12/10/2019    CRC Screening: Colonoscopy  08/07/2023    Hepatitis C Screening  Completed         Topic Date Due    DTaP,Tdap,and Td Vaccines (1 - Tdap) 02/17/1975    Pneumococcal Vaccine: 65+ Years (1 of 2 - PCV13) 02/17/2019      Medicare Screening Tests and Risk Assessments:     Anais Clause is here for her Subsequent Wellness visit  Health Risk Assessment:   Patient rates overall health as good  Patient feels that their physical health rating is same  Eyesight was rated as same  Hearing was rated as same  Patient feels that their emotional and mental health rating is same  Pain experienced in the last 7 days has been none  Patient states that she has experienced no weight loss or gain in last 6 months  Depression Screening:   PHQ-2 Score: 0      Fall Risk Screening: In the past year, patient has experienced: no history of falling in past year      Urinary Incontinence Screening:   Patient has not leaked urine accidently in the last six months  Home Safety:  Patient does not have trouble with stairs inside or outside of their home  Patient has working smoke alarms and has no working carbon monoxide detector  Home safety hazards include: none  Nutrition:   Current diet is Regular and Limited junk food  Medications:   Patient is currently taking over-the-counter supplements  OTC medications include: see medication list  Patient is able to manage medications  Activities of Daily Living (ADLs)/Instrumental Activities of Daily Living (IADLs):   Walk and transfer into and out of bed and chair?: Yes  Dress and groom yourself?: Yes    Bathe or shower yourself?: Yes    Feed yourself?  Yes  Do your laundry/housekeeping?: Yes  Manage your money, pay your bills and track your expenses?: Yes  Make your own meals?: Yes    Do your own shopping?: Yes    Previous Hospitalizations:   Any hospitalizations or ED visits within the last 12 months?: No      Advance Care Planning:   Living will: No    Durable POA for healthcare: No    Advanced directive: No    Advanced directive counseling given: Yes    Five wishes given: Yes    Patient declined ACP directive: Yes    End of Life Decisions reviewed with patient: Yes    Provider agrees with end of life decisions: No      Cognitive Screening:   Provider or family/friend/caregiver concerned regarding cognition?: No    PREVENTIVE SCREENINGS      Cardiovascular Screening:    General: Screening Current      Colorectal Cancer Screening:     General: Screening Current      Breast Cancer Screening:     General: Screening Current      Cervical Cancer Screening:    General: Screening Not Indicated      Osteoporosis Screening:    General: Screening Current      Abdominal Aortic Aneurysm (AAA) Screening:        General: Screening Not Indicated      Lung Cancer Screening:     General: Screening Not Indicated      Hepatitis C Screening:    General: Screening Current    No exam data present     Physical Exam:     /64 (BP Location: Left arm, Patient Position: Sitting, Cuff Size: Standard)   Ht 5' 8" (1 727 m)   Wt 62 6 kg (138 lb)   BMI 20 98 kg/m²     Physical Exam   Constitutional: She is oriented to person, place, and time  She appears well-developed and well-nourished  No distress  HENT:   Head: Normocephalic  Right Ear: External ear normal    Left Ear: External ear normal    Nose: Nose normal    Mouth/Throat: Oropharynx is clear and moist    Eyes: Pupils are equal, round, and reactive to light  Conjunctivae and EOM are normal  Right eye exhibits no discharge  Left eye exhibits no discharge  No scleral icterus  Neck: Normal range of motion  No tracheal deviation present  No thyromegaly present  Cardiovascular: Normal rate, regular rhythm and normal heart sounds  Exam reveals no gallop and no friction rub  No murmur heard  Pulmonary/Chest: Effort normal and breath sounds normal  No respiratory distress  She has no wheezes  Abdominal: Soft  Bowel sounds are normal  She exhibits no mass  There is no tenderness  There is no guarding  Musculoskeletal: She exhibits no edema or deformity  Lymphadenopathy:     She has no cervical adenopathy  Neurological: She is alert and oriented to person, place, and time  No cranial nerve deficit     Skin: Skin is warm and dry  No rash noted  She is not diaphoretic  No erythema  Psychiatric: She has a normal mood and affect   Thought content normal        Pravin Morin,

## 2019-10-31 NOTE — PATIENT INSTRUCTIONS
Medicare Preventive Visit Patient Instructions  Thank you for completing your Welcome to Medicare Visit or Medicare Annual Wellness Visit today  Your next wellness visit will be due in one year (10/31/2020)  The screening/preventive services that you may require over the next 5-10 years are detailed below  Some tests may not apply to you based off risk factors and/or age  Screening tests ordered at today's visit but not completed yet may show as past due  Also, please note that scanned in results may not display below  Preventive Screenings:  Service Recommendations Previous Testing/Comments   Colorectal Cancer Screening  * Colonoscopy    * Fecal Occult Blood Test (FOBT)/Fecal Immunochemical Test (FIT)  * Fecal DNA/Cologuard Test  * Flexible Sigmoidoscopy Age: 54-65 years old   Colonoscopy: every 10 years (may be performed more frequently if at higher risk)  OR  FOBT/FIT: every 1 year  OR  Cologuard: every 3 years  OR  Sigmoidoscopy: every 5 years  Screening may be recommended earlier than age 48 if at higher risk for colorectal cancer  Also, an individualized decision between you and your healthcare provider will decide whether screening between the ages of 74-80 would be appropriate  Colonoscopy: 08/07/2018  FOBT/FIT: Not on file  Cologuard: Not on file  Sigmoidoscopy: Not on file    Screening Current     Breast Cancer Screening Age: 36 years old  Frequency: every 1-2 years  Not required if history of left and right mastectomy Mammogram: 12/10/2018    Screening Current   Cervical Cancer Screening Between the ages of 21-29, pap smear recommended once every 3 years  Between the ages of 33-67, can perform pap smear with HPV co-testing every 5 years     Recommendations may differ for women with a history of total hysterectomy, cervical cancer, or abnormal pap smears in past  Pap Smear: Not on file    Screening Not Indicated   Hepatitis C Screening Once for adults born between 1945 and 1965  More frequently in patients at high risk for Hepatitis C Hep C Antibody: 12/12/2016    Screening Current   Diabetes Screening 1-2 times per year if you're at risk for diabetes or have pre-diabetes Fasting glucose: No results in last 5 years   A1C: No results in last 5 years       Cholesterol Screening Once every 5 years if you don't have a lipid disorder  May order more often based on risk factors  Lipid panel: Not on file         Other Preventive Screenings Covered by Medicare:  1  Abdominal Aortic Aneurysm (AAA) Screening: covered once if your at risk  You're considered to be at risk if you have a family history of AAA  2  Lung Cancer Screening: covers low dose CT scan once per year if you meet all of the following conditions: (1) Age 50-69; (2) No signs or symptoms of lung cancer; (3) Current smoker or have quit smoking within the last 15 years; (4) You have a tobacco smoking history of at least 30 pack years (packs per day multiplied by number of years you smoked); (5) You get a written order from a healthcare provider  3  Glaucoma Screening: covered annually if you're considered high risk: (1) You have diabetes OR (2) Family history of glaucoma OR (3)  aged 48 and older OR (3)  American aged 72 and older  3  Osteoporosis Screening: covered every 2 years if you meet one of the following conditions: (1) You're estrogen deficient and at risk for osteoporosis based off medical history and other findings; (2) Have a vertebral abnormality; (3) On glucocorticoid therapy for more than 3 months; (4) Have primary hyperparathyroidism; (5) On osteoporosis medications and need to assess response to drug therapy  · Last bone density test (DXA Scan): 12/21/2015  5  HIV Screening: covered annually if you're between the age of 12-76  Also covered annually if you are younger than 13 and older than 72 with risk factors for HIV infection   For pregnant patients, it is covered up to 3 times per pregnancy  Immunizations:  Immunization Recommendations   Influenza Vaccine Annual influenza vaccination during flu season is recommended for all persons aged >= 6 months who do not have contraindications   Pneumococcal Vaccine (Prevnar and Pneumovax)  * Prevnar = PCV13  * Pneumovax = PPSV23   Adults 25-60 years old: 1-3 doses may be recommended based on certain risk factors  Adults 72 years old: Prevnar (PCV13) vaccine recommended followed by Pneumovax (PPSV23) vaccine  If already received PPSV23 since turning 65, then PCV13 recommended at least one year after PPSV23 dose  Hepatitis B Vaccine 3 dose series if at intermediate or high risk (ex: diabetes, end stage renal disease, liver disease)   Tetanus (Td) Vaccine - COST NOT COVERED BY MEDICARE PART B Following completion of primary series, a booster dose should be given every 10 years to maintain immunity against tetanus  Td may also be given as tetanus wound prophylaxis  Tdap Vaccine - COST NOT COVERED BY MEDICARE PART B Recommended at least once for all adults  For pregnant patients, recommended with each pregnancy  Shingles Vaccine (Shingrix) - COST NOT COVERED BY MEDICARE PART B  2 shot series recommended in those aged 48 and above     Health Maintenance Due:      Topic Date Due    DXA SCAN  12/21/2017    MAMMOGRAM  12/10/2019    CRC Screening: Colonoscopy  08/07/2023    Hepatitis C Screening  Completed     Immunizations Due:      Topic Date Due    DTaP,Tdap,and Td Vaccines (1 - Tdap) 02/17/1975    Pneumococcal Vaccine: 65+ Years (1 of 2 - PCV13) 02/17/2019     Advance Directives   What are advance directives? Advance directives are legal documents that state your wishes and plans for medical care  These plans are made ahead of time in case you lose your ability to make decisions for yourself  Advance directives can apply to any medical decision, such as the treatments you want, and if you want to donate organs     What are the types of advance directives? There are many types of advance directives, and each state has rules about how to use them  You may choose a combination of any of the following:  · Living will: This is a written record of the treatment you want  You can also choose which treatments you do not want, which to limit, and which to stop at a certain time  This includes surgery, medicine, IV fluid, and tube feedings  · Durable power of  for healthcare [facility]): This is a written record that states who you want to make healthcare choices for you when you are unable to make them for yourself  This person, called a proxy, is usually a family member or a friend  You may choose more than 1 proxy  · Do not resuscitate (DNR) order:  A DNR order is used in case your heart stops beating or you stop breathing  It is a request not to have certain forms of treatment, such as CPR  A DNR order may be included in other types of advance directives  · Medical directive: This covers the care that you want if you are in a coma, near death, or unable to make decisions for yourself  You can list the treatments you want for each condition  Treatment may include pain medicine, surgery, blood transfusions, dialysis, IV or tube feedings, and a ventilator (breathing machine)  · Values history: This document has questions about your views, beliefs, and how you feel and think about life  This information can help others choose the care that you would choose  Why are advance directives important? An advance directive helps you control your care  Although spoken wishes may be used, it is better to have your wishes written down  Spoken wishes can be misunderstood, or not followed  Treatments may be given even if you do not want them  An advance directive may make it easier for your family to make difficult choices about your care        © Copyright [employer] 2018 Information is for End User's use only and may not be sold, redistributed or otherwise used for commercial purposes   All illustrations and images included in CareNotes® are the copyrighted property of A D A M , Inc  or [facility] [place][note 2] Padmaja Pfeiffer

## 2019-12-10 ENCOUNTER — HOSPITAL ENCOUNTER (OUTPATIENT)
Dept: MAMMOGRAPHY | Facility: HOSPITAL | Age: 64
Discharge: HOME | End: 2019-12-10
Payer: MEDICARE

## 2019-12-10 ENCOUNTER — APPOINTMENT (OUTPATIENT)
Dept: LAB | Facility: HOSPITAL | Age: 64
End: 2019-12-10
Attending: FAMILY MEDICINE
Payer: MEDICARE

## 2019-12-10 ENCOUNTER — TRANSCRIBE ORDERS (OUTPATIENT)
Dept: ADMINISTRATIVE | Facility: HOSPITAL | Age: 64
End: 2019-12-10

## 2019-12-10 VITALS — BODY MASS INDEX: 20.92 KG/M2 | HEIGHT: 68 IN | WEIGHT: 138 LBS

## 2019-12-10 DIAGNOSIS — Z12.31 SCREENING MAMMOGRAM FOR HIGH-RISK PATIENT: ICD-10-CM

## 2019-12-10 DIAGNOSIS — Z12.31 SCREENING MAMMOGRAM FOR HIGH-RISK PATIENT: Primary | ICD-10-CM

## 2019-12-10 DIAGNOSIS — Z12.31 ENCOUNTER FOR SCREENING MAMMOGRAM FOR MALIGNANT NEOPLASM OF BREAST: ICD-10-CM

## 2019-12-10 LAB
ALBUMIN SERPL BCP-MCNC: 3.6 G/DL (ref 3.5–5)
ALP SERPL-CCNC: 52 U/L (ref 46–116)
ALT SERPL W P-5'-P-CCNC: 30 U/L (ref 12–78)
AST SERPL W P-5'-P-CCNC: 22 U/L (ref 5–45)
BILIRUB DIRECT SERPL-MCNC: 0.09 MG/DL (ref 0–0.2)
BILIRUB SERPL-MCNC: 0.3 MG/DL (ref 0.2–1)
PROT SERPL-MCNC: 6.8 G/DL (ref 6.4–8.2)

## 2019-12-10 PROCEDURE — 77063 BREAST TOMOSYNTHESIS BI: CPT

## 2019-12-10 PROCEDURE — 80076 HEPATIC FUNCTION PANEL: CPT | Performed by: FAMILY MEDICINE

## 2019-12-10 PROCEDURE — 36415 COLL VENOUS BLD VENIPUNCTURE: CPT | Performed by: FAMILY MEDICINE

## 2019-12-10 PROCEDURE — 77067 SCR MAMMO BI INCL CAD: CPT

## 2020-10-01 DIAGNOSIS — Z13.820 ENCOUNTER FOR SCREENING FOR OSTEOPOROSIS: Primary | ICD-10-CM

## 2020-10-01 DIAGNOSIS — K58.0 IRRITABLE BOWEL SYNDROME WITH DIARRHEA: ICD-10-CM

## 2020-10-01 DIAGNOSIS — Z78.0 MENOPAUSE: ICD-10-CM

## 2020-10-01 DIAGNOSIS — Z12.31 ENCOUNTER FOR SCREENING MAMMOGRAM FOR BREAST CANCER: ICD-10-CM

## 2020-10-01 DIAGNOSIS — D86.9 SARCOIDOSIS: ICD-10-CM

## 2020-10-08 ENCOUNTER — OFFICE VISIT (OUTPATIENT)
Dept: FAMILY MEDICINE CLINIC | Facility: CLINIC | Age: 65
End: 2020-10-08
Payer: MEDICARE

## 2020-10-08 VITALS
SYSTOLIC BLOOD PRESSURE: 124 MMHG | HEIGHT: 68 IN | TEMPERATURE: 96.6 F | DIASTOLIC BLOOD PRESSURE: 60 MMHG | WEIGHT: 128 LBS | BODY MASS INDEX: 19.4 KG/M2

## 2020-10-08 DIAGNOSIS — Z12.31 ENCOUNTER FOR SCREENING MAMMOGRAM FOR MALIGNANT NEOPLASM OF BREAST: Primary | ICD-10-CM

## 2020-10-08 DIAGNOSIS — G43.709 CHRONIC MIGRAINE WITHOUT AURA WITHOUT STATUS MIGRAINOSUS, NOT INTRACTABLE: ICD-10-CM

## 2020-10-08 DIAGNOSIS — Z78.0 MENOPAUSE: ICD-10-CM

## 2020-10-08 DIAGNOSIS — K58.0 IRRITABLE BOWEL SYNDROME WITH DIARRHEA: ICD-10-CM

## 2020-10-08 DIAGNOSIS — D86.9 SARCOIDOSIS: ICD-10-CM

## 2020-10-08 PROCEDURE — 99213 OFFICE O/P EST LOW 20 MIN: CPT | Performed by: FAMILY MEDICINE

## 2020-10-08 RX ORDER — HYOSCYAMINE SULFATE EXTENDED-RELEASE 0.38 MG/1
0.38 TABLET ORAL 2 TIMES DAILY
Qty: 180 TABLET | Refills: 4 | Status: SHIPPED | OUTPATIENT
Start: 2020-10-08 | End: 2021-07-07 | Stop reason: SDUPTHER

## 2020-10-08 RX ORDER — TOPIRAMATE 25 MG/1
25 TABLET ORAL DAILY
Qty: 90 TABLET | Refills: 3 | Status: SHIPPED | OUTPATIENT
Start: 2020-10-08 | End: 2021-07-07 | Stop reason: SDUPTHER

## 2020-10-09 DIAGNOSIS — K58.0 IRRITABLE BOWEL SYNDROME WITH DIARRHEA: ICD-10-CM

## 2020-10-09 RX ORDER — LOPERAMIDE HYDROCHLORIDE 2 MG/1
TABLET ORAL
Qty: 144 TABLET | Refills: 0 | Status: SHIPPED | OUTPATIENT
Start: 2020-10-09 | End: 2020-10-14 | Stop reason: SDUPTHER

## 2020-10-14 DIAGNOSIS — K58.0 IRRITABLE BOWEL SYNDROME WITH DIARRHEA: ICD-10-CM

## 2020-10-14 RX ORDER — LOPERAMIDE HYDROCHLORIDE 2 MG/1
TABLET ORAL
Qty: 144 TABLET | Refills: 5 | Status: SHIPPED | OUTPATIENT
Start: 2020-10-14 | End: 2021-07-07 | Stop reason: SDUPTHER

## 2020-10-21 ENCOUNTER — HOSPITAL ENCOUNTER (OUTPATIENT)
Dept: BONE DENSITY | Facility: HOSPITAL | Age: 65
Discharge: HOME | End: 2020-10-21
Attending: FAMILY MEDICINE
Payer: MEDICARE

## 2020-10-21 DIAGNOSIS — Z78.0 MENOPAUSE: ICD-10-CM

## 2020-10-21 PROCEDURE — 77080 DXA BONE DENSITY AXIAL: CPT

## 2020-12-11 ENCOUNTER — HOSPITAL ENCOUNTER (OUTPATIENT)
Dept: MAMMOGRAPHY | Facility: HOSPITAL | Age: 65
Discharge: HOME | End: 2020-12-11
Payer: MEDICARE

## 2020-12-11 VITALS — WEIGHT: 128 LBS | HEIGHT: 68 IN | BODY MASS INDEX: 19.4 KG/M2

## 2020-12-11 DIAGNOSIS — Z12.31 ENCOUNTER FOR SCREENING MAMMOGRAM FOR MALIGNANT NEOPLASM OF BREAST: ICD-10-CM

## 2020-12-11 DIAGNOSIS — Z12.31 SCREENING MAMMOGRAM FOR HIGH-RISK PATIENT: ICD-10-CM

## 2020-12-11 PROCEDURE — 77063 BREAST TOMOSYNTHESIS BI: CPT

## 2020-12-11 PROCEDURE — 77067 SCR MAMMO BI INCL CAD: CPT

## 2021-01-21 ENCOUNTER — HOSPITAL ENCOUNTER (OUTPATIENT)
Dept: RADIOLOGY | Facility: HOSPITAL | Age: 66
Discharge: HOME | End: 2021-01-21
Attending: FAMILY MEDICINE
Payer: MEDICARE

## 2021-01-21 DIAGNOSIS — D86.9 SARCOIDOSIS: ICD-10-CM

## 2021-01-21 PROCEDURE — 71046 X-RAY EXAM CHEST 2 VIEWS: CPT

## 2021-03-11 ENCOUNTER — TELEPHONE (OUTPATIENT)
Dept: FAMILY MEDICINE CLINIC | Facility: CLINIC | Age: 66
End: 2021-03-11

## 2021-03-11 NOTE — TELEPHONE ENCOUNTER
Patient wants to know if anyone in the area does physical therapy on TMJ, states she just got back from the Dr  And they recommended this  Please advise

## 2021-03-11 NOTE — TELEPHONE ENCOUNTER
I do not know who does TMJ maybe we can call [place] [facility] in they might be able to give us a recommendation

## 2021-03-11 NOTE — TELEPHONE ENCOUNTER
Spoke with Lou Hodges and she said that [employer] did TMJ, called patient and made her aware

## 2021-03-23 NOTE — PROGRESS NOTES
PT Evaluation     Today's date: 3/24/2021  Patient name: Ilean Prader  : 1955  MRN: [id number]  Referring provider: Angel Garnett DDS  Dx:   Encounter Diagnosis     ICD-10-CM    1  Joint disease, temporomandibular  M26 609                   Assessment  Assessment details: PT notes the patient with + TMJ with lateral shift to the right with demonstration of impaired UB posture leading to increase pain levels and functional limitations with need for course of skilled therapy for 2 weeks with focus on TMJ stabilization, posture, manual therapy, analgesic modalities, and HEP  Impairments: abnormal or restricted ROM, activity intolerance, lacks appropriate home exercise program and pain with function  Understanding of Dx/Px/POC: good   Prognosis: good    Goals  ST  Initiate HEP  2  Decrease pain levels by 25-50%   3  Increase strength by 1-2 mm grades  4  Increase ROM by 25-50%   5  No lateral shift with opening   LT  Improve postural awareness   2  Decrease limitations with ADL  3  Decrease limitations with chewing and jaw movement  4  Normal TMJ opening/mobility   5  DC with HEP    Plan  Plan details: PT notes review of POC and findings with patient who is in agreement with PT recommendations of course of skilled therapy  Patient would benefit from: PT eval and skilled physical therapy  Planned modality interventions: thermotherapy: hydrocollator packs  Planned therapy interventions: manual therapy, neuromuscular re-education, patient education, postural training, self care, strengthening, stretching, therapeutic exercise and home exercise program  Frequency: 2x week  Duration in weeks: 2  Treatment plan discussed with: patient        Subjective Evaluation    History of Present Illness  Mechanism of injury: Patient reports development of TMJ pain several years ago from insidious onset  Patient reports her jaw feels tight and just doesn't open well    Patient reports she is being treated by DDS with orthotics to address the symptoms  Patient reports the orthotics have helped some but continuation of limitations with opening so DDS referred patient for TMJ OPPT  Patient reports the increase TMJ has lead to occasional burning in the mouth and TMJ with limitations with chewing and difficulty eating foods  Patient reports she is retired with significant PMH of sarcoidosis, cataract removal, right frozen shoulder, Cardiac abnormality with abnormal HR and blood pressure issues      Pain  No pain reported  Current pain ratin  At best pain ratin  At worst pain rating: 10  Location: Mouth   Quality: burning  Relieving factors: rest    Treatments  Current treatment: physical therapy  Patient Goals  Patient goals for therapy: increased motion, decreased pain and independence with ADLs/IADLs          Objective     Postural Observations  Seated posture: fair  Standing posture: fair        Neurological Testing     Reflexes   Left   Biceps (C5/C6): normal (2+)  Brachioradialis (C6): normal (2+)    Right   Biceps (C5/C6): normal (2+)  Brachioradialis (C6): normal (2+)    Active Range of Motion   Cervical/Thoracic Spine     Normal active range of motion  Left Shoulder   Normal active range of motion    Right Shoulder   Normal active range of motion    Strength/Myotome Testing     Left Shoulder   Normal muscle strength    Right Shoulder   Normal muscle strength  TMJ   Jaw observations: facial symmetry within normal limits  Patient does not have a  cleft palate  good oral hygiene  Jaw trauma: no  Patient uses the following corrective devices: orthodontia  Joint sounds left: clicking and popping  Joint sounds right: clicking and popping  Lateral bite test, Left: pain on right  Lateral bite test, right: pain on right  Opening (mm): right deviation   Lateral excursion, left (mm): pain  Lateral excursion, right (mm)t: within normal limits and pain   Protrusion (mm): restricted   ROM comments: PT notes the patient with + TMJ with lateral shift to the right with opening leading to restricted opening overall              Precautions:  PMH of sarcoidosis, cardiac myopathy with BP issues      Manuals 3/24        Right TMJ STM/TFM 10 min                                    Neuro Re-Ed         Supine chin tucks          Supine DNF                                                       Ther Ex         TMJ Phase 1 Left Shift   10x5" Hold         TMJ Phase 2 Left Shift  10x5" Hold         TMJ Phase 3         TMJ Phase 4 Protrusion Only   10x5" Hold         TMJ Phase 5         TMJ Phase 6                   HEP update/review  15 min         Ther Activity                           Gait Training                           Modalities         MHP to the right TMJ in supine  PRN

## 2021-03-24 ENCOUNTER — TRANSCRIBE ORDERS (OUTPATIENT)
Dept: PHYSICAL THERAPY | Facility: CLINIC | Age: 66
End: 2021-03-24

## 2021-03-24 ENCOUNTER — EVALUATION (OUTPATIENT)
Dept: PHYSICAL THERAPY | Facility: CLINIC | Age: 66
End: 2021-03-24
Payer: MEDICARE

## 2021-03-24 DIAGNOSIS — M26.609 JOINT DISEASE, TEMPOROMANDIBULAR: Primary | ICD-10-CM

## 2021-03-24 PROCEDURE — 97110 THERAPEUTIC EXERCISES: CPT | Performed by: PHYSICAL THERAPIST

## 2021-03-24 PROCEDURE — 97535 SELF CARE MNGMENT TRAINING: CPT | Performed by: PHYSICAL THERAPIST

## 2021-03-24 PROCEDURE — 97161 PT EVAL LOW COMPLEX 20 MIN: CPT | Performed by: PHYSICAL THERAPIST

## 2021-03-24 NOTE — LETTER
2021    Sydnee Renteria, [address]  [address] [place] [address]    Patient: Kaya Srivastava   YOB: 1955   Date of Visit: 3/24/2021     Encounter Diagnosis     ICD-10-CM    1  Joint disease, temporomandibular  M26 609        Dear Dr Wilfred Ulrich:    Thank you for your recent referral of Kaya Srivastava  Please review the attached evaluation summary from Zulema's recent visit  Please verify that you agree with the plan of care by signing the attached order  If you have any questions or concerns, please do not hesitate to call  I sincerely appreciate the opportunity to share in the care of one of your patients and hope to have another opportunity to work with you in the near future  Sincerely,    Gris Jimenez, PT      Referring Provider:      I certify that I have read the below Plan of Care and certify the need for these services furnished under this plan of treatment while under my care  Sydnee Dexter, [address] [place] [address] Drive  [phone]  Via Fax: [phone]          PT Evaluation     Today's date: 3/24/2021  Patient name: Kaya Srivastava  : 1955  MRN: [id number]  Referring provider: Claire Cotto DDS  Dx:   Encounter Diagnosis     ICD-10-CM    1  Joint disease, temporomandibular  M26 609                   Assessment  Assessment details: PT notes the patient with + TMJ with lateral shift to the right with demonstration of impaired UB posture leading to increase pain levels and functional limitations with need for course of skilled therapy for 2 weeks with focus on TMJ stabilization, posture, manual therapy, analgesic modalities, and HEP  Impairments: abnormal or restricted ROM, activity intolerance, lacks appropriate home exercise program and pain with function  Understanding of Dx/Px/POC: good   Prognosis: good    Goals  ST  Initiate HEP  2  Decrease pain levels by 25-50%   3  Increase strength by 1-2 mm grades  4  Increase ROM by 25-50%   5  No lateral shift with opening   LT  Improve postural awareness   2  Decrease limitations with ADL  3  Decrease limitations with chewing and jaw movement  4  Normal TMJ opening/mobility   5  DC with HEP    Plan  Plan details: PT notes review of POC and findings with patient who is in agreement with PT recommendations of course of skilled therapy  Patient would benefit from: PT eval and skilled physical therapy  Planned modality interventions: thermotherapy: hydrocollator packs  Planned therapy interventions: manual therapy, neuromuscular re-education, patient education, postural training, self care, strengthening, stretching, therapeutic exercise and home exercise program  Frequency: 2x week  Duration in weeks: 2  Treatment plan discussed with: patient        Subjective Evaluation    History of Present Illness  Mechanism of injury: Patient reports development of TMJ pain several years ago from insidious onset  Patient reports her jaw feels tight and just doesn't open well  Patient reports she is being treated by DDS with orthotics to address the symptoms  Patient reports the orthotics have helped some but continuation of limitations with opening so DDS referred patient for TMJ OPPT  Patient reports the increase TMJ has lead to occasional burning in the mouth and TMJ with limitations with chewing and difficulty eating foods  Patient reports she is retired with significant PMH of sarcoidosis, cataract removal, right frozen shoulder, Cardiac abnormality with abnormal HR and blood pressure issues      Pain  No pain reported  Current pain ratin  At best pain ratin  At worst pain rating: 10  Location: Mouth   Quality: burning  Relieving factors: rest    Treatments  Current treatment: physical therapy  Patient Goals  Patient goals for therapy: increased motion, decreased pain and independence with ADLs/IADLs          Objective     Postural Observations  Seated posture: fair  Standing posture: fair        Neurological Testing     Reflexes   Left   Biceps (C5/C6): normal (2+)  Brachioradialis (C6): normal (2+)    Right   Biceps (C5/C6): normal (2+)  Brachioradialis (C6): normal (2+)    Active Range of Motion   Cervical/Thoracic Spine     Normal active range of motion  Left Shoulder   Normal active range of motion    Right Shoulder   Normal active range of motion    Strength/Myotome Testing     Left Shoulder   Normal muscle strength    Right Shoulder   Normal muscle strength  TMJ   Jaw observations: facial symmetry within normal limits  Patient does not have a  cleft palate  good oral hygiene  Jaw trauma: no  Patient uses the following corrective devices: orthodontia  Joint sounds left: clicking and popping  Joint sounds right: clicking and popping  Lateral bite test, Left: pain on right  Lateral bite test, right: pain on right  Opening (mm): right deviation   Lateral excursion, left (mm): pain  Lateral excursion, right (mm)t: within normal limits and pain   Protrusion (mm): restricted   ROM comments: PT notes the patient with + TMJ with lateral shift to the right with opening leading to restricted opening overall              Precautions:  PMH of sarcoidosis, cardiac myopathy with BP issues      Manuals 3/24        Right TMJ STM/TFM 10 min                                    Neuro Re-Ed         Supine chin tucks          Supine DNF                                                       Ther Ex         TMJ Phase 1 Left Shift   10x5" Hold         TMJ Phase 2 Left Shift  10x5" Hold         TMJ Phase 3         TMJ Phase 4 Protrusion Only   10x5" Hold         TMJ Phase 5         TMJ Phase 6                   HEP update/review  15 min         Ther Activity                           Gait Training                           Modalities         MHP to the right TMJ in supine  PRN

## 2021-03-30 ENCOUNTER — OFFICE VISIT (OUTPATIENT)
Dept: PHYSICAL THERAPY | Facility: CLINIC | Age: 66
End: 2021-03-30
Payer: MEDICARE

## 2021-03-30 DIAGNOSIS — M26.609 JOINT DISEASE, TEMPOROMANDIBULAR: Primary | ICD-10-CM

## 2021-03-30 PROCEDURE — 97535 SELF CARE MNGMENT TRAINING: CPT | Performed by: PHYSICAL THERAPIST

## 2021-03-30 PROCEDURE — 97140 MANUAL THERAPY 1/> REGIONS: CPT | Performed by: PHYSICAL THERAPIST

## 2021-03-30 NOTE — PROGRESS NOTES
Daily Note     Today's date: 3/30/2021  Patient name: Earl Shelton  : 1955  MRN: [id number]  Referring provider: Cristina Baugh DDS  Dx:   Encounter Diagnosis     ICD-10-CM    1  Joint disease, temporomandibular  M26 609                   Subjective:  Patient denies any pain or symptoms in the TMJ this morning but states she feels the TMJ is moving better with increase ease with opening  Patient states verbal understanding of updated HEP  Objective: See treatment diary below      Assessment: Tolerated treatment well  PT notes start of POC with focus on posture and TMJ stabilization to decrease symptoms and improve functional limitations to meet therapy goals  PT notes continuation of + TMJ dysfunction with demonstration of impaired UB posture with need for continuation of skilled therapy  Plan: Continue per plan of care        Precautions:  PMH of sarcoidosis, cardiac myopathy with BP issues      Manuals 3/24 3/30       Right TMJ STM/TFM 10 min  10 min                                   Neuro Re-Ed         Supine chin tucks   10x5" Hold        Supine DNF   10x5" Hold        Opening with tongue on roof of mouth   5x5" Hold                                            Ther Ex         TMJ Phase 1 Left Shift   10x5" Hold  Left Shift   10x5" Hold        TMJ Phase 2 Left Shift  10x5" Hold  Left Shift  10x5" Hold        TMJ Phase 3         TMJ Phase 4 Protrusion Only   10x5" Hold  Protrusion Only   10x5" Hold        TMJ Phase 5         TMJ Phase 6   5x5" Hold   Min Pull                 HEP update/review  15 min  10 min        Ther Activity                           Gait Training                           Modalities         MHP to the right TMJ in supine  PRN

## 2021-04-02 ENCOUNTER — OFFICE VISIT (OUTPATIENT)
Dept: PHYSICAL THERAPY | Facility: CLINIC | Age: 66
End: 2021-04-02
Payer: MEDICARE

## 2021-04-02 DIAGNOSIS — M26.609 JOINT DISEASE, TEMPOROMANDIBULAR: Primary | ICD-10-CM

## 2021-04-02 PROCEDURE — 97110 THERAPEUTIC EXERCISES: CPT | Performed by: PHYSICAL THERAPIST

## 2021-04-02 PROCEDURE — 97535 SELF CARE MNGMENT TRAINING: CPT | Performed by: PHYSICAL THERAPIST

## 2021-04-02 PROCEDURE — 97140 MANUAL THERAPY 1/> REGIONS: CPT | Performed by: PHYSICAL THERAPIST

## 2021-04-02 NOTE — PROGRESS NOTES
Daily Note     Today's date: 2021  Patient name: Ayesha Toro  : 1955  MRN: [id number]  Referring provider: Jus Kim DDS  Dx:   Encounter Diagnosis     ICD-10-CM    1  Joint disease, temporomandibular  M26 609                   Subjective:  Patient denies any pain in the TMJ t/o the session  Patient feels the TMJ is moving better and opening more  Objective: See treatment diary below      Assessment: Tolerated treatment well  PT notes continuation of progression of POC with focus on TMJ stabilization and posture to decrease symptoms and improve functional limitations to meet therapy goals  PT notes review of HEP with written instructions to patient  PT will look to DC with HEP next session if no worsening of present status  Plan: Continue per plan of care        Precautions:  PMH of sarcoidosis, cardiac myopathy with BP issues      Manuals 3/24 3/30 4/2      Right TMJ STM/TFM 10 min  10 min  10 min                                  Neuro Re-Ed         Supine chin tucks   10x5" Hold  10x5" Hold       Supine DNF   10x5" Hold  10x5" Hold       Opening with tongue on roof of mouth   5x5" Hold  10x5" Hold                                           Ther Ex         TMJ Phase 1 Left Shift   10x5" Hold  Left Shift   10x5" Hold  Bilat Side  5x5" Hold       TMJ Phase 2 Left Shift  10x5" Hold  Left Shift  10x5" Hold  Bilat Side   5x5" Hold       TMJ Phase 3         TMJ Phase 4 Protrusion Only   10x5" Hold  Protrusion Only   10x5" Hold  Protrusion   Only   10x5" Hold       TMJ Phase 5         TMJ Phase 6   5x5" Hold   Min Pull  5x5" Hold Min Pull                HEP update/review  15 min  10 min  15 min       Ther Activity                           Gait Training                           Modalities         MHP to the right TMJ in supine  PRN

## 2021-04-06 ENCOUNTER — TRANSCRIBE ORDERS (OUTPATIENT)
Dept: PHYSICAL THERAPY | Facility: CLINIC | Age: 66
End: 2021-04-06

## 2021-04-06 ENCOUNTER — OFFICE VISIT (OUTPATIENT)
Dept: PHYSICAL THERAPY | Facility: CLINIC | Age: 66
End: 2021-04-06
Payer: MEDICARE

## 2021-04-06 DIAGNOSIS — M26.609 JOINT DISEASE, TEMPOROMANDIBULAR: Primary | ICD-10-CM

## 2021-04-06 PROCEDURE — 97140 MANUAL THERAPY 1/> REGIONS: CPT | Performed by: PHYSICAL THERAPIST

## 2021-04-06 PROCEDURE — 97535 SELF CARE MNGMENT TRAINING: CPT | Performed by: PHYSICAL THERAPIST

## 2021-04-06 NOTE — LETTER
2021    Jerman Boggs, [address]  [address] [place] [address]    Patient: Nicko Morris   YOB: 1955   Date of Visit: 2021     Encounter Diagnosis     ICD-10-CM    1  Joint disease, temporomandibular  M26 609        Dear Dr Eugenia Macdonald:    Thank you for your recent referral of Nicko Morris  Please review the attached discharge summary from Zulema's recent visit  Please verify that you agree with the plan of care by signing the attached order  If you have any questions or concerns, please do not hesitate to call  I sincerely appreciate the opportunity to share in the care of one of your patients and hope to have another opportunity to work with you in the near future  Sincerely,    Grayson Deshpande, PT      Referring Provider:      I certify that I have read the below Plan of Care and certify the need for these services furnished under this plan of treatment while under my care  Jerman Boggs, [address] [place] [address]  [phone]  Via Fax: [phone]          PT Discharge    Today's date: 2021  Patient name: Nicko Morris  : 1955  MRN: [id number]  Referring provider: Danuta Luna DDS  Dx:   Encounter Diagnosis     ICD-10-CM    1  Joint disease, temporomandibular  M26 609                   Assessment  Assessment details: PT notes the patient with improvement with TMJ with no lateral shift or restrictions with opening as well as improved UB posture so PT notes the patient has met all therapy goals and is ready for a DC with HEP  Impairments: abnormal or restricted ROM, activity intolerance, lacks appropriate home exercise program and pain with function  Understanding of Dx/Px/POC: good   Prognosis: good    Goals  ST  Initiate HEP-MET  2  Decrease pain levels by 25-50%-MET   3  Increase strength by 1-2 mm grades-MET  4  Increase ROM by 25-50%-MET   5  No lateral shift with opening-MET   LT    Improve postural awareness-MET  2  Decrease limitations with ADL-MET  3  Decrease limitations with chewing and jaw movement-MET  4  Normal TMJ opening/mobility-MET   5   DC with HEP-MET    Plan  Plan details:  DC with HEP  Patient would benefit from: PT eval and skilled physical therapy  Planned modality interventions: thermotherapy: hydrocollator packs  Planned therapy interventions: manual therapy, neuromuscular re-education, patient education, postural training, self care, strengthening, stretching, therapeutic exercise and home exercise program  Frequency: 2x week  Duration in weeks: 1  Treatment plan discussed with: patient        Subjective Evaluation    History of Present Illness  Mechanism of injury: Patient reports development of TMJ pain several years ago from insidious onset  Patient reports her jaw feels tight and just doesn't open well  Patient reports she is being treated by DDS with orthotics to address the symptoms  Patient reports the orthotics have helped some but continuation of limitations with opening so DDS referred patient for TMJ OPPT  Patient reports the increase TMJ has lead to occasional burning in the mouth and TMJ with limitations with chewing and difficulty eating foods  Patient reports she is retired with significant PMH of sarcoidosis, cataract removal, right frozen shoulder, Cardiac abnormality with abnormal HR and blood pressure issues  Presently the patient has attended 4 sessions of skilled therapy and feels 70-75% improvement since the start of therapy  Patient feels her pain levels are down in the TMJ with increase opening of the jaw  Patient reports she is happy with current status and feels she is ready for a DC with HEP      Pain  No pain reported  Current pain ratin  At best pain ratin  At worst pain ratin  Location: Mouth   Quality: burning  Relieving factors: rest  Progression: improved    Treatments  Current treatment: physical therapy  Patient Goals  Patient goals for therapy: increased motion, decreased pain and independence with ADLs/IADLs          Objective     Postural Observations  Seated posture: fair  Standing posture: fair        Neurological Testing     Reflexes   Left   Biceps (C5/C6): normal (2+)  Brachioradialis (C6): normal (2+)    Right   Biceps (C5/C6): normal (2+)  Brachioradialis (C6): normal (2+)    Active Range of Motion   Cervical/Thoracic Spine     Normal active range of motion  Left Shoulder   Normal active range of motion    Right Shoulder   Normal active range of motion    Strength/Myotome Testing     Left Shoulder   Normal muscle strength    Right Shoulder   Normal muscle strength  TMJ   Jaw observations: facial symmetry within normal limits  Patient does not have a  cleft palate  good oral hygiene  Jaw trauma: no  Patient uses the following corrective devices: orthodontia  Joint sounds left: clicking  Joint sounds right: clicking  Opening (mm): within normal limits   Lateral excursion, left (mm): within normal limits  Lateral excursion, right (mm)t: within normal limits   Protrusion (mm): within normal limits   ROM comments: PT notes the patient with no lateral shift with opening of the TMJ as well as improved ROM with opening to WNL              Precautions:  PMH of sarcoidosis, cardiac myopathy with BP issues      Manuals 3/24 3/30 4/2 4/6    Right TMJ STM/TFM 10 min  10 min  10 min  10 min                             Neuro Re-Ed        Supine chin tucks   10x5" Hold  10x5" Hold      Supine DNF   10x5" Hold  10x5" Hold      Opening with tongue on roof of mouth   5x5" Hold  10x5" Hold                                      Ther Ex        TMJ Phase 1 Left Shift   10x5" Hold  Left Shift   10x5" Hold  Bilat Side  5x5" Hold      TMJ Phase 2 Left Shift  10x5" Hold  Left Shift  10x5" Hold  Bilat Side   5x5" Hold      TMJ Phase 3        TMJ Phase 4 Protrusion Only   10x5" Hold  Protrusion Only   10x5" Hold  Protrusion   Only   10x5" Hold TMJ Phase 5        TMJ Phase 6   5x5" Hold   Min Pull  5x5" Hold Min Pull              HEP update/review  15 min  10 min  15 min  20 min     Ther Activity                        Gait Training                        Modalities        MHP to the right TMJ in supine  PRN

## 2021-04-06 NOTE — PROGRESS NOTES
PT Discharge    Today's date: 2021  Patient name: Silvia Robles  : 1955  MRN: [id number]  Referring provider: Coni Iyer DDS  Dx:   Encounter Diagnosis     ICD-10-CM    1  Joint disease, temporomandibular  M26 609                   Assessment  Assessment details: PT notes the patient with improvement with TMJ with no lateral shift or restrictions with opening as well as improved UB posture so PT notes the patient has met all therapy goals and is ready for a DC with HEP  Impairments: abnormal or restricted ROM, activity intolerance, lacks appropriate home exercise program and pain with function  Understanding of Dx/Px/POC: good   Prognosis: good    Goals  ST  Initiate HEP-MET  2  Decrease pain levels by 25-50%-MET   3  Increase strength by 1-2 mm grades-MET  4  Increase ROM by 25-50%-MET   5  No lateral shift with opening-MET   LT  Improve postural awareness-MET  2  Decrease limitations with ADL-MET  3  Decrease limitations with chewing and jaw movement-MET  4  Normal TMJ opening/mobility-MET   5   DC with HEP-MET    Plan  Plan details:  DC with HEP  Patient would benefit from: PT eval and skilled physical therapy  Planned modality interventions: thermotherapy: hydrocollator packs  Planned therapy interventions: manual therapy, neuromuscular re-education, patient education, postural training, self care, strengthening, stretching, therapeutic exercise and home exercise program  Frequency: 2x week  Duration in weeks: 1  Treatment plan discussed with: patient        Subjective Evaluation    History of Present Illness  Mechanism of injury: Patient reports development of TMJ pain several years ago from insidious onset  Patient reports her jaw feels tight and just doesn't open well  Patient reports she is being treated by [facility] with orthotics to address the symptoms    Patient reports the orthotics have helped some but continuation of limitations with opening so DDS referred patient for TMJ OPPT  Patient reports the increase TMJ has lead to occasional burning in the mouth and TMJ with limitations with chewing and difficulty eating foods  Patient reports she is retired with significant PMH of sarcoidosis, cataract removal, right frozen shoulder, Cardiac abnormality with abnormal HR and blood pressure issues  Presently the patient has attended 4 sessions of skilled therapy and feels 70-75% improvement since the start of therapy  Patient feels her pain levels are down in the TMJ with increase opening of the jaw  Patient reports she is happy with current status and feels she is ready for a DC with HEP      Pain  No pain reported  Current pain ratin  At best pain ratin  At worst pain ratin  Location: Mouth   Quality: burning  Relieving factors: rest  Progression: improved    Treatments  Current treatment: physical therapy  Patient Goals  Patient goals for therapy: increased motion, decreased pain and independence with ADLs/IADLs          Objective     Postural Observations  Seated posture: fair  Standing posture: fair        Neurological Testing     Reflexes   Left   Biceps (C5/C6): normal (2+)  Brachioradialis (C6): normal (2+)    Right   Biceps (C5/C6): normal (2+)  Brachioradialis (C6): normal (2+)    Active Range of Motion   Cervical/Thoracic Spine     Normal active range of motion  Left Shoulder   Normal active range of motion    Right Shoulder   Normal active range of motion    Strength/Myotome Testing     Left Shoulder   Normal muscle strength    Right Shoulder   Normal muscle strength  TMJ   Jaw observations: facial symmetry within normal limits  Patient does not have a  cleft palate  good oral hygiene  Jaw trauma: no  Patient uses the following corrective devices: orthodontia  Joint sounds left: clicking  Joint sounds right: clicking  Opening (mm): within normal limits   Lateral excursion, left (mm): within normal limits  Lateral excursion, right (mm)t: within normal limits   Protrusion (mm): within normal limits   ROM comments: PT notes the patient with no lateral shift with opening of the TMJ as well as improved ROM with opening to WNL              Precautions:  PMH of sarcoidosis, cardiac myopathy with BP issues      Manuals 3/24 3/30 4/2 4/6    Right TMJ STM/TFM 10 min  10 min  10 min  10 min                             Neuro Re-Ed        Supine chin tucks   10x5" Hold  10x5" Hold      Supine DNF   10x5" Hold  10x5" Hold      Opening with tongue on roof of mouth   5x5" Hold  10x5" Hold                                      Ther Ex        TMJ Phase 1 Left Shift   10x5" Hold  Left Shift   10x5" Hold  Bilat Side  5x5" Hold      TMJ Phase 2 Left Shift  10x5" Hold  Left Shift  10x5" Hold  Bilat Side   5x5" Hold      TMJ Phase 3        TMJ Phase 4 Protrusion Only   10x5" Hold  Protrusion Only   10x5" Hold  Protrusion   Only   10x5" Hold      TMJ Phase 5        TMJ Phase 6   5x5" Hold   Min Pull  5x5" Hold Min Pull              HEP update/review  15 min  10 min  15 min  20 min     Ther Activity                        Gait Training                        Modalities        MHP to the right TMJ in supine  PRN

## 2021-04-09 ENCOUNTER — APPOINTMENT (OUTPATIENT)
Dept: PHYSICAL THERAPY | Facility: CLINIC | Age: 66
End: 2021-04-09
Payer: MEDICARE

## 2021-07-07 ENCOUNTER — OFFICE VISIT (OUTPATIENT)
Dept: FAMILY MEDICINE CLINIC | Facility: CLINIC | Age: 66
End: 2021-07-07
Payer: MEDICARE

## 2021-07-07 VITALS
HEART RATE: 76 BPM | BODY MASS INDEX: 19.16 KG/M2 | WEIGHT: 126.4 LBS | TEMPERATURE: 97.2 F | HEIGHT: 68 IN | OXYGEN SATURATION: 98 % | SYSTOLIC BLOOD PRESSURE: 114 MMHG | DIASTOLIC BLOOD PRESSURE: 56 MMHG

## 2021-07-07 DIAGNOSIS — Z00.00 MEDICARE ANNUAL WELLNESS VISIT, SUBSEQUENT: Primary | ICD-10-CM

## 2021-07-07 DIAGNOSIS — K58.0 IRRITABLE BOWEL SYNDROME WITH DIARRHEA: ICD-10-CM

## 2021-07-07 DIAGNOSIS — D86.9 SARCOIDOSIS: ICD-10-CM

## 2021-07-07 DIAGNOSIS — G43.709 CHRONIC MIGRAINE WITHOUT AURA WITHOUT STATUS MIGRAINOSUS, NOT INTRACTABLE: ICD-10-CM

## 2021-07-07 PROCEDURE — G0438 PPPS, INITIAL VISIT: HCPCS | Performed by: PHYSICIAN ASSISTANT

## 2021-07-07 PROCEDURE — 1123F ACP DISCUSS/DSCN MKR DOCD: CPT | Performed by: PHYSICIAN ASSISTANT

## 2021-07-07 RX ORDER — LOPERAMIDE HYDROCHLORIDE 2 MG/1
TABLET ORAL
Qty: 144 TABLET | Refills: 5 | Status: SHIPPED | OUTPATIENT
Start: 2021-07-07 | End: 2022-07-08 | Stop reason: SDUPTHER

## 2021-07-07 RX ORDER — TOPIRAMATE 25 MG/1
25 TABLET ORAL DAILY
Qty: 90 TABLET | Refills: 3 | Status: SHIPPED | OUTPATIENT
Start: 2021-07-07 | End: 2022-07-08 | Stop reason: SDUPTHER

## 2021-07-07 RX ORDER — HYOSCYAMINE SULFATE EXTENDED-RELEASE 0.38 MG/1
0.38 TABLET ORAL 2 TIMES DAILY
Qty: 180 TABLET | Refills: 4 | Status: SHIPPED | OUTPATIENT
Start: 2021-07-07 | End: 2022-07-08 | Stop reason: SDUPTHER

## 2021-07-07 NOTE — PROGRESS NOTES
Assessment and Plan:     Problem List Items Addressed This Visit        Other    Sarcoidosis    Relevant Orders    XR chest pa & lateral      Other Visit Diagnoses     Medicare annual wellness visit, subsequent    -  Primary    Relevant Orders    Comprehensive metabolic panel    Lipid panel    Irritable bowel syndrome with diarrhea        Relevant Medications    hyoscyamine (LEVBID) 0 375 mg 12 hr tablet    loperamide (Anti-Diarrheal) 2 MG tablet    Chronic migraine without aura without status migrainosus, not intractable        Relevant Medications    sertraline (ZOLOFT) 50 mg tablet    topiramate (TOPAMAX) 25 mg tablet    Other Relevant Orders    Comprehensive metabolic panel           Preventive health issues were discussed with patient, and age appropriate screening tests were ordered as noted in patient's After Visit Summary  Personalized health advice and appropriate referrals for health education or preventive services given if needed, as noted in patient's After Visit Summary  Routine labs ordered  She will get her CXR completed  She will continue same medications  Follow-up in 6 months or sooner if needed  History of Present Illness:     Patient presents for Medicare Annual Wellness visit  She does not have any acute problems or concerns  She is asking for refills on her medications  She is doing fine on all of them  She follows yearly with her cardiologist in [place]       Patient Care Team:  Laurie Dennison DO as PCP - RANDALL Cespedes     Problem List:     Patient Active Problem List   Diagnosis    Sarcoidosis      Past Medical and Surgical History:     Past Medical History:   Diagnosis Date    Hypertension     last assessed: 06/29/12    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     Past Surgical History:   Procedure Laterality Date    BILATERAL OOPHORECTOMY      BLADDER SURGERY      CATARACT EXTRACTION, BILATERAL      COLPORRHAPHY      Anterior, repair of cystocele    HYSTERECTOMY      at age 43    OOPHORECTOMY Bilateral     at age 43    RETINAL DETACHMENT SURGERY Left       Family History:     Family History   Problem Relation Age of Onset    Aneurysm Mother         Abd aorta aneurysm repair 2 dock limbs w/ visc extension pros    Hypertension Father     Subarachnoid hemorrhage Father     No Known Problems Daughter     No Known Problems Maternal Grandmother     No Known Problems Maternal Grandfather     No Known Problems Paternal Grandmother     No Known Problems Paternal Grandfather     No Known Problems Paternal Aunt       Social History:     Social History     Socioeconomic History    Marital status: /Civil Union     Spouse name: None    Number of children: None    Years of education: None    Highest education level: None   Occupational History    None   Tobacco Use    Smoking status: Never Smoker    Smokeless tobacco: Never Used   Substance and Sexual Activity    Alcohol use: Not Currently    Drug use: No    Sexual activity: Yes   Other Topics Concern    None   Social History Narrative    None     Social Determinants of Health     Financial Resource Strain:     Difficulty of Paying Living Expenses:    Food Insecurity:     Worried About Running Out of Food in the Last Year:     Ran Out of Food in the Last Year:    Transportation Needs:     Lack of Transportation (Medical):      Lack of Transportation (Non-Medical):    Physical Activity:     Days of Exercise per Week:     Minutes of Exercise per Session:    Stress:     Feeling of Stress :    Social Connections:     Frequency of Communication with Friends and Family:     Frequency of Social Gatherings with Friends and Family:     Attends Mu-ism Services:     Active Member of Clubs or Organizations:     Attends Club or Organization Meetings:     Marital Status:    Intimate Partner Violence:     Fear of Current or Ex-Partner:     Emotionally Abused:     Physically Abused:     Sexually Abused:       Medications and Allergies:     Current Outpatient Medications   Medication Sig Dispense Refill    calcium citrate-vitamin D (CITRACAL+D) 315-200 MG-UNIT per tablet Take 2 tablets by mouth Twice daily OsteoMatrix      carboxymethylcellulose (REFRESH TEARS) 0 5 % SOLN Apply to eye      Cholecalciferol (VITAMIN D3) 1000 units CAPS Take by mouth      hyoscyamine (LEVBID) 0 375 mg 12 hr tablet Take 1 tablet (0 375 mg total) by mouth 2 (two) times a day 180 tablet 4    ibuprofen (ADVIL) 200 mg tablet       loperamide (Anti-Diarrheal) 2 MG tablet TAKE ONE TABLET BY MOUTH IN THE MORNING AND ONE-HALF TABLET IN THE EVENING 144 tablet 5    metoprolol succinate (TOPROL XL) 50 mg 24 hr tablet Take 0 5 tablets by mouth 2 (two) times a day      Multiple Vitamin (DAILY VALUE MULTIVITAMIN PO) Take by mouth      Omega-3 Fatty Acids (OMEGA-3 FISH OIL) 1200 MG CAPS [employer] OmegaGuard fish oil      ondansetron (ZOFRAN) 4 mg tablet Take 1 tablet (4 mg total) by mouth every 8 (eight) hours as needed for nausea or vomiting 30 tablet 3    sertraline (ZOLOFT) 50 mg tablet Take 1 tablet (50 mg total) by mouth daily 90 tablet 3    topiramate (TOPAMAX) 25 mg tablet Take 1 tablet (25 mg total) by mouth daily 90 tablet 3    trandolapril (MAVIK) 2 MG tablet Take 1 tablet by mouth daily       No current facility-administered medications for this visit  Allergies   Allergen Reactions    Tramadol Itching    Dust Mite Extract     Flu Virus Vaccine Visual Disturbance     Reaction Date: 28Feb2012;     Molds & Smuts     Juan Grass Pollen Allergen     Tree Extract       Immunizations: There is no immunization history for the selected administration types on file for this patient     Health Maintenance:         Topic Date Due    MAMMOGRAM  12/11/2021    DXA SCAN  10/21/2022    Colorectal Cancer Screening  08/07/2023    Hepatitis C Screening  Completed         Topic Date Due    COVID-19 Vaccine (1) Never done    DTaP,Tdap,and Td Vaccines (1 - Tdap) Never done    Pneumococcal Vaccine: 65+ Years (1 of 1 - PPSV23) Never done      Medicare Health Risk Assessment:     /56   Pulse 76   Temp (!) 97 2 °F (36 2 °C)   Ht 5' 8" (1 727 m)   Wt 57 3 kg (126 lb 6 4 oz)   SpO2 98%   BMI 19 22 kg/m²      Micki Johnson is here for her Subsequent Wellness visit  Health Risk Assessment:   Patient rates overall health as good  Patient feels that their physical health rating is same  Patient is satisfied with their life  Eyesight was rated as same  Hearing was rated as same  Patient feels that their emotional and mental health rating is same  Patients states they are sometimes angry  Patient states they are never, rarely unusually tired/fatigued  Pain experienced in the last 7 days has been none  Patient states that she has experienced no weight loss or gain in last 6 months  Depression Screening:   PHQ-2 Score: 0      Fall Risk Screening: In the past year, patient has experienced: no history of falling in past year      Urinary Incontinence Screening:   Patient has not leaked urine accidently in the last six months  Home Safety:  Patient does not have trouble with stairs inside or outside of their home  Patient has working smoke alarms and has working carbon monoxide detector  Home safety hazards include: none  Nutrition:   Current diet is Regular  Medications:   Patient is currently taking over-the-counter supplements  OTC medications include: see medication list  Patient is able to manage medications  Activities of Daily Living (ADLs)/Instrumental Activities of Daily Living (IADLs):   Walk and transfer into and out of bed and chair?: Yes  Dress and groom yourself?: Yes    Bathe or shower yourself?: Yes    Feed yourself?  Yes  Do your laundry/housekeeping?: Yes  Manage your money, pay your bills and track your expenses?: Yes  Make your own meals?: Yes    Do your own shopping?: Yes    Previous Hospitalizations:   Any hospitalizations or ED visits within the last 12 months?: No      Advance Care Planning:   Living will: No    Durable POA for healthcare: No    Advanced directive: No      Cognitive Screening:   Provider or family/friend/caregiver concerned regarding cognition?: No    PREVENTIVE SCREENINGS      Cardiovascular Screening:    General: Risks and Benefits Discussed    Due for: Lipid Panel      Diabetes Screening:     General: Risks and Benefits Discussed    Due for: Blood Glucose      Colorectal Cancer Screening:     General: Screening Current      Breast Cancer Screening:     General: Screening Current      Cervical Cancer Screening:    General: Screening Not Indicated      Osteoporosis Screening:    General: Screening Current      Abdominal Aortic Aneurysm (AAA) Screening:        General: Screening Not Indicated      Lung Cancer Screening:     General: Screening Not Indicated      Hepatitis C Screening:    General: Screening Current    Screening, Brief Intervention, and Referral to Treatment (SBIRT)    Screening  Typical number of drinks in a day: 0  Typical number of drinks in a week: 0  Interpretation: Low risk drinking behavior  Single Item Drug Screening:  How often have you used an illegal drug (including marijuana) or a prescription medication for non-medical reasons in the past year? never    Single Item Drug Screen Score: 0  Interpretation: Negative screen for possible drug use disorder    Brief Intervention  Alcohol & drug use screenings were reviewed  No concerns regarding substance use disorder identified  Physical Exam  Vitals and nursing note reviewed  Constitutional:       General: She is not in acute distress  Appearance: She is well-developed  She is not diaphoretic  HENT:      Head: Normocephalic and atraumatic        Right Ear: Hearing, tympanic membrane, ear canal and external ear normal       Left Ear: Hearing, tympanic membrane, ear canal and external ear normal       Nose: Nose normal  No mucosal edema or rhinorrhea  Mouth/Throat:      Pharynx: No oropharyngeal exudate or posterior oropharyngeal erythema  Eyes:      Extraocular Movements: Extraocular movements intact  Cardiovascular:      Rate and Rhythm: Normal rate and regular rhythm  Heart sounds: Normal heart sounds  No murmur heard  No friction rub  No gallop  Pulmonary:      Effort: Pulmonary effort is normal  No respiratory distress  Breath sounds: Normal breath sounds  No wheezing or rales  Musculoskeletal:         General: Normal range of motion  Cervical back: Normal range of motion and neck supple  Skin:     General: Skin is warm and dry  Findings: No rash  Neurological:      Mental Status: She is alert and oriented to person, place, and time  Psychiatric:         Behavior: Behavior normal          Thought Content:  Thought content normal          Judgment: Judgment normal            Sarah Barbosa PA-C

## 2021-07-07 NOTE — PATIENT INSTRUCTIONS
Medicare Preventive Visit Patient Instructions  Thank you for completing your Welcome to Medicare Visit or Medicare Annual Wellness Visit today  Your next wellness visit will be due in one year (7/8/2022)  The screening/preventive services that you may require over the next 5-10 years are detailed below  Some tests may not apply to you based off risk factors and/or age  Screening tests ordered at today's visit but not completed yet may show as past due  Also, please note that scanned in results may not display below  Preventive Screenings:  Service Recommendations Previous Testing/Comments   Colorectal Cancer Screening  * Colonoscopy    * Fecal Occult Blood Test (FOBT)/Fecal Immunochemical Test (FIT)  * Fecal DNA/Cologuard Test  * Flexible Sigmoidoscopy Age: 54-65 years old   Colonoscopy: every 10 years (may be performed more frequently if at higher risk)  OR  FOBT/FIT: every 1 year  OR  Cologuard: every 3 years  OR  Sigmoidoscopy: every 5 years  Screening may be recommended earlier than age 48 if at higher risk for colorectal cancer  Also, an individualized decision between you and your healthcare provider will decide whether screening between the ages of 74-80 would be appropriate  Colonoscopy: 08/07/2018  FOBT/FIT: Not on file  Cologuard: Not on file  Sigmoidoscopy: Not on file    Screening Current     Breast Cancer Screening Age: 36 years old  Frequency: every 1-2 years  Not required if history of left and right mastectomy Mammogram: 12/11/2020    Screening Current   Cervical Cancer Screening Between the ages of 21-29, pap smear recommended once every 3 years  Between the ages of 33-67, can perform pap smear with HPV co-testing every 5 years     Recommendations may differ for women with a history of total hysterectomy, cervical cancer, or abnormal pap smears in past  Pap Smear: Not on file    Screening Not Indicated   Hepatitis C Screening Once for adults born between 1945 and 1965  More frequently in patients at high risk for Hepatitis C Hep C Antibody: 12/12/2016    Screening Current   Diabetes Screening 1-2 times per year if you're at risk for diabetes or have pre-diabetes Fasting glucose: No results in last 5 years   A1C: No results in last 5 years        Cholesterol Screening Once every 5 years if you don't have a lipid disorder  May order more often based on risk factors  Lipid panel: Not on file          Other Preventive Screenings Covered by Medicare:  1  Abdominal Aortic Aneurysm (AAA) Screening: covered once if your at risk  You're considered to be at risk if you have a family history of AAA  2  Lung Cancer Screening: covers low dose CT scan once per year if you meet all of the following conditions: (1) Age 50-69; (2) No signs or symptoms of lung cancer; (3) Current smoker or have quit smoking within the last 15 years; (4) You have a tobacco smoking history of at least 30 pack years (packs per day multiplied by number of years you smoked); (5) You get a written order from a healthcare provider  3  Glaucoma Screening: covered annually if you're considered high risk: (1) You have diabetes OR (2) Family history of glaucoma OR (3)  aged 48 and older OR (3)  American aged 72 and older  3  Osteoporosis Screening: covered every 2 years if you meet one of the following conditions: (1) You're estrogen deficient and at risk for osteoporosis based off medical history and other findings; (2) Have a vertebral abnormality; (3) On glucocorticoid therapy for more than 3 months; (4) Have primary hyperparathyroidism; (5) On osteoporosis medications and need to assess response to drug therapy  · Last bone density test (DXA Scan): 10/21/2020   5  HIV Screening: covered annually if you're between the age of 15-65  Also covered annually if you are younger than 13 and older than 72 with risk factors for HIV infection   For pregnant patients, it is covered up to 3 times per pregnancy  Immunizations:  Immunization Recommendations   Influenza Vaccine Annual influenza vaccination during flu season is recommended for all persons aged >= 6 months who do not have contraindications   Pneumococcal Vaccine (Prevnar and Pneumovax)  * Prevnar = PCV13  * Pneumovax = PPSV23   Adults 25-60 years old: 1-3 doses may be recommended based on certain risk factors  Adults 72 years old: Prevnar (PCV13) vaccine recommended followed by Pneumovax (PPSV23) vaccine  If already received PPSV23 since turning 65, then PCV13 recommended at least one year after PPSV23 dose  Hepatitis B Vaccine 3 dose series if at intermediate or high risk (ex: diabetes, end stage renal disease, liver disease)   Tetanus (Td) Vaccine - COST NOT COVERED BY MEDICARE PART B Following completion of primary series, a booster dose should be given every 10 years to maintain immunity against tetanus  Td may also be given as tetanus wound prophylaxis  Tdap Vaccine - COST NOT COVERED BY MEDICARE PART B Recommended at least once for all adults  For pregnant patients, recommended with each pregnancy  Shingles Vaccine (Shingrix) - COST NOT COVERED BY MEDICARE PART B  2 shot series recommended in those aged 48 and above     Health Maintenance Due:      Topic Date Due    MAMMOGRAM  12/11/2021    DXA SCAN  10/21/2022    Colorectal Cancer Screening  08/07/2023    Hepatitis C Screening  Completed     Immunizations Due:      Topic Date Due    COVID-19 Vaccine (1) Never done    DTaP,Tdap,and Td Vaccines (1 - Tdap) Never done    Pneumococcal Vaccine: 65+ Years (1 of 1 - PPSV23) Never done     Advance Directives   What are advance directives? Advance directives are legal documents that state your wishes and plans for medical care  These plans are made ahead of time in case you lose your ability to make decisions for yourself   Advance directives can apply to any medical decision, such as the treatments you want, and if you want to donate organs  What are the types of advance directives? There are many types of advance directives, and each state has rules about how to use them  You may choose a combination of any of the following:  · Living will: This is a written record of the treatment you want  You can also choose which treatments you do not want, which to limit, and which to stop at a certain time  This includes surgery, medicine, IV fluid, and tube feedings  · Durable power of  for healthcare [facility]): This is a written record that states who you want to make healthcare choices for you when you are unable to make them for yourself  This person, called a proxy, is usually a family member or a friend  You may choose more than 1 proxy  · Do not resuscitate (DNR) order:  A DNR order is used in case your heart stops beating or you stop breathing  It is a request not to have certain forms of treatment, such as CPR  A DNR order may be included in other types of advance directives  · Medical directive: This covers the care that you want if you are in a coma, near death, or unable to make decisions for yourself  You can list the treatments you want for each condition  Treatment may include pain medicine, surgery, blood transfusions, dialysis, IV or tube feedings, and a ventilator (breathing machine)  · Values history: This document has questions about your views, beliefs, and how you feel and think about life  This information can help others choose the care that you would choose  Why are advance directives important? An advance directive helps you control your care  Although spoken wishes may be used, it is better to have your wishes written down  Spoken wishes can be misunderstood, or not followed  Treatments may be given even if you do not want them  An advance directive may make it easier for your family to make difficult choices about your care        © Copyright [employer] 2018 Information is for End User's use only and may not be sold, redistributed or otherwise used for commercial purposes   All illustrations and images included in CareNotes® are the copyrighted property of A D A M , Inc  or [facility] Padmaja Pfeiffer

## 2021-07-18 ENCOUNTER — LAB (OUTPATIENT)
Dept: LAB | Facility: HOSPITAL | Age: 66
End: 2021-07-18
Attending: FAMILY MEDICINE
Payer: MEDICARE

## 2021-07-18 ENCOUNTER — HOSPITAL ENCOUNTER (OUTPATIENT)
Dept: RADIOLOGY | Facility: HOSPITAL | Age: 66
Discharge: HOME | End: 2021-07-18
Attending: FAMILY MEDICINE
Payer: MEDICARE

## 2021-07-18 DIAGNOSIS — G43.709 CHRONIC MIGRAINE WITHOUT AURA WITHOUT STATUS MIGRAINOSUS, NOT INTRACTABLE: ICD-10-CM

## 2021-07-18 DIAGNOSIS — Z00.00 MEDICARE ANNUAL WELLNESS VISIT, SUBSEQUENT: ICD-10-CM

## 2021-07-18 DIAGNOSIS — D86.9 SARCOIDOSIS: ICD-10-CM

## 2021-07-18 LAB
ALBUMIN SERPL BCP-MCNC: 3.6 G/DL (ref 3.5–5)
ALP SERPL-CCNC: 49 U/L (ref 46–116)
ALT SERPL W P-5'-P-CCNC: 34 U/L (ref 12–78)
ANION GAP SERPL CALCULATED.3IONS-SCNC: 7 MMOL/L (ref 4–13)
AST SERPL W P-5'-P-CCNC: 24 U/L (ref 5–45)
BILIRUB DIRECT SERPL-MCNC: 0.14 MG/DL (ref 0–0.2)
BILIRUB SERPL-MCNC: 0.46 MG/DL (ref 0.2–1)
BUN SERPL-MCNC: 23 MG/DL (ref 5–25)
CALCIUM SERPL-MCNC: 8.6 MG/DL (ref 8.3–10.1)
CHLORIDE SERPL-SCNC: 107 MMOL/L (ref 100–108)
CHOLEST SERPL-MCNC: 194 MG/DL (ref 50–200)
CO2 SERPL-SCNC: 26 MMOL/L (ref 21–32)
CREAT SERPL-MCNC: 1.01 MG/DL (ref 0.6–1.3)
GFR SERPL CREATININE-BSD FRML MDRD: 58 ML/MIN/1.73SQ M
GLUCOSE P FAST SERPL-MCNC: 97 MG/DL (ref 65–99)
HDLC SERPL-MCNC: 92 MG/DL
LDLC SERPL CALC-MCNC: 85 MG/DL (ref 0–100)
NONHDLC SERPL-MCNC: 102 MG/DL
POTASSIUM SERPL-SCNC: 4 MMOL/L (ref 3.5–5.3)
PROT SERPL-MCNC: 6.6 G/DL (ref 6.4–8.2)
SODIUM SERPL-SCNC: 140 MMOL/L (ref 136–145)
TRIGL SERPL-MCNC: 84 MG/DL

## 2021-07-18 PROCEDURE — 71046 X-RAY EXAM CHEST 2 VIEWS: CPT

## 2021-07-18 PROCEDURE — 80061 LIPID PANEL: CPT

## 2021-07-18 PROCEDURE — 82248 BILIRUBIN DIRECT: CPT | Performed by: FAMILY MEDICINE

## 2021-07-18 PROCEDURE — 80053 COMPREHEN METABOLIC PANEL: CPT

## 2021-07-18 PROCEDURE — 36415 COLL VENOUS BLD VENIPUNCTURE: CPT

## 2021-10-19 ENCOUNTER — EVALUATION (OUTPATIENT)
Dept: PHYSICAL THERAPY | Facility: CLINIC | Age: 66
End: 2021-10-19
Payer: MEDICARE

## 2021-10-19 DIAGNOSIS — M54.51 VERTEBROGENIC LOW BACK PAIN: Primary | ICD-10-CM

## 2021-10-19 PROCEDURE — 97140 MANUAL THERAPY 1/> REGIONS: CPT | Performed by: PHYSICAL THERAPIST

## 2021-10-19 PROCEDURE — 97110 THERAPEUTIC EXERCISES: CPT | Performed by: PHYSICAL THERAPIST

## 2021-10-19 PROCEDURE — 97161 PT EVAL LOW COMPLEX 20 MIN: CPT | Performed by: PHYSICAL THERAPIST

## 2021-10-26 ENCOUNTER — OFFICE VISIT (OUTPATIENT)
Dept: PHYSICAL THERAPY | Facility: CLINIC | Age: 66
End: 2021-10-26
Payer: MEDICARE

## 2021-10-26 DIAGNOSIS — M54.51 VERTEBROGENIC LOW BACK PAIN: Primary | ICD-10-CM

## 2021-10-26 PROCEDURE — 97140 MANUAL THERAPY 1/> REGIONS: CPT

## 2021-10-26 PROCEDURE — 97110 THERAPEUTIC EXERCISES: CPT

## 2021-10-26 PROCEDURE — 97112 NEUROMUSCULAR REEDUCATION: CPT

## 2021-12-08 ENCOUNTER — HOSPITAL ENCOUNTER (OUTPATIENT)
Dept: MAMMOGRAPHY | Facility: HOSPITAL | Age: 66
Discharge: HOME | End: 2021-12-08
Attending: FAMILY MEDICINE
Payer: MEDICARE

## 2021-12-08 VITALS — BODY MASS INDEX: 19.15 KG/M2 | WEIGHT: 126.32 LBS | HEIGHT: 68 IN

## 2021-12-08 DIAGNOSIS — Z12.31 ENCOUNTER FOR SCREENING MAMMOGRAM FOR BREAST CANCER: ICD-10-CM

## 2021-12-08 PROCEDURE — 77067 SCR MAMMO BI INCL CAD: CPT

## 2021-12-08 PROCEDURE — 77063 BREAST TOMOSYNTHESIS BI: CPT

## 2022-03-09 ENCOUNTER — TELEPHONE (OUTPATIENT)
Dept: FAMILY MEDICINE CLINIC | Facility: CLINIC | Age: 67
End: 2022-03-09

## 2022-03-09 DIAGNOSIS — G89.29 OTHER CHRONIC PAIN: Primary | ICD-10-CM

## 2022-03-09 RX ORDER — CELECOXIB 200 MG/1
200 CAPSULE ORAL 2 TIMES DAILY
Qty: 30 CAPSULE | Refills: 0 | Status: SHIPPED | OUTPATIENT
Start: 2022-03-09 | End: 2022-07-26

## 2022-03-09 NOTE — TELEPHONE ENCOUNTER
Dr Juanita Stark started her on celecoxib 200mg daily and is going to run short, until she gets into pain management    Requesting a script be sent to [facility] pharmacy

## 2022-03-21 ENCOUNTER — CONSULT (OUTPATIENT)
Dept: PAIN MEDICINE | Facility: MEDICAL CENTER | Age: 67
End: 2022-03-21
Payer: MEDICARE

## 2022-03-21 VITALS
WEIGHT: 124 LBS | DIASTOLIC BLOOD PRESSURE: 72 MMHG | HEIGHT: 68 IN | BODY MASS INDEX: 18.79 KG/M2 | SYSTOLIC BLOOD PRESSURE: 126 MMHG | OXYGEN SATURATION: 97 % | HEART RATE: 73 BPM

## 2022-03-21 DIAGNOSIS — M47.816 LUMBAR SPONDYLOSIS: ICD-10-CM

## 2022-03-21 DIAGNOSIS — M48.061 SPINAL STENOSIS OF LUMBAR REGION WITHOUT NEUROGENIC CLAUDICATION: ICD-10-CM

## 2022-03-21 DIAGNOSIS — N18.30 STAGE 3 CHRONIC KIDNEY DISEASE, UNSPECIFIED WHETHER STAGE 3A OR 3B CKD (HCC): ICD-10-CM

## 2022-03-21 DIAGNOSIS — M46.1 SACROILIITIS (HCC): Primary | ICD-10-CM

## 2022-03-21 DIAGNOSIS — M54.16 LUMBAR RADICULITIS: ICD-10-CM

## 2022-03-21 PROCEDURE — 99204 OFFICE O/P NEW MOD 45 MIN: CPT | Performed by: PHYSICAL MEDICINE & REHABILITATION

## 2022-03-21 RX ORDER — FLUTICASONE PROPIONATE 50 MCG
SPRAY, SUSPENSION (ML) NASAL
COMMUNITY
Start: 2022-03-04 | End: 2022-07-08

## 2022-03-21 NOTE — PROGRESS NOTES
Assessment  1  [facility]    2  Spinal stenosis of lumbar region without neurogenic claudication    3  Lumbar radiculitis    4  Lumbar spondylosis    5  Stage 3 chronic kidney disease, unspecified whether stage 3a or 3b CKD ([facility] )        Plan  1  I did have an in depth conversation with the patient today  Clearly, the patient's pain has persisted despite time, relative rest, activity modification as well as therapy  The patient's examination and symptoms would suggest an underlying sacroiliac joint etiology  I would recommend proceeding with right intra-articular sacroiliac joint injection under fluoroscopic guidance  The injection will serve both diagnostic and hopefully therapeutic roles for the patient  In the office today, we reviewed the nature of sacroiliac joint pathology in depth using a spine model  We discussed the approach we would use for the sacroiliac joint injection and provided literature for home review  Complete risks and benefits including bleeding, infection, tissue reaction, allergic reaction were reviewed and verbal and written consent was obtained  2  The patient does also have pain in the axial lumbar spine especially with lumbar extension  This is consistent lumbar spondylosis in the patient had a radiofrequency ablation by Dr Nakia Hidalgo this past September  If this continues to be problematic for her would recommend repeating lumbar medial branch nerve blocks and potentially radiofrequency ablation  3  We did discuss the option of additional oral analgesics  She would prefer to avoid any prescription pain medications that may lead to polypharmacy  She prefers to avoid any potential side effects associated with these medications as well  That is fine  My impressions and treatment recommendations were discussed in detail with the patient who verbalized understanding and had no further questions  Discharge instructions were provided   I personally saw and examined the patient and I agree with the above discussed plan of care  Orders Placed This Encounter   Procedures    FL spine and pain procedure     Standing Status:   Future     Standing Expiration Date:   3/21/2023     Order Specific Question:   Reason for Exam:     Answer:   (R) SIJ injection     Order Specific Question:   Anticoagulant hold needed? Answer:   no     New Medications Ordered This Visit   Medications    fluticasone (FLONASE) 50 mcg/act nasal spray       History of Present Illness    Svitlana Verduzco is a 75 y o  female seen in consultation at the request of Dr Clif Peterson regarding chronic pain complaints  Patient has been experiencing pain in the back and into the right front thigh  Her back and radiating leg pain has been chronic and she has seen Dr Orquidea Driver for this as well  She is currently describing pain that is moderate to severe intensity rated as an 8/10 which is nearly constant without any typical pattern  She describes pain in the right front of her leg as though someone "beat the muscles"  She denies any recent trauma  Aggravating factors include lying down, bending, sitting  Alleviating factors include walking and exercise  Diagnostic studies include MRI of the lumbar spine  She did bring her CD with the images which were reviewed today  Please see report for full details  She has tried injection therapies in the past with excellent relief, moderate relief from exercise and chiropractic manipulation no relief from heat or ice application  Social history negative for tobacco marijuana and alcohol use  She did try celecoxib but prefers to avoid chronic use of analgesics  I have personally reviewed and/or updated the patient's past medical history, past surgical history, family history, social history, current medications, allergies, and vital signs today  Review of Systems   Constitutional: Negative for fever and unexpected weight change     HENT: Negative for trouble swallowing  Eyes: Positive for pain  Negative for visual disturbance  Respiratory: Negative for shortness of breath and wheezing  Cardiovascular: Negative for chest pain and palpitations  Gastrointestinal: Negative for constipation, diarrhea, nausea and vomiting  Endocrine: Negative for cold intolerance, heat intolerance and polydipsia  Genitourinary: Negative for difficulty urinating and frequency  Musculoskeletal: Positive for back pain, gait problem and myalgias  Negative for arthralgias and joint swelling  Skin: Negative for rash  Neurological: Negative for dizziness, seizures, syncope, weakness and headaches  Hematological: Does not bruise/bleed easily  Psychiatric/Behavioral: Positive for behavioral problems  Negative for dysphoric mood  The patient is nervous/anxious  All other systems reviewed and are negative        Patient Active Problem List   Diagnosis    Sarcoidosis       Past Medical History:   Diagnosis Date    Arthritis     Back pain     [facility] )     Depression     GERD (gastroesophageal reflux disease)     Hypertension     last assessed: 06/29/12    Sarcoidosis     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       Past Surgical History:   Procedure Laterality Date    BILATERAL OOPHORECTOMY      BLADDER SURGERY      CATARACT EXTRACTION, BILATERAL      COLPORRHAPHY      Anterior, repair of cystocele    HYSTERECTOMY      at age 43    OOPHORECTOMY Bilateral     at age 43   Clifm Canelo RETINAL DETACHMENT SURGERY Left        Family History   Problem Relation Age of Onset    Aneurysm Mother         Abd aorta aneurysm repair 2 dock limbs w/ visc extension pros    Hypertension Father     Subarachnoid hemorrhage Father     No Known Problems Daughter     No Known Problems Maternal Grandmother     No Known Problems Maternal Grandfather     No Known Problems Paternal Grandmother     No Known Problems Paternal Grandfather     No Known Problems Paternal Aunt        Social History     Occupational History    Not on file   Tobacco Use    Smoking status: Never Smoker    Smokeless tobacco: Never Used   Vaping Use    Vaping Use: Never used   Substance and Sexual Activity    Alcohol use: No    Drug use: No    Sexual activity: Yes     Partners: Male     Birth control/protection: Post-menopausal       Current Outpatient Medications on File Prior to Visit   Medication Sig    calcium citrate-vitamin D (CITRACAL+D) 315-200 MG-UNIT per tablet Take 2 tablets by mouth Twice daily OsteoMatrix    carboxymethylcellulose (REFRESH TEARS) 0 5 % SOLN Apply to eye    celecoxib (CeleBREX) 200 mg capsule Take 1 capsule (200 mg total) by mouth 2 (two) times a day    Cholecalciferol (VITAMIN D3) 1000 units CAPS Take by mouth    fluticasone (FLONASE) 50 mcg/act nasal spray     hyoscyamine (LEVBID) 0 375 mg 12 hr tablet Take 1 tablet (0 375 mg total) by mouth 2 (two) times a day    ibuprofen (ADVIL) 200 mg tablet     loperamide (Anti-Diarrheal) 2 MG tablet TAKE ONE TABLET BY MOUTH IN THE MORNING AND ONE-HALF TABLET IN THE EVENING    metoprolol succinate (TOPROL XL) 50 mg 24 hr tablet Take 0 5 tablets by mouth 2 (two) times a day    Multiple Vitamin (DAILY VALUE MULTIVITAMIN PO) Take by mouth    Omega-3 Fatty Acids (OMEGA-3 FISH OIL) 1200 MG CAPS [employer] OmegaGuard fish oil    ondansetron (ZOFRAN) 4 mg tablet Take 1 tablet (4 mg total) by mouth every 8 (eight) hours as needed for nausea or vomiting    sertraline (ZOLOFT) 50 mg tablet Take 1 tablet (50 mg total) by mouth daily    topiramate (TOPAMAX) 25 mg tablet Take 1 tablet (25 mg total) by mouth daily    trandolapril (MAVIK) 2 MG tablet Take 1 tablet by mouth daily     No current facility-administered medications on file prior to visit         Allergies   Allergen Reactions    Tramadol Itching    Carvedilol Fever    Dust Mite Extract     Influenza Virus Vaccine Visual Disturbance     Reaction Date: 28Feb2012;     Molds & Smuts     Juan Grass Pollen Allergen     Tree Extract        Physical Exam    /72   Pulse 73   Ht 5' 8" (1 727 m)   Wt 56 2 kg (124 lb)   SpO2 97%   BMI 18 85 kg/m²     LUMBAR  General: Well-developed, well-nourished individual in no acute distress  Mental: Appropriate mood and affect  Grossly oriented with coherent speech and thought processing   Neuro:  Cranial nerves: Cranial nerve function is grossly intact bilaterally   Strength: Bilateral lower extremity strength is normal and symmetric   No atrophy or tone abnormalities noted   Reflexes: Bilateral lower extremity muscle stretch reflexes are physiologic and symmetric   No ankle clonus is noted   Sensation: No loss of sensation is noted   SLR/Foraminal Compression Maneuvers: Straight leg raising in the sitting position is negative for radicular pain   Gait:  Gait/gross motor: Gait is normal  Station is normal  Toe walking, heel walking  are normal     Musculoskeletal:  Palpation: Inspection and palpation of the spine and extremities are unremarkable except for tenderness to palpation along the axial lumbar facet joints and right sacroiliac joint, she also has some mild tenderness along the greater trochanters bilaterally reproducing her pain complaints  Spine: Normal pain-free range of motion except for lumbar extension which is limited in reproduces pain  No gross axial skeletal deformities   Skin: Skin inspection grossly negative for erythema, breakdown, or concerning lesions in affected area   Lymph: No lymphadenopathy is appreciated in the involved extremity   Vessels: No lower extremity edema   Lungs: Breathing is comfortable and regular  No dyspnea noted during examination   Eyes: Visual field grossly intact to confrontation  No redness appreciated  ENT: No craniofacial deformities or asymmetry  No neck masses appreciated           Imaging

## 2022-03-21 NOTE — PATIENT INSTRUCTIONS
Sacroiliitis   WHAT YOU NEED TO KNOW:   Sacroiliitis is a painful swelling of one or both of your sacroiliac joints that lasts at least 3 months  The sacroiliac joint connects your pelvis to the base of your spine  DISCHARGE INSTRUCTIONS:   Medicines:  Ask for more information about these and other medicines you may need to treat sacroiliitis:  · Pain medicine: You may be given medicine to take away or decrease pain  Do not wait until the pain is severe before you take your medicine  You may be given the medicine as a pill to swallow or as a lotion that you put on the painful area  · NSAIDs  help decrease swelling and pain or fever  This medicine is available with or without a doctor's order  NSAIDs can cause stomach bleeding or kidney problems in certain people  If you take blood thinner medicine, always ask your healthcare provider if NSAIDs are safe for you  Always read the medicine label and follow directions  · Muscle relaxers  help decrease pain and muscle spasms  · Take your medicine as directed  Contact your healthcare provider if you think your medicine is not helping or if you have side effects  Tell him of her if you are allergic to any medicine  Keep a list of the medicines, vitamins, and herbs you take  Include the amounts, and when and why you take them  Bring the list or the pill bottles to follow-up visits  Carry your medicine list with you in case of an emergency  Physical therapy:  Your healthcare provider may suggest physical therapy  Your physical therapist may teach you exercises to improve posture (the way you stand and sit), flexibility, and strength in your lower back  He may also teach you how to remain safely active and avoid further injury  Follow the exercise plan given to you by your physical therapist   Rest:  Follow your healthcare provider's instructions about how much rest you should get  Avoid activity that worsens your pain    Ice or heat packs:  Use ice or heat packs on the sore area of your body to decrease the pain and swelling  Put ice in a plastic bag covered with a towel on your low back  Cover heated items with a towel to avoid burns  Use ice and heat as directed  Follow up with your healthcare provider or spine specialist within 1 to 2 weeks:  Write down your questions so you remember to ask them during your visits  Contact your healthcare provider or spine specialist if:   · Your pain makes it hard for you to do your daily activities, such as work or school  · Your pain does not go away after treatment  · You feel depressed or anxious  · You have questions about your condition or care  Return to the emergency department if:   · You have a fever  · Your pain is worse than before  · Your pain prevents you from sleeping  © Copyright [employer] 2022 Information is for End User's use only and may not be sold, redistributed or otherwise used for commercial purposes  All illustrations and images included in CareNotes® are the copyrighted property of A D A M , Inc  or [facility] Padmaja Medina   The above information is an  only  It is not intended as medical advice for individual conditions or treatments  Talk to your doctor, nurse or pharmacist before following any medical regimen to see if it is safe and effective for you

## 2022-04-11 ENCOUNTER — HOSPITAL ENCOUNTER (OUTPATIENT)
Dept: RADIOLOGY | Facility: MEDICAL CENTER | Age: 67
Discharge: HOME | End: 2022-04-11
Attending: PHYSICAL MEDICINE & REHABILITATION
Payer: MEDICARE

## 2022-04-11 VITALS
SYSTOLIC BLOOD PRESSURE: 125 MMHG | HEART RATE: 79 BPM | DIASTOLIC BLOOD PRESSURE: 66 MMHG | RESPIRATION RATE: 16 BRPM | OXYGEN SATURATION: 96 % | TEMPERATURE: 98.3 F

## 2022-04-11 DIAGNOSIS — M46.1 SACROILIITIS (HCC): ICD-10-CM

## 2022-04-11 PROCEDURE — 27096 INJECT SACROILIAC JOINT: CPT | Performed by: PHYSICAL MEDICINE & REHABILITATION

## 2022-04-11 RX ORDER — METHYLPREDNISOLONE ACETATE 40 MG/ML
40 INJECTION, SUSPENSION INTRA-ARTICULAR; INTRALESIONAL; INTRAMUSCULAR; PARENTERAL; SOFT TISSUE ONCE
Status: COMPLETED | OUTPATIENT
Start: 2022-04-11 | End: 2022-04-11

## 2022-04-11 RX ORDER — BUPIVACAINE HCL/PF 2.5 MG/ML
1.5 VIAL (ML) INJECTION ONCE
Status: COMPLETED | OUTPATIENT
Start: 2022-04-11 | End: 2022-04-11

## 2022-04-11 RX ADMIN — METHYLPREDNISOLONE ACETATE 40 MG: 40 INJECTION, SUSPENSION INTRA-ARTICULAR; INTRALESIONAL; INTRAMUSCULAR; PARENTERAL; SOFT TISSUE at 13:56

## 2022-04-11 RX ADMIN — IOHEXOL 0.5 ML: 300 INJECTION, SOLUTION INTRAVENOUS at 13:55

## 2022-04-11 RX ADMIN — Medication 1.5 ML: at 13:56

## 2022-04-11 NOTE — H&P
History of Present Illness:  The patient is a 75 y o  female who presents with complaints of right low back pain    Patient Active Problem List   Diagnosis    Sarcoidosis       Past Medical History:   Diagnosis Date    Arthritis     Back pain     Bipolar disorder ([address] )     Depression     GERD (gastroesophageal reflux disease)     Hypertension     last assessed: 06/29/12    Sarcoidosis     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       Past Surgical History:   Procedure Laterality Date    BILATERAL OOPHORECTOMY      BLADDER SURGERY      CATARACT EXTRACTION, BILATERAL      COLPORRHAPHY      Anterior, repair of cystocele    HYSTERECTOMY      at age 43    OOPHORECTOMY Bilateral     at age 43   [facility] RETINAL DETACHMENT SURGERY Left          Current Outpatient Medications:     calcium citrate-vitamin D (CITRACAL+D) 315-200 MG-UNIT per tablet, Take 2 tablets by mouth Twice daily OsteoMatrix, Disp: , Rfl:     carboxymethylcellulose (REFRESH TEARS) 0 5 % SOLN, Apply to eye, Disp: , Rfl:     celecoxib (CeleBREX) 200 mg capsule, Take 1 capsule (200 mg total) by mouth 2 (two) times a day, Disp: 30 capsule, Rfl: 0    Cholecalciferol (VITAMIN D3) 1000 units CAPS, Take by mouth, Disp: , Rfl:     fluticasone (FLONASE) 50 mcg/act nasal spray, , Disp: , Rfl:     hyoscyamine (LEVBID) 0 375 mg 12 hr tablet, Take 1 tablet (0 375 mg total) by mouth 2 (two) times a day, Disp: 180 tablet, Rfl: 4    ibuprofen (ADVIL) 200 mg tablet, , Disp: , Rfl:     loperamide (Anti-Diarrheal) 2 MG tablet, TAKE ONE TABLET BY MOUTH IN THE MORNING AND ONE-HALF TABLET IN THE EVENING, Disp: 144 tablet, Rfl: 5    metoprolol succinate (TOPROL XL) 50 mg 24 hr tablet, Take 0 5 tablets by mouth 2 (two) times a day, Disp: , Rfl:     Multiple Vitamin (DAILY VALUE MULTIVITAMIN PO), Take by mouth, Disp: , Rfl:     Omega-3 Fatty Acids (OMEGA-3 FISH OIL) 1200 MG CAPS, [employer] fish oil, Disp: , Rfl:     ondansetron (ZOFRAN) 4 mg tablet, Take 1 tablet (4 mg total) by mouth every 8 (eight) hours as needed for nausea or vomiting, Disp: 30 tablet, Rfl: 3    sertraline (ZOLOFT) 50 mg tablet, Take 1 tablet (50 mg total) by mouth daily, Disp: 90 tablet, Rfl: 3    topiramate (TOPAMAX) 25 mg tablet, Take 1 tablet (25 mg total) by mouth daily, Disp: 90 tablet, Rfl: 3    trandolapril (Mavik) 2 MG tablet, Take 1 tablet by mouth daily  , Disp: , Rfl:     Current Facility-Administered Medications:     bupivacaine (PF) (MARCAINE) 0 25 % injection 1 5 mL, 1 5 mL, Intra-articular, Once, Gisela Records, DO    iohexol (OMNIPAQUE) 300 mg/mL injection 0 5 mL, 0 5 mL, Intra-articular, Once, Gisela Records, DO    methylPREDNISolone acetate (DEPO-MEDROL) injection 40 mg, 40 mg, Intra-articular, Once, Gisela Records, DO    Allergies   Allergen Reactions    Tramadol Itching    Carvedilol Fever    Dust Mite Extract     Influenza Virus Vaccine Visual Disturbance     Reaction Date: 28Feb2012;     Molds & Smuts     Juan Grass Pollen Allergen     Tree Extract        Physical Exam:   Vitals:    04/11/22 1350   BP: 119/67   Pulse: 69   Resp: 16   Temp: 98 3 °F (36 8 °C)   SpO2: 96%     General: Awake, Alert, Oriented x 3, Mood and affect appropriate  Respiratory: Respirations even and unlabored  Cardiovascular: Peripheral pulses intact; no edema  Musculoskeletal Exam: right low back pain    ASA Score: 2    Patient/Chart Verification  Patient ID Verified: Verbal  Consents Confirmed: Procedural,To be obtained in the Pre-Procedure area  H&P( within 30 days) Verified: To be obtained in the Pre-Procedure area  Allergies Reviewed: Yes  Anticoag/NSAID held?: NA  Currently on antibiotics?: No  Pregnancy denied?: NA    Assessment:   1   Sacroiliitis (HCC)        Plan: (R) SIJ injection

## 2022-04-11 NOTE — DISCHARGE INSTRUCTIONS

## 2022-04-18 ENCOUNTER — TELEPHONE (OUTPATIENT)
Dept: PAIN MEDICINE | Facility: CLINIC | Age: 67
End: 2022-04-18

## 2022-05-09 ENCOUNTER — OFFICE VISIT (OUTPATIENT)
Dept: PAIN MEDICINE | Facility: MEDICAL CENTER | Age: 67
End: 2022-05-09
Payer: MEDICARE

## 2022-05-09 VITALS
HEART RATE: 71 BPM | DIASTOLIC BLOOD PRESSURE: 68 MMHG | BODY MASS INDEX: 19.17 KG/M2 | HEIGHT: 68 IN | SYSTOLIC BLOOD PRESSURE: 108 MMHG | WEIGHT: 126.5 LBS

## 2022-05-09 DIAGNOSIS — M46.1 SACROILIITIS (HCC): Primary | ICD-10-CM

## 2022-05-09 PROCEDURE — 99213 OFFICE O/P EST LOW 20 MIN: CPT | Performed by: NURSE PRACTITIONER

## 2022-05-09 NOTE — PROGRESS NOTES
Assessment  1  Sac[facility]iliitis ([facility] )        Plan  I discussed with the patient that since there has been moderate  improvement in the pain symptoms that we will hold off on any repeat injections at this point in time  However, I reviewed with the patient that if her symptoms should return, worsen, and/or experience new pain symptoms, she should call our office  to discuss repeating the injection  Follow-up as needed      My impressions and treatment recommendations were discussed in detail with the patient who verbalized understanding and had no further questions  Discharge instructions were provided  I personally saw and examined the patient and I agree with the above discussed plan of care  No orders of the defined types were placed in this encounter  No orders of the defined types were placed in this encounter  History of Present Illness    Allie Laguna is a 75 y o  female presents for follow-up related to her chronic sacroiliac joint pain  Today she reports she is doing better rates her pain 4/10  She has intermittent pain throughout the entirety of the day described as sharp, throbbing, and shooting  Patient is status post right sacroiliac joint injection on April 11th 2022 and she is reporting 50% of ongoing relief  She continues to use 2 Tylenol morning and 2 Tylenol in the evening which is helpful for her  I have personally reviewed and/or updated the patient's past medical history, past surgical history, family history, social history, current medications, allergies, and vital signs today  Review of Systems   Musculoskeletal: Positive for back pain and myalgias  All other systems reviewed and are negative        Patient Active Problem List   Diagnosis    Sarcoidosis    Sacroiliitis [facility])       Past Medical History:   Diagnosis Date    Arthritis     Back pain     Bipolar disorder ([facility] )     Depression     GERD (gastroesophageal reflux disease)     Hypertension     last assessed: 06/29/12    Sarcoidosis     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       Past Surgical History:   Procedure Laterality Date    BILATERAL OOPHORECTOMY      BLADDER SURGERY      CATARACT EXTRACTION, BILATERAL      COLPORRHAPHY      Anterior, repair of cystocele    HYSTERECTOMY      at age 43    OOPHORECTOMY Bilateral     at age 43   [facility] RETINAL DETACHMENT SURGERY Left        Family History   Problem Relation Age of Onset    Aneurysm Mother         Abd aorta aneurysm repair 2 dock limbs w/ visc extension pros    Hypertension Father     Subarachnoid hemorrhage Father     No Known Problems Daughter     No Known Problems Maternal Grandmother     No Known Problems Maternal Grandfather     No Known Problems Paternal Grandmother     No Known Problems Paternal Grandfather     No Known Problems Paternal Aunt        Social History     Occupational History    Not on file   Tobacco Use    Smoking status: Never Smoker    Smokeless tobacco: Never Used   Vaping Use    Vaping Use: Never used   Substance and Sexual Activity    Alcohol use: No    Drug use: No    Sexual activity: Yes     Partners: Male     Birth control/protection: Post-menopausal       Current Outpatient Medications on File Prior to Visit   Medication Sig    calcium citrate-vitamin D (CITRACAL+D) 315-200 MG-UNIT per tablet Take 2 tablets by mouth Twice daily OsteoMatrix    carboxymethylcellulose (REFRESH TEARS) 0 5 % SOLN Apply to eye    celecoxib (CeleBREX) 200 mg capsule Take 1 capsule (200 mg total) by mouth 2 (two) times a day    Cholecalciferol (VITAMIN D3) 1000 units CAPS Take by mouth    fluticasone (FLONASE) 50 mcg/act nasal spray     hyoscyamine (LEVBID) 0 375 mg 12 hr tablet Take 1 tablet (0 375 mg total) by mouth 2 (two) times a day    ibuprofen (ADVIL) 200 mg tablet     loperamide (Anti-Diarrheal) 2 MG tablet TAKE ONE TABLET BY MOUTH IN THE MORNING AND ONE-HALF TABLET IN THE EVENING    metoprolol succinate (TOPROL XL) 50 mg 24 hr tablet Take 0 5 tablets by mouth 2 (two) times a day    Multiple Vitamin (DAILY VALUE MULTIVITAMIN PO) Take by mouth    Omega-3 Fatty Acids (OMEGA-3 FISH OIL) 1200 MG CAPS [employer] fish oil    ondansetron (ZOFRAN) 4 mg tablet Take 1 tablet (4 mg total) by mouth every 8 (eight) hours as needed for nausea or vomiting    sertraline (ZOLOFT) 50 mg tablet Take 1 tablet (50 mg total) by mouth daily    topiramate (TOPAMAX) 25 mg tablet Take 1 tablet (25 mg total) by mouth daily    trandolapril (Mavik) 2 MG tablet Take 1 tablet by mouth daily       No current facility-administered medications on file prior to visit  Allergies   Allergen Reactions    Tramadol Itching    Carvedilol Fever    Dust Mite Extract     Influenza Virus Vaccine Visual Disturbance     Reaction Date: 28Feb2012;     Molds & Smuts     Juan Grass Pollen Allergen     Tree Extract        Physical Exam    /68   Pulse 71   Ht 5' 8" (1 727 m)   Wt 57 4 kg (126 lb 8 oz)   BMI 19 23 kg/m²     Constitutional: normal, well developed, well nourished, alert, in no distress and non-toxic and no overt pain behavior    Eyes: anicteric  HEENT: grossly intact  Neck: supple, symmetric, trachea midline and no masses   Pulmonary:even and unlabored  Cardiovascular:No edema or pitting edema present  Skin:Normal without rashes or lesions and well hydrated  Psychiatric:Mood and affect appropriate  Neurologic:Cranial Nerves II-XII grossly intact  Musculoskeletal:normal    Imaging

## 2022-06-06 ENCOUNTER — TELEPHONE (OUTPATIENT)
Dept: OTOLARYNGOLOGY | Facility: CLINIC | Age: 67
End: 2022-06-06

## 2022-07-08 ENCOUNTER — OFFICE VISIT (OUTPATIENT)
Dept: FAMILY MEDICINE CLINIC | Facility: CLINIC | Age: 67
End: 2022-07-08
Payer: MEDICARE

## 2022-07-08 VITALS
SYSTOLIC BLOOD PRESSURE: 104 MMHG | TEMPERATURE: 98.4 F | WEIGHT: 125.2 LBS | BODY MASS INDEX: 18.97 KG/M2 | HEART RATE: 78 BPM | OXYGEN SATURATION: 97 % | HEIGHT: 68 IN | DIASTOLIC BLOOD PRESSURE: 58 MMHG

## 2022-07-08 DIAGNOSIS — G43.709 CHRONIC MIGRAINE WITHOUT AURA WITHOUT STATUS MIGRAINOSUS, NOT INTRACTABLE: ICD-10-CM

## 2022-07-08 DIAGNOSIS — K58.0 IRRITABLE BOWEL SYNDROME WITH DIARRHEA: ICD-10-CM

## 2022-07-08 DIAGNOSIS — Z78.0 POSTMENOPAUSAL: ICD-10-CM

## 2022-07-08 DIAGNOSIS — Z00.00 MEDICARE ANNUAL WELLNESS VISIT, SUBSEQUENT: Primary | ICD-10-CM

## 2022-07-08 PROCEDURE — G0439 PPPS, SUBSEQ VISIT: HCPCS | Performed by: PHYSICIAN ASSISTANT

## 2022-07-08 RX ORDER — HYOSCYAMINE SULFATE EXTENDED-RELEASE 0.38 MG/1
0.38 TABLET ORAL 2 TIMES DAILY
Qty: 180 TABLET | Refills: 3 | Status: SHIPPED | OUTPATIENT
Start: 2022-07-08

## 2022-07-08 RX ORDER — TOPIRAMATE 25 MG/1
25 TABLET ORAL DAILY
Qty: 90 TABLET | Refills: 3 | Status: SHIPPED | OUTPATIENT
Start: 2022-07-08 | End: 2022-09-14 | Stop reason: ALTCHOICE

## 2022-07-08 RX ORDER — LOPERAMIDE HYDROCHLORIDE 2 MG/1
TABLET ORAL
Qty: 144 TABLET | Refills: 3 | Status: SHIPPED | OUTPATIENT
Start: 2022-07-08

## 2022-07-08 NOTE — PATIENT INSTRUCTIONS
Medicare Preventive Visit Patient Instructions  Thank you for completing your Welcome to Medicare Visit or Medicare Annual Wellness Visit today  Your next wellness visit will be due in one year (7/9/2023)  The screening/preventive services that you may require over the next 5-10 years are detailed below  Some tests may not apply to you based off risk factors and/or age  Screening tests ordered at today's visit but not completed yet may show as past due  Also, please note that scanned in results may not display below  Preventive Screenings:  Service Recommendations Previous Testing/Comments   Colorectal Cancer Screening  * Colonoscopy    * Fecal Occult Blood Test (FOBT)/Fecal Immunochemical Test (FIT)  * Fecal DNA/Cologuard Test  * Flexible Sigmoidoscopy Age: 54-65 years old   Colonoscopy: every 10 years (may be performed more frequently if at higher risk)  OR  FOBT/FIT: every 1 year  OR  Cologuard: every 3 years  OR  Sigmoidoscopy: every 5 years  Screening may be recommended earlier than age 48 if at higher risk for colorectal cancer  Also, an individualized decision between you and your healthcare provider will decide whether screening between the ages of 74-80 would be appropriate  Colonoscopy: 08/07/2018  FOBT/FIT: Not on file  Cologuard: Not on file  Sigmoidoscopy: Not on file    Screening Current     Breast Cancer Screening Age: 36 years old  Frequency: every 1-2 years  Not required if history of left and right mastectomy Mammogram: 12/08/2021    Screening Current   Cervical Cancer Screening Between the ages of 21-29, pap smear recommended once every 3 years  Between the ages of 33-67, can perform pap smear with HPV co-testing every 5 years     Recommendations may differ for women with a history of total hysterectomy, cervical cancer, or abnormal pap smears in past  Pap Smear: Not on file    Screening Not Indicated   Hepatitis C Screening Once for adults born between 1945 and 1965  More frequently in patients at high risk for Hepatitis C Hep C Antibody: 12/12/2016    Screening Current   Diabetes Screening 1-2 times per year if you're at risk for diabetes or have pre-diabetes Fasting glucose: 97 mg/dL   A1C: No results in last 5 years    Screening Current   Cholesterol Screening Once every 5 years if you don't have a lipid disorder  May order more often based on risk factors  Lipid panel: 07/18/2021    Screening Current     Other Preventive Screenings Covered by Medicare:  1  Abdominal Aortic Aneurysm (AAA) Screening: covered once if your at risk  You're considered to be at risk if you have a family history of AAA  2  Lung Cancer Screening: covers low dose CT scan once per year if you meet all of the following conditions: (1) Age 50-69; (2) No signs or symptoms of lung cancer; (3) Current smoker or have quit smoking within the last 15 years; (4) You have a tobacco smoking history of at least 30 pack years (packs per day multiplied by number of years you smoked); (5) You get a written order from a healthcare provider  3  Glaucoma Screening: covered annually if you're considered high risk: (1) You have diabetes OR (2) Family history of glaucoma OR (3)  aged 48 and older OR (3)  American aged 72 and older  3  Osteoporosis Screening: covered every 2 years if you meet one of the following conditions: (1) You're estrogen deficient and at risk for osteoporosis based off medical history and other findings; (2) Have a vertebral abnormality; (3) On glucocorticoid therapy for more than 3 months; (4) Have primary hyperparathyroidism; (5) On osteoporosis medications and need to assess response to drug therapy  · Last bone density test (DXA Scan): 10/21/2020   5  HIV Screening: covered annually if you're between the age of 15-65  Also covered annually if you are younger than 13 and older than 72 with risk factors for HIV infection   For pregnant patients, it is covered up to 3 times per pregnancy  Immunizations:  Immunization Recommendations   Influenza Vaccine Annual influenza vaccination during flu season is recommended for all persons aged >= 6 months who do not have contraindications   Pneumococcal Vaccine (Prevnar and Pneumovax)  * Prevnar = PCV13  * Pneumovax = PPSV23   Adults 25-60 years old: 1-3 doses may be recommended based on certain risk factors  Adults 72 years old: Prevnar (PCV13) vaccine recommended followed by Pneumovax (PPSV23) vaccine  If already received PPSV23 since turning 65, then PCV13 recommended at least one year after PPSV23 dose  Hepatitis B Vaccine 3 dose series if at intermediate or high risk (ex: diabetes, end stage renal disease, liver disease)   Tetanus (Td) Vaccine - COST NOT COVERED BY MEDICARE PART B Following completion of primary series, a booster dose should be given every 10 years to maintain immunity against tetanus  Td may also be given as tetanus wound prophylaxis  Tdap Vaccine - COST NOT COVERED BY MEDICARE PART B Recommended at least once for all adults  For pregnant patients, recommended with each pregnancy  Shingles Vaccine (Shingrix) - COST NOT COVERED BY MEDICARE PART B  2 shot series recommended in those aged 48 and above     Health Maintenance Due:      Topic Date Due    DXA SCAN  10/21/2022    Breast Cancer Screening: Mammogram  12/08/2022    Colorectal Cancer Screening  08/07/2023    Hepatitis C Screening  Completed     Immunizations Due:      Topic Date Due    COVID-19 Vaccine (1) Never done    DTaP,Tdap,and Td Vaccines (1 - Tdap) Never done    Pneumococcal Vaccine: 65+ Years (1 - PCV) Never done     Advance Directives   What are advance directives? Advance directives are legal documents that state your wishes and plans for medical care  These plans are made ahead of time in case you lose your ability to make decisions for yourself   Advance directives can apply to any medical decision, such as the treatments you want, and if you want to donate organs  What are the types of advance directives? There are many types of advance directives, and each state has rules about how to use them  You may choose a combination of any of the following:  · Living will: This is a written record of the treatment you want  You can also choose which treatments you do not want, which to limit, and which to stop at a certain time  This includes surgery, medicine, IV fluid, and tube feedings  · Durable power of  for healthcare [facility] [place]): This is a written record that states who you want to make healthcare choices for you when you are unable to make them for yourself  This person, called a proxy, is usually a family member or a friend  You may choose more than 1 proxy  · Do not resuscitate (DNR) order:  A DNR order is used in case your heart stops beating or you stop breathing  It is a request not to have certain forms of treatment, such as CPR  A DNR order may be included in other types of advance directives  · Medical directive: This covers the care that you want if you are in a coma, near death, or unable to make decisions for yourself  You can list the treatments you want for each condition  Treatment may include pain medicine, surgery, blood transfusions, dialysis, IV or tube feedings, and a ventilator (breathing machine)  · Values history: This document has questions about your views, beliefs, and how you feel and think about life  This information can help others choose the care that you would choose  Why are advance directives important? An advance directive helps you control your care  Although spoken wishes may be used, it is better to have your wishes written down  Spoken wishes can be misunderstood, or not followed  Treatments may be given even if you do not want them  An advance directive may make it easier for your family to make difficult choices about your care        © Copyright [employer] 2018 Information is for End User's use only and may not be sold, redistributed or otherwise used for commercial purposes   All illustrations and images included in CareNotes® are the copyrighted property of A D A M , Inc  or [facility] Padmaja Pfeiffer

## 2022-07-08 NOTE — PROGRESS NOTES
Assessment and Plan:     Problem List Items Addressed This Visit    None     Visit Diagnoses     Medicare annual wellness visit, subsequent    -  Primary    Relevant Orders    Lipid panel    Comprehensive metabolic panel    Chronic migraine without aura without status migrainosus, not intractable        Relevant Medications    sertraline (ZOLOFT) 50 mg tablet    topiramate (TOPAMAX) 25 mg tablet    Irritable bowel syndrome with diarrhea        Relevant Medications    hyoscyamine (LEVBID) 0 375 mg 12 hr tablet    loperamide (Anti-Diarrheal) 2 MG tablet    Postmenopausal        Relevant Orders    DXA bone density spine hip and pelvis           Preventive health issues were discussed with patient, and age appropriate screening tests were ordered as noted in patient's After Visit Summary  Personalized health advice and appropriate referrals for health education or preventive services given if needed, as noted in patient's After Visit Summary  History of Present Illness:     Patient presents for a Medicare Wellness Visit    Dunia Salas is a pleasant 75year old female who is here today for a medicare well visit and medication refills  She is doing well on all of her medications and does not have any complaints  She is following yearly with her cardiologist  She is up to date with mammograms  She is due for a DXA scan and labs  She does not wish to have any immunizations  She denies any other concerns at this time  Patient Care Team:  Jam Granados DO as PCP - General  RANDALL Soria     Review of Systems:     Review of Systems   Constitutional: Negative for chills, diaphoresis, fatigue and fever  HENT: Negative for congestion, ear pain, postnasal drip, rhinorrhea, sneezing, sore throat and trouble swallowing  Eyes: Negative for pain and visual disturbance  Respiratory: Negative for apnea, cough, shortness of breath and wheezing  Cardiovascular: Negative for chest pain and palpitations  Gastrointestinal: Negative for abdominal pain, constipation, diarrhea, nausea and vomiting  Genitourinary: Negative for dysuria and hematuria  Musculoskeletal: Negative for arthralgias, gait problem and myalgias  Neurological: Negative for dizziness, syncope, weakness, light-headedness, numbness and headaches  Psychiatric/Behavioral: Negative for suicidal ideas  The patient is not nervous/anxious           Problem List:     Patient Active Problem List   Diagnosis    Sarcoidosis    Sacroiliitis [facility])      Past Medical and Surgical History:     Past Medical History:   Diagnosis Date    Arthritis     Back pain     Bipolar disorder ([facility] [address] )     Depression     GERD (gastroesophageal reflux disease)     Hypertension     last assessed: 06/29/12    Sarcoidosis     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     Past Surgical History:   Procedure Laterality Date    BILATERAL OOPHORECTOMY      BLADDER SURGERY      CATARACT EXTRACTION, BILATERAL      COLPORRHAPHY      Anterior, repair of cystocele    HYSTERECTOMY      at age 43    OOPHORECTOMY Bilateral     at age 43   Neli Snowden RETINAL DETACHMENT SURGERY Left       Family History:     Family History   Problem Relation Age of Onset    Aneurysm Mother         Abd aorta aneurysm repair 2 dock limbs w/ visc extension pros    Hypertension Father     Subarachnoid hemorrhage Father     No Known Problems Daughter     No Known Problems Maternal Grandmother     No Known Problems Maternal Grandfather     No Known Problems Paternal Grandmother     No Known Problems Paternal Grandfather     No Known Problems Paternal Aunt       Social History:     Social History     Socioeconomic History    Marital status: /Civil Union     Spouse name: None    Number of children: None    Years of education: None    Highest education level: None   Occupational History    None   Tobacco Use    Smoking status: Never Smoker    Smokeless tobacco: Never Used   Vaping Use    Vaping Use: Never used   Substance and Sexual Activity    Alcohol use: No    Drug use: No    Sexual activity: Yes     Partners: Male     Birth control/protection: Post-menopausal   Other Topics Concern    None   Social History Narrative    None     Social Determinants of Health     Financial Resource Strain: Not on file   Food Insecurity: Not on file   Transportation Needs: Not on file   Physical Activity: Not on file   Stress: Not on file   Social Connections: Not on file   Intimate Partner Violence: Not on file   Housing Stability: Not on file      Medications and Allergies:     Current Outpatient Medications   Medication Sig Dispense Refill    calcium citrate-vitamin D (CITRACAL+D) 315-200 MG-UNIT per tablet Take 2 tablets by mouth Twice daily OsteoMatrix      carboxymethylcellulose (REFRESH PLUS) 0 5 % SOLN Apply to eye      celecoxib (CeleBREX) 200 mg capsule Take 1 capsule (200 mg total) by mouth 2 (two) times a day 30 capsule 0    hyoscyamine (LEVBID) 0 375 mg 12 hr tablet Take 1 tablet (0 375 mg total) by mouth 2 (two) times a day 180 tablet 3    ibuprofen (MOTRIN) 200 mg tablet       loperamide (Anti-Diarrheal) 2 MG tablet TAKE ONE TABLET BY MOUTH IN THE MORNING AND ONE-HALF TABLET IN THE EVENING 144 tablet 3    metoprolol succinate (TOPROL-XL) 50 mg 24 hr tablet Take 0 5 tablets by mouth 2 (two) times a day      Multiple Vitamin (DAILY VALUE MULTIVITAMIN PO) Take by mouth      Omega-3 Fatty Acids (OMEGA-3 FISH OIL) 1200 MG CAPS [employer] Omega[employer] fish oil      ondansetron (ZOFRAN) 4 mg tablet Take 1 tablet (4 mg total) by mouth every 8 (eight) hours as needed for nausea or vomiting 30 tablet 3    sertraline (ZOLOFT) 50 mg tablet Take 1 tablet (50 mg total) by mouth daily 90 tablet 3    topiramate (TOPAMAX) 25 mg tablet Take 1 tablet (25 mg total) by mouth daily 90 tablet 3    trandolapril (Mavik) 2 MG tablet Take 1 tablet by mouth daily        Cholecalciferol (VITAMIN D3) 1000 units CAPS Take by mouth       No current facility-administered medications for this visit  Allergies   Allergen Reactions    Tramadol Itching    Carvedilol Fever    Dust Mite Extract     Influenza Virus Vaccine Visual Disturbance     Reaction Date: 28Feb2012;     Molds & Smuts     Juan Grass Pollen Allergen     Tree Extract       Immunizations: There is no immunization history for the selected administration types on file for this patient  Health Maintenance:         Topic Date Due    DXA SCAN  10/21/2022    Breast Cancer Screening: Mammogram  12/08/2022    Colorectal Cancer Screening  08/07/2023    Hepatitis C Screening  Completed         Topic Date Due    COVID-19 Vaccine (1) Never done    DTaP,Tdap,and Td Vaccines (1 - Tdap) Never done    Pneumococcal Vaccine: 65+ Years (1 - PCV) Never done      Medicare Screening Tests and Risk Assessments:     Samy Cartagena is here for her Subsequent Wellness visit  Health Risk Assessment:   Patient rates overall health as good  Patient feels that their physical health rating is slightly better  Patient is satisfied with their life  Eyesight was rated as same  Hearing was rated as same  Patient feels that their emotional and mental health rating is same  Patients states they are never, rarely angry  Patient states they are sometimes unusually tired/fatigued  Pain experienced in the last 7 days has been some  Patient's pain rating has been 5/10  Patient states that she has experienced no weight loss or gain in last 6 months  Depression Screening:   PHQ-2 Score: 0      Fall Risk Screening: In the past year, patient has experienced: no history of falling in past year      Urinary Incontinence Screening:   Patient has not leaked urine accidently in the last six months  Home Safety:  Patient has trouble with stairs inside or outside of their home  Patient has working smoke alarms and has no working carbon monoxide detector   Home safety hazards include: none  Nutrition:   Current diet is Regular  Medications:   Patient is currently taking over-the-counter supplements  OTC medications include: Vitamins and fish oil  Patient is able to manage medications  Activities of Daily Living (ADLs)/Instrumental Activities of Daily Living (IADLs):   Walk and transfer into and out of bed and chair?: Yes  Dress and groom yourself?: Yes    Bathe or shower yourself?: Yes    Feed yourself?  Yes  Do your laundry/housekeeping?: Yes  Manage your money, pay your bills and track your expenses?: Yes  Make your own meals?: Yes    Do your own shopping?: Yes    Previous Hospitalizations:   Any hospitalizations or ED visits within the last 12 months?: No      Advance Care Planning:   Living will: No    Durable POA for healthcare: No    Advanced directive: No      Cognitive Screening:   Provider or family/friend/caregiver concerned regarding cognition?: No    PREVENTIVE SCREENINGS      Cardiovascular Screening:    General: Screening Current and Risks and Benefits Discussed    Due for: Lipid Panel      Diabetes Screening:     General: Screening Current and Risks and Benefits Discussed    Due for: Blood Glucose      Colorectal Cancer Screening:     General: Screening Current      Breast Cancer Screening:     General: Screening Current and Risks and Benefits Discussed      Cervical Cancer Screening:    General: Screening Not Indicated and Risks and Benefits Discussed      Osteoporosis Screening:    General: Screening Current and Risks and Benefits Discussed    Due for: DXA Axial      Abdominal Aortic Aneurysm (AAA) Screening:    Risk factors include: family history of AAA        General: Screening Current      Lung Cancer Screening:     General: Screening Not Indicated and Risks and Benefits Discussed      Hepatitis C Screening:    General: Screening Current and Risks and Benefits Discussed    Screening, Brief Intervention, and Referral to Treatment (SBIRT)    Screening  Typical number of drinks in a day: 0  Typical number of drinks in a week: 0  Interpretation: Low risk drinking behavior  AUDIT-C Screenin) How often did you have a drink containing alcohol in the past year? never  2) How many drinks did you have on a typical day when you were drinking in the past year? 0  3) How often did you have 6 or more drinks on one occasion in the past year? never    AUDIT-C Score: 0  Interpretation: Score 0-2 (female): Negative screen for alcohol misuse    Single Item Drug Screening:  How often have you used an illegal drug (including marijuana) or a prescription medication for non-medical reasons in the past year? never    Single Item Drug Screen Score: 0  Interpretation: Negative screen for possible drug use disorder    Brief Intervention  Alcohol & drug use screenings were reviewed  No concerns regarding substance use disorder identified  Other Counseling Topics:   Car/seat belt/driving safety, skin self-exam, sunscreen and calcium and vitamin D intake and regular weightbearing exercise  No exam data present     Physical Exam:     /58   Pulse 78   Temp 98 4 °F (36 9 °C)   Ht 5' 8" (1 727 m)   Wt 56 8 kg (125 lb 3 2 oz)   SpO2 97%   BMI 19 04 kg/m²     Physical Exam  Vitals and nursing note reviewed  Constitutional:       General: She is not in acute distress  Appearance: She is well-developed  She is not diaphoretic  HENT:      Head: Normocephalic and atraumatic  Right Ear: Hearing, tympanic membrane, ear canal and external ear normal       Left Ear: Hearing, tympanic membrane, ear canal and external ear normal       Nose: Nose normal  No mucosal edema or rhinorrhea  Mouth/Throat:      Pharynx: No oropharyngeal exudate or posterior oropharyngeal erythema  Eyes:      Extraocular Movements: Extraocular movements intact  Cardiovascular:      Rate and Rhythm: Normal rate and regular rhythm        Heart sounds: Normal heart sounds  No murmur heard  No friction rub  No gallop  Pulmonary:      Effort: Pulmonary effort is normal  No respiratory distress  Breath sounds: Normal breath sounds  No wheezing or rales  Musculoskeletal:         General: Normal range of motion  Cervical back: Normal range of motion and neck supple  Lymphadenopathy:      Cervical: No cervical adenopathy  Skin:     General: Skin is warm and dry  Findings: No rash  Neurological:      Mental Status: She is alert and oriented to person, place, and time  Psychiatric:         Behavior: Behavior normal          Thought Content:  Thought content normal          Judgment: Judgment normal           Garrett Morfin PA-C

## 2022-07-16 ENCOUNTER — APPOINTMENT (OUTPATIENT)
Dept: LAB | Facility: MEDICAL CENTER | Age: 67
End: 2022-07-16
Payer: MEDICARE

## 2022-07-16 DIAGNOSIS — Z00.00 MEDICARE ANNUAL WELLNESS VISIT, SUBSEQUENT: ICD-10-CM

## 2022-07-16 LAB
ALBUMIN SERPL BCP-MCNC: 3.6 G/DL (ref 3.5–5)
ALP SERPL-CCNC: 48 U/L (ref 46–116)
ALT SERPL W P-5'-P-CCNC: 41 U/L (ref 12–78)
ANION GAP SERPL CALCULATED.3IONS-SCNC: 6 MMOL/L (ref 4–13)
AST SERPL W P-5'-P-CCNC: 27 U/L (ref 5–45)
BILIRUB SERPL-MCNC: 0.43 MG/DL (ref 0.2–1)
BUN SERPL-MCNC: 23 MG/DL (ref 5–25)
CALCIUM SERPL-MCNC: 9 MG/DL (ref 8.3–10.1)
CHLORIDE SERPL-SCNC: 110 MMOL/L (ref 100–108)
CHOLEST SERPL-MCNC: 191 MG/DL
CO2 SERPL-SCNC: 24 MMOL/L (ref 21–32)
CREAT SERPL-MCNC: 1.04 MG/DL (ref 0.6–1.3)
GFR SERPL CREATININE-BSD FRML MDRD: 55 ML/MIN/1.73SQ M
GLUCOSE P FAST SERPL-MCNC: 103 MG/DL (ref 65–99)
HDLC SERPL-MCNC: 90 MG/DL
LDLC SERPL CALC-MCNC: 79 MG/DL (ref 0–100)
NONHDLC SERPL-MCNC: 101 MG/DL
POTASSIUM SERPL-SCNC: 4.5 MMOL/L (ref 3.5–5.3)
PROT SERPL-MCNC: 6.8 G/DL (ref 6.4–8.2)
SODIUM SERPL-SCNC: 140 MMOL/L (ref 136–145)
TRIGL SERPL-MCNC: 112 MG/DL

## 2022-07-16 PROCEDURE — 80053 COMPREHEN METABOLIC PANEL: CPT

## 2022-07-16 PROCEDURE — 80061 LIPID PANEL: CPT

## 2022-07-21 ENCOUNTER — TELEPHONE (OUTPATIENT)
Dept: FAMILY MEDICINE CLINIC | Facility: CLINIC | Age: 67
End: 2022-07-21

## 2022-07-21 DIAGNOSIS — F41.9 ANXIETY: Primary | ICD-10-CM

## 2022-07-21 RX ORDER — LORAZEPAM 0.5 MG/1
TABLET ORAL
Qty: 5 TABLET | Refills: 0 | Status: SHIPPED | OUTPATIENT
Start: 2022-07-21 | End: 2022-07-26 | Stop reason: SDUPTHER

## 2022-07-21 NOTE — TELEPHONE ENCOUNTER
She has an appt with you next week for severe anxiety and insomnia  She tried herbal remedies and it did not work    Would like to try lorazepam, a few tablets, [facility] Ora Lee

## 2022-07-26 ENCOUNTER — OFFICE VISIT (OUTPATIENT)
Dept: FAMILY MEDICINE CLINIC | Facility: CLINIC | Age: 67
End: 2022-07-26
Payer: MEDICARE

## 2022-07-26 VITALS
OXYGEN SATURATION: 99 % | DIASTOLIC BLOOD PRESSURE: 62 MMHG | HEIGHT: 68 IN | HEART RATE: 88 BPM | TEMPERATURE: 97.6 F | SYSTOLIC BLOOD PRESSURE: 118 MMHG | WEIGHT: 121.6 LBS | BODY MASS INDEX: 18.43 KG/M2

## 2022-07-26 DIAGNOSIS — F41.9 ANXIETY: ICD-10-CM

## 2022-07-26 DIAGNOSIS — E44.0 MODERATE PROTEIN-CALORIE MALNUTRITION (HCC): ICD-10-CM

## 2022-07-26 DIAGNOSIS — I42.0 PRIMARY DILATED CARDIOMYOPATHY (HCC): ICD-10-CM

## 2022-07-26 DIAGNOSIS — F43.10 POSTTRAUMATIC STRESS DISORDER: Primary | ICD-10-CM

## 2022-07-26 DIAGNOSIS — F31.81 BIPOLAR 2 DISORDER (HCC): ICD-10-CM

## 2022-07-26 PROBLEM — E46 PROTEIN CALORIE MALNUTRITION (HCC): Status: ACTIVE | Noted: 2022-07-26

## 2022-07-26 PROCEDURE — 99214 OFFICE O/P EST MOD 30 MIN: CPT | Performed by: FAMILY MEDICINE

## 2022-07-26 RX ORDER — LORAZEPAM 0.5 MG/1
TABLET ORAL
Qty: 60 TABLET | Refills: 0 | Status: SHIPPED | OUTPATIENT
Start: 2022-07-26 | End: 2022-08-10 | Stop reason: SDUPTHER

## 2022-07-26 RX ORDER — SERTRALINE HYDROCHLORIDE 100 MG/1
100 TABLET, FILM COATED ORAL DAILY
Qty: 30 TABLET | Refills: 3 | Status: SHIPPED | OUTPATIENT
Start: 2022-07-26 | End: 2022-08-29

## 2022-07-26 RX ORDER — MOMETASONE FUROATE 50 UG/1
SPRAY, METERED NASAL
COMMUNITY
Start: 2022-07-13

## 2022-07-26 NOTE — PROGRESS NOTES
BMI Counseling: Body mass index is 18 49 kg/m²  The BMI is below normal  Patient advised to gain weight and dietary education for weight gain was provided  Rationale for BMI follow-up plan is due to patient being underweight  Assessment/Plan:  Patient is sertraline will be increased to 100 mg and her lorazepam will be increased to twice daily at least in the near future she is going to searched through her insurance company for in panel did psychiatrist or cycle our psychologist that she can get some counseling with and she will use a nutritional supplement try to put a little weight back on were going to see her back in about 1 month    Problem List Items Addressed This Visit        Other    Posttraumatic stress disorder - Primary    Protein calorie malnutrition ([address] )           Diagnoses and all orders for this visit:    Posttraumatic stress disorder    Moderate protein-calorie malnutrition ([address] )    Other orders  -     mometasone (NASONEX) 50 mcg/act nasal spray        No problem-specific Assessment & Plan notes found for this encounter  Subjective:      Patient ID: Sotero Joy is a 75 y o  female  Mrs   Prices here she is accompanied by her best friend she has had some issues with what sounds like either vertebrobasilar insufficiency or possibly a form of benign paroxysmal positional vertigo she also is quite anxious over her 's health status he had a scare with a cardiac condition but now is doing quite well but she is worried about him on a constant basis if he leaves the house she feels very uncomfortable and very anxious she has been taking low doses of lorazepam and low doses of sertraline without relief she is not currently in any formal counseling she also has a low BMI 18 49 she has issues with pain in her mucous membranes of her mouth and also TMJ dysfunction so her eating habits are difficult because of the inability to chew and tolerate certain foods in her mouth      The following portions of the patient's history were reviewed and updated as appropriate:   She has a past medical history of Arthritis, Back pain, Bipolar disorder ([address] ), Depression, GERD (gastroesophageal reflux disease), Hypertension, Sarcoidosis, and TMJ disease (05/2017)  ,  does not have any pertinent problems on file  ,   has a past surgical history that includes Colporrhaphy; Bladder surgery; Cataract extraction, bilateral; Bilateral oophorectomy; Retinal detachment surgery (Left); Hysterectomy; and Oophorectomy (Bilateral)  ,  family history includes Aneurysm in her mother; Hypertension in her father; No Known Problems in her daughter, maternal grandfather, maternal grandmother, paternal aunt, paternal grandfather, and paternal grandmother; Subarachnoid hemorrhage in her father  ,   reports that she has never smoked  She has never used smokeless tobacco  She reports that she does not drink alcohol and does not use drugs  ,  is allergic to tramadol, carvedilol, dust mite extract, influenza virus vaccine, molds & smuts, aileen grass pollen allergen, and tree extract     Current Outpatient Medications   Medication Sig Dispense Refill    calcium citrate-vitamin D (CITRACAL+D) 315-200 MG-UNIT per tablet Take 2 tablets by mouth Twice daily OsteoMatrix      carboxymethylcellulose (REFRESH PLUS) 0 5 % SOLN Apply to eye      Cholecalciferol (VITAMIN D3) 1000 units CAPS Take by mouth      hyoscyamine (LEVBID) 0 375 mg 12 hr tablet Take 1 tablet (0 375 mg total) by mouth 2 (two) times a day 180 tablet 3    loperamide (Anti-Diarrheal) 2 MG tablet TAKE ONE TABLET BY MOUTH IN THE MORNING AND ONE-HALF TABLET IN THE EVENING 144 tablet 3    LORazepam (Ativan) 0 5 mg tablet 1 tablet daily if needed for anxiety 5 tablet 0    metoprolol succinate (TOPROL-XL) 50 mg 24 hr tablet Take 0 5 tablets by mouth 2 (two) times a day      Multiple Vitamin (DAILY VALUE MULTIVITAMIN PO) Take by mouth      Omega-3 Fatty Acids (OMEGA-3 FISH OIL) 1200 MG CAPS [facility] OmegaGuard fish oil      ondansetron (ZOFRAN) 4 mg tablet Take 1 tablet (4 mg total) by mouth every 8 (eight) hours as needed for nausea or vomiting 30 tablet 3    sertraline (ZOLOFT) 50 mg tablet Take 1 tablet (50 mg total) by mouth daily 90 tablet 3    topiramate (TOPAMAX) 25 mg tablet Take 1 tablet (25 mg total) by mouth daily 90 tablet 3    trandolapril (Mavik) 2 MG tablet Take 1 tablet by mouth daily        mometasone (NASONEX) 50 mcg/act nasal spray        No current facility-administered medications for this visit  Review of Systems   Constitutional: Positive for activity change  Negative for appetite change, diaphoresis, fatigue and fever  HENT: Positive for dental problem  Eyes: Positive for visual disturbance  Patient wears corrective lenses patient has had retinal tears in the left eye which have been repaired her doing well   Respiratory: Negative for apnea, cough, chest tightness, shortness of breath and wheezing  Cardiovascular: Negative for chest pain, palpitations and leg swelling  Gastrointestinal: Negative for abdominal distention, abdominal pain, anal bleeding, constipation, diarrhea, nausea and vomiting  Endocrine: Negative for cold intolerance, heat intolerance, polydipsia, polyphagia and polyuria  Genitourinary: Negative for difficulty urinating, dysuria, flank pain, hematuria and urgency  Musculoskeletal: Negative for arthralgias, back pain, gait problem, joint swelling and myalgias  Skin: Negative for color change, rash and wound  Allergic/Immunologic: Negative for environmental allergies, food allergies and immunocompromised state  Neurological: Negative for dizziness, seizures, syncope, speech difficulty, numbness and headaches  Hematological: Negative for adenopathy  Does not bruise/bleed easily  Psychiatric/Behavioral: Negative for agitation, behavioral problems, hallucinations, sleep disturbance and suicidal ideas   The patient is nervous/anxious  Objective:  Vitals:    07/26/22 0834   BP: 118/62   BP Location: Right arm   Patient Position: Sitting   Cuff Size: Standard   Pulse: 88   Temp: 97 6 °F (36 4 °C)   TempSrc: Temporal   SpO2: 99%   Weight: 55 2 kg (121 lb 9 6 oz)   Height: 5' 8" (1 727 m)     Body mass index is 18 49 kg/m²  Physical Exam  Constitutional:       General: She is not in acute distress  Appearance: She is well-developed  She is not diaphoretic  HENT:      Head: Normocephalic  Right Ear: External ear normal       Left Ear: External ear normal       Nose: Nose normal    Eyes:      General: No scleral icterus  Right eye: No discharge  Left eye: No discharge  Conjunctiva/sclera: Conjunctivae normal       Pupils: Pupils are equal, round, and reactive to light  Neck:      Thyroid: No thyromegaly  Trachea: No tracheal deviation  Cardiovascular:      Rate and Rhythm: Normal rate and regular rhythm  Heart sounds: Normal heart sounds  No murmur heard  No friction rub  No gallop  Pulmonary:      Effort: Pulmonary effort is normal  No respiratory distress  Breath sounds: Normal breath sounds  No wheezing  Abdominal:      General: Bowel sounds are normal       Palpations: Abdomen is soft  There is no mass  Tenderness: There is no abdominal tenderness  There is no guarding  Musculoskeletal:         General: No deformity  Cervical back: Normal range of motion  Lymphadenopathy:      Cervical: No cervical adenopathy  Skin:     General: Skin is warm and dry  Findings: No erythema or rash  Neurological:      Mental Status: She is alert and oriented to person, place, and time  Cranial Nerves: No cranial nerve deficit  Psychiatric:         Thought Content:  Thought content normal

## 2022-08-03 ENCOUNTER — TELEPHONE (OUTPATIENT)
Dept: FAMILY MEDICINE CLINIC | Facility: CLINIC | Age: 67
End: 2022-08-03

## 2022-08-03 NOTE — TELEPHONE ENCOUNTER
So on to options for [facility] if she is having trouble finding a therapist she can go to the Behavioral emergency room at the [facility] now [facility] they will have a therapist on duty who will evaluate her and then they will get her hooked up with someone for follow-up the other option would be to call [phone] and they to will have a therapist on the phone that can help get her to a therapist for acute care

## 2022-08-03 NOTE — TELEPHONE ENCOUNTER
Rx'd Lorazepam Si BID - No relief - Started    CC: Anxiety     S/S Shakes, jumpy, very anxious    For a few days things were improving & now things are going back to where they were    Looking for a Therapist - Dentist appt today - please L/M    Drinks 2 c coffee / day morning - evening

## 2022-08-04 ENCOUNTER — TELEPHONE (OUTPATIENT)
Dept: FAMILY MEDICINE CLINIC | Facility: CLINIC | Age: 67
End: 2022-08-04

## 2022-08-04 NOTE — TELEPHONE ENCOUNTER
No relief from Lorazepam - Asking if Dr would be able to increase dose or change Rx to something else - took the last tablet this morning - No relief    CC: Heart racing, jittery internally, leg weakness, inability to concentrate,

## 2022-08-04 NOTE — TELEPHONE ENCOUNTER
If patient is still having severe symptoms of anxiety I would increase the lorazepam to 1 mg twice daily

## 2022-08-10 DIAGNOSIS — F41.9 ANXIETY: ICD-10-CM

## 2022-08-11 DIAGNOSIS — F41.9 ANXIETY: ICD-10-CM

## 2022-08-11 RX ORDER — LORAZEPAM 1 MG/1
1 TABLET ORAL 2 TIMES DAILY
Qty: 60 TABLET | Refills: 0 | Status: SHIPPED | OUTPATIENT
Start: 2022-08-11 | End: 2022-08-29

## 2022-08-11 RX ORDER — LORAZEPAM 0.5 MG/1
TABLET ORAL
Qty: 60 TABLET | Refills: 0 | Status: SHIPPED | OUTPATIENT
Start: 2022-08-11 | End: 2022-08-11 | Stop reason: SDUPTHER

## 2022-08-29 ENCOUNTER — OFFICE VISIT (OUTPATIENT)
Dept: FAMILY MEDICINE CLINIC | Facility: CLINIC | Age: 67
End: 2022-08-29
Payer: MEDICARE

## 2022-08-29 ENCOUNTER — SOCIAL WORK (OUTPATIENT)
Dept: BEHAVIORAL/MENTAL HEALTH CLINIC | Facility: CLINIC | Age: 67
End: 2022-08-29
Payer: MEDICARE

## 2022-08-29 ENCOUNTER — OFFICE VISIT (OUTPATIENT)
Dept: PSYCHIATRY | Facility: CLINIC | Age: 67
End: 2022-08-29
Payer: MEDICARE

## 2022-08-29 VITALS
SYSTOLIC BLOOD PRESSURE: 106 MMHG | TEMPERATURE: 98.4 F | HEIGHT: 68 IN | WEIGHT: 124 LBS | DIASTOLIC BLOOD PRESSURE: 56 MMHG | BODY MASS INDEX: 18.79 KG/M2 | HEART RATE: 84 BPM | OXYGEN SATURATION: 99 %

## 2022-08-29 DIAGNOSIS — F33.1 MDD (MAJOR DEPRESSIVE DISORDER), RECURRENT EPISODE, MODERATE (HCC): ICD-10-CM

## 2022-08-29 DIAGNOSIS — F43.10 POSTTRAUMATIC STRESS DISORDER: Primary | ICD-10-CM

## 2022-08-29 DIAGNOSIS — F41.9 ANXIETY: ICD-10-CM

## 2022-08-29 DIAGNOSIS — F43.10 POSTTRAUMATIC STRESS DISORDER: ICD-10-CM

## 2022-08-29 DIAGNOSIS — F43.10 PTSD (POST-TRAUMATIC STRESS DISORDER): Primary | ICD-10-CM

## 2022-08-29 DIAGNOSIS — F41.9 ANXIETY: Primary | ICD-10-CM

## 2022-08-29 PROCEDURE — 99214 OFFICE O/P EST MOD 30 MIN: CPT | Performed by: FAMILY MEDICINE

## 2022-08-29 PROCEDURE — 90792 PSYCH DIAG EVAL W/MED SRVCS: CPT | Performed by: STUDENT IN AN ORGANIZED HEALTH CARE EDUCATION/TRAINING PROGRAM

## 2022-08-29 PROCEDURE — H2021 COM WRAP-AROUND SV, 15 MIN: HCPCS

## 2022-08-29 RX ORDER — BUSPIRONE HYDROCHLORIDE 5 MG/1
5 TABLET ORAL 3 TIMES DAILY
Qty: 45 TABLET | Refills: 0 | Status: SHIPPED | OUTPATIENT
Start: 2022-08-29 | End: 2022-09-14 | Stop reason: ALTCHOICE

## 2022-08-29 RX ORDER — VITAMIN B COMPLEX
2 CAPSULE ORAL DAILY
COMMUNITY

## 2022-08-29 RX ORDER — CARBOXYMETHYLCELLULOSE SODIUM 10 MG/ML
GEL OPHTHALMIC
COMMUNITY

## 2022-08-29 RX ORDER — SERTRALINE HYDROCHLORIDE 100 MG/1
150 TABLET, FILM COATED ORAL DAILY
Qty: 30 TABLET | Refills: 3 | Status: SHIPPED | OUTPATIENT
Start: 2022-08-29 | End: 2022-10-03 | Stop reason: SDUPTHER

## 2022-08-29 RX ORDER — LORAZEPAM 1 MG/1
0.5 TABLET ORAL EVERY 8 HOURS
Qty: 60 TABLET | Refills: 0 | Status: SHIPPED | OUTPATIENT
Start: 2022-08-29 | End: 2022-09-14 | Stop reason: ALTCHOICE

## 2022-08-29 RX ORDER — HYPROMELLOSES AND CARBOXYMETHYLCELLULOSE SODIUM 3; 2.5 MG/ML; MG/ML
GEL OPHTHALMIC
COMMUNITY
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
## 2022-08-29 NOTE — BH TREATMENT PLAN
TREATMENT PLAN (Medication Management Only)        [facility]    Name and Date of Birth:  Jade Pittman 75 y o  1955  Date of Treatment Plan: August 29, 2022  Diagnosis/Diagnoses:    1  PTSD (post-traumatic stress disorder)    2  MDD (major depressive disorder), recurrent episode, moderate ([facility] [address] )    3  Anxiety      Strengths/Personal Resources for Self-Care: supportive family, taking medications as prescribed, ability to adapt to life changes, ability to communicate well, ability to listen, ability to reason, ability to understand psychiatric illness, average or above intelligence, general fund of knowledge, good physical health, good understanding of illness, motivation for treatment, Judaism affiliation, being resoureceful, self-reliance, strong pankaj, well educated, willingness to work on problems, work skills  Area/Areas of need (in own words): anxiety symptoms, depressive symptoms  1  Long Term Goal: improve anxiety  Target Date:6 months - 2/28/2023  Person/Persons responsible for completion of goal: Elida  2  Short Term Objective (s) - How will we reach this goal?:   A  Provider new recommended medication/dosage changes and/or continue medication(s): continue current medications as prescribed Aubree Martínez  Attend psychotherapy regularly  Isiah Bamberger all scheduled appointments  Target Date:6 months - 2/28/2023  Person/Persons Responsible for Completion of Goal: Elida  Progress Towards Goals: starting treatment  Treatment Modality: medication management every 2 week  Review due 180 days from date of this plan: 6 months - 2/28/2023  Expected length of service: maintenance  My Physician/PA/NP and I have developed this plan together and I agree to work on the goals and objectives  I understand the treatment goals that were developed for my treatment

## 2022-08-30 ENCOUNTER — TELEPHONE (OUTPATIENT)
Dept: FAMILY MEDICINE CLINIC | Facility: CLINIC | Age: 67
End: 2022-08-30

## 2022-08-30 NOTE — TELEPHONE ENCOUNTER
Called to thank you for all your help yesterday - Pt was able to see someone in [facility] [facility] medication changes & working on setting up Pt with Trauma Therapist    Pt so appreciates all you did for her & doesn't know where she would have been without you

## 2022-08-31 ENCOUNTER — TELEPHONE (OUTPATIENT)
Dept: PSYCHIATRY | Facility: CLINIC | Age: 67
End: 2022-08-31

## 2022-08-31 NOTE — TELEPHONE ENCOUNTER
reached out to pt but call disconnected and said " the person youre trying to reach is busy on another call"

## 2022-09-14 ENCOUNTER — TELEPHONE (OUTPATIENT)
Dept: PSYCHIATRY | Facility: CLINIC | Age: 67
End: 2022-09-14

## 2022-09-14 ENCOUNTER — OFFICE VISIT (OUTPATIENT)
Dept: PSYCHIATRY | Facility: CLINIC | Age: 67
End: 2022-09-14
Payer: MEDICARE

## 2022-09-14 DIAGNOSIS — F41.9 ANXIETY: Primary | ICD-10-CM

## 2022-09-14 DIAGNOSIS — F43.10 PTSD (POST-TRAUMATIC STRESS DISORDER): ICD-10-CM

## 2022-09-14 PROCEDURE — 99214 OFFICE O/P EST MOD 30 MIN: CPT | Performed by: STUDENT IN AN ORGANIZED HEALTH CARE EDUCATION/TRAINING PROGRAM

## 2022-09-14 PROCEDURE — 90833 PSYTX W PT W E/M 30 MIN: CPT | Performed by: STUDENT IN AN ORGANIZED HEALTH CARE EDUCATION/TRAINING PROGRAM

## 2022-09-14 RX ORDER — BUSPIRONE HYDROCHLORIDE 10 MG/1
10 TABLET ORAL 3 TIMES DAILY
Qty: 120 TABLET | Refills: 0 | Status: SHIPPED | OUTPATIENT
Start: 2022-09-14 | End: 2022-10-19 | Stop reason: ALTCHOICE

## 2022-09-14 RX ORDER — LORAZEPAM 0.5 MG/1
TABLET ORAL
Qty: 38 TABLET | Refills: 0 | Status: SHIPPED | OUTPATIENT
Start: 2022-09-14 | End: 2022-10-07 | Stop reason: SDUPTHER

## 2022-09-14 RX ORDER — CLONIDINE HYDROCHLORIDE 0.1 MG/1
0.1 TABLET ORAL 3 TIMES DAILY PRN
Qty: 90 TABLET | Refills: 0 | Status: SHIPPED | OUTPATIENT
Start: 2022-09-14 | End: 2022-10-19

## 2022-09-14 NOTE — PSYCH
Subjective:  Patient arrived today to office with her  for scheduled 2 week follow-up visit  Insert last visit we increased her Zoloft from 100 mg to 150 mg, increased BuSpar frequency to 3 times a day 5 mg each, decrease Ativan from 2 mg per day down to 1 mg per day currently  Patient reports compliance with her medications and denies side effects  She reports having anxiety symptoms and shaking while decreasing her Ativan dose  However she looks less depressed than last visit and has better control of her emotions overall  She is eating and sleeping better and her  reports 3 lb weight gain since last visit  Her  helps her by providing supportive psychotherapy time, is talking to her in a positive manner and helping her stay busy over the day to help with her anxiety  She needs to get out of the house more and go back to her previous of is and she mentioned used to go outside with her camera and take pictures and photos  She did not receive any calls so far from [address] [place] [address] for trauma therapy  She denies any suicidal or homicidal ideations, denies any auditory visual hallucinations she feels safe at home and denies access to guns or weapons     Appointment on 07/16/2022   Component Date Value Ref Range Status    Cholesterol 07/16/2022 191  See Comment mg/dL Final    Cholesterol:         Pediatric <18 Years        Desirable          <170 mg/dL      Borderline High    170-199 mg/dL      High               >=200 mg/dL        Adult >=18 Years            Desirable         <200 mg/dL      Borderline High   200-239 mg/dL      High              >239 mg/dL      Triglycerides 07/16/2022 112  See Comment mg/dL Final    Triglyceride:     0-9Y            <75mg/dL     10Y-17Y         <90 mg/dL       >=18Y     Normal          <150 mg/dL     Borderline High 150-199 mg/dL     High            200-499 mg/dL        Very High       >499 mg/dL    Specimen collection should occur prior to N-Acetylcysteine or Metamizole administration due to the potential for falsely depressed results   HDL, Direct 07/16/2022 90  >=50 mg/dL Final    Specimen collection should occur prior to Metamizole administration due to the potential for falsley depressed results   LDL Calculated 07/16/2022 79  0 - 100 mg/dL Final    LDL Cholesterol:     Optimal           <100 mg/dl     Near Optimal      100-129 mg/dl     Above Optimal       Borderline High 130-159 mg/dl       High            160-189 mg/dl       Very High       >189 mg/dl         This screening LDL is a calculated result  It does not have the accuracy of the Direct Measured LDL in the monitoring of patients with hyperlipidemia and/or statin therapy  Direct Measure LDL (NGU881) must be ordered separately in these patients   Non-HDL-Chol (CHOL-HDL) 07/16/2022 101  mg/dl Final    Sodium 07/16/2022 140  136 - 145 mmol/L Final    Potassium 07/16/2022 4 5  3 5 - 5 3 mmol/L Final    Chloride 07/16/2022 110 (A) 100 - 108 mmol/L Final    CO2 07/16/2022 24  21 - 32 mmol/L Final    ANION GAP 07/16/2022 6  4 - 13 mmol/L Final    BUN 07/16/2022 23  5 - 25 mg/dL Final    Creatinine 07/16/2022 1 04  0 60 - 1 30 mg/dL Final    Standardized to IDMS reference method    Glucose, Fasting 07/16/2022 103 (A) 65 - 99 mg/dL Final    Specimen collection should occur prior to Sulfasalazine administration due to the potential for falsely depressed results  Specimen collection should occur prior to Sulfapyridine administration due to the potential for falsely elevated results   Calcium 07/16/2022 9 0  8 3 - 10 1 mg/dL Final    AST 07/16/2022 27  5 - 45 U/L Final    Specimen collection should occur prior to Sulfasalazine administration due to the potential for falsely depressed results       ALT 07/16/2022 41  12 - 78 U/L Final    Specimen collection should occur prior to Sulfasalazine and/or Sulfapyridine administration due to the potential for falsely depressed results   Alkaline Phosphatase 07/16/2022 48  46 - 116 U/L Final    Total Protein 07/16/2022 6 8  6 4 - 8 2 g/dL Final    Albumin 07/16/2022 3 6  3 5 - 5 0 g/dL Final    Total Bilirubin 07/16/2022 0 43  0 20 - 1 00 mg/dL Final    Use of this assay is not recommended for patients undergoing treatment with eltrombopag due to the potential for falsely elevated results   eGFR 07/16/2022 55  ml/min/1 73sq m Final     We increased her Zoloft     Patient ID: Krish Rahman is a 75 y o  female      HPI ROS Appetite Changes and Sleep: normal appetite, normal energy level and recent weight gain(3lbs)    Review Of Systems:     Mood Anxiety and Depression   Behavior Normal    Thought Content Disturbing Thoughts, Feelings   General Normal    Personality Normal   Other Psych Symptoms Normal   Constitutional As Noted in HPI   ENT Normal   Cardiovascular Normal    Respiratory Normal    Gastrointestinal Normal   Genitourinary Normal    Musculoskeletal Arthralgias and Joint Stiffness   Integumentary Normal    Neurological Numbness and Tingling   Endocrine Normal    Other Symptoms Normal              Laboratory Results:  Reviewed see above      Substance Abuse History:  Social History     Substance and Sexual Activity   Drug Use No       Family Psychiatric History:   Family History   Problem Relation Age of Onset    Aneurysm Mother         Abd aorta aneurysm repair 2 dock limbs w/ visc extension pros    Hypertension Father     Subarachnoid hemorrhage Father     No Known Problems Daughter     No Known Problems Maternal Grandmother     No Known Problems Maternal Grandfather     No Known Problems Paternal Grandmother     No Known Problems Paternal Grandfather     No Known Problems Paternal Aunt        The following portions of the patient's history were reviewed and updated as appropriate: allergies, current medications, past family history, past medical history, past social history, past surgical history and problem list     Social History     Socioeconomic History    Marital status: /Civil Union     Spouse name: Not on file    Number of children: Not on file    Years of education: Not on file    Highest education level: Not on file   Occupational History    Not on file   Tobacco Use    Smoking status: Never Smoker    Smokeless tobacco: Never Used   Vaping Use    Vaping Use: Never used   Substance and Sexual Activity    Alcohol use: No    Drug use: No    Sexual activity: Yes     Partners: Male     Birth control/protection: Post-menopausal   Other Topics Concern    Not on file   Social History Narrative    Not on file     Social Determinants of Health     Financial Resource Strain: Not on file   Food Insecurity: Not on file   Transportation Needs: Not on file   Physical Activity: Not on file   Stress: Not on file   Social Connections: Not on file   Intimate Partner Violence: Not on file   Housing Stability: Not on file     Social History     Social History Narrative    Not on file       Objective:       Mental status:  Appearance calm and cooperative  and adequate hygiene and grooming   Mood anxious   Affect affect appropriate    Speech a normal rate   Thought Processes coherent/organized and normal thought processes   Hallucinations no hallucinations present    Thought Content no delusions   Abnormal Thoughts no suicidal thoughts  and no homicidal thoughts    Orientation  oriented to person and place and time   Remote Memory short term memory intact and long term memory intact   Attention Span concentration intact   Intellect Appears to be of Average Intelligence   Insight Insight intact   Judgement judgment was intact   Muscle Strength Muscle strength and tone were normal   Language no difficulty naming common objects, no difficulty repeating a phrase  and no difficulty writing a sentence    Fund of Knowledge displays adequate knowledge of current events, adequate fund of knowledge regarding past history and adequate fund of knowledge regarding vocabulary    Pain moderate to severe   Pain Scale 4       Assessment/Plan:       Diagnoses and all orders for this visit:    Anxiety  -     busPIRone (BUSPAR) 10 mg tablet; Take 1 tablet (10 mg total) by mouth 3 (three) times a day  -     LORazepam (ATIVAN) 0 5 mg tablet; Take 0 5 tablets (0 25 mg total) by mouth 3 (three) times a day for 15 days, THEN 0 5 tablets (0 25 mg total) 2 (two) times a day for 15 days  -     cloNIDine (CATAPRES) 0 1 mg tablet; Take 1 tablet (0 1 mg total) by mouth 3 (three) times a day as needed (anxiety/ withdrawal symptoms)    PTSD (post-traumatic stress disorder)            Treatment Recommendations- Risks Benefits      Immediate Medical/Psychiatric/Psychotherapy Treatments and Any Precautions:  Risks and benefits were discussed with patient and her  who attended the visit with her with her agreement  Will continue taper of Ativan slowly by decreasing 0 25 mg every 2 weeks to the next visit  Will keep Zoloft at 150 mg for few more weeks before increasing the dose to 200 mg  Will increase BuSpar does to make it 10 mg 3 times a day hypothyroid anxiety as it seems the predominant symptom  I also added clonidine to help with withdrawal symptoms of Ativan  Risks, Benefits And Possible Side Effects Of Medications:  Discussed side effects of SSRI, benzodiazepines and posterior hypotension of Catapres  Controlled Medication Discussion: Discussed with patient Black Box warning on concurrent use of benzodiazepines and opioid medications including sedation, respiratory depression, coma and death  Patient understands the risk of treatment with benzodiazepines in addition to opioids and wants to continue taking those medications  , Discussed with patient the risks of sedation, respiratory depression, impairment of ability to drive and potential for abuse and addiction related to treatment with benzodiazepine medications   The patient understands risk of treatment with benzodiazepine medications, agrees to not drive if feels impaired and agrees to take medications as prescribed  and The patient has been filling controlled prescriptions on time as prescribed to [facility]       Psychotherapy Provided: Individual psychotherapy provided       Goals discussed in session:  Discussed getting of benzodiazepine, controlling anxiety and depression improvement of PTSD and lifestyle modifications    Counseling provided: 30                     This note was not shared with the patient due to reasonable likelihood of causing patient harm

## 2022-09-14 NOTE — TELEPHONE ENCOUNTER
Pharmacy called and lm on [employer]  An interaction came up with the new Clonidine prescription  Patient is on Metoprolol and interaction states if one is discontinued there can be a potential for severe hypotension  Please call pharmacy and let know if still want them to fill Clonidine or not         [phone]

## 2022-10-03 DIAGNOSIS — F43.10 POSTTRAUMATIC STRESS DISORDER: ICD-10-CM

## 2022-10-03 RX ORDER — SERTRALINE HYDROCHLORIDE 100 MG/1
150 TABLET, FILM COATED ORAL DAILY
Qty: 30 TABLET | Refills: 3 | Status: SHIPPED | OUTPATIENT
Start: 2022-10-03 | End: 2022-10-19 | Stop reason: SDUPTHER

## 2022-10-07 DIAGNOSIS — F41.9 ANXIETY: ICD-10-CM

## 2022-10-07 RX ORDER — LORAZEPAM 0.5 MG/1
0.25 TABLET ORAL 2 TIMES DAILY PRN
Qty: 15 TABLET | Refills: 0 | Status: SHIPPED | OUTPATIENT
Start: 2022-10-07 | End: 2022-10-19 | Stop reason: ALTCHOICE

## 2022-10-19 ENCOUNTER — OFFICE VISIT (OUTPATIENT)
Dept: PSYCHIATRY | Facility: CLINIC | Age: 67
End: 2022-10-19
Payer: MEDICARE

## 2022-10-19 DIAGNOSIS — F33.1 MDD (MAJOR DEPRESSIVE DISORDER), RECURRENT EPISODE, MODERATE (HCC): Primary | ICD-10-CM

## 2022-10-19 DIAGNOSIS — F43.10 POSTTRAUMATIC STRESS DISORDER: ICD-10-CM

## 2022-10-19 PROCEDURE — 99214 OFFICE O/P EST MOD 30 MIN: CPT | Performed by: STUDENT IN AN ORGANIZED HEALTH CARE EDUCATION/TRAINING PROGRAM

## 2022-10-19 RX ORDER — BUSPIRONE HYDROCHLORIDE 15 MG/1
15 TABLET ORAL 3 TIMES DAILY
Qty: 90 TABLET | Refills: 1 | Status: SHIPPED | OUTPATIENT
Start: 2022-10-19 | End: 2022-11-18

## 2022-10-19 RX ORDER — SERTRALINE HYDROCHLORIDE 100 MG/1
200 TABLET, FILM COATED ORAL DAILY
Qty: 30 TABLET | Refills: 1 | Status: SHIPPED | OUTPATIENT
Start: 2022-10-19 | End: 2022-11-18

## 2022-10-19 NOTE — PSYCH
MEDICATION MANAGEMENT NOTE        [facility]      Name and Date of Birth:  Lakshmi Gould 75 y o  1955 MRN: [id number]    Date of Visit: October 19, 2022    Reason for Visit:   Chief Complaint   Patient presents with   • Follow-up       SUBJECTIVE:    Johny Grove is seen today for a follow up for Major Depressive Disorder and anxiety  She continues to do fairly well since the last visit  She reports doing very well, reports that anxiety symptoms and depressive symptoms has been fairly stable  She reports experience on and off anxiety symptoms  Patient is currently on Zoloft 150 mg and BuSpar 10 mg t i d  for anxiety and depression  She stopped taking Ativan after doing gradual taper over the last 4-6 weeks  She denies any withdrawal symptoms from Ativan and she reports good tolerance to the current medications without side effects   was present during the visit today  He reported that his wife is doing better also with increasing the lights during the day and early afternoon as she seems getting more depressed by the nighttime and by darkness  She is able to take care of herself and do her basic needs  Her memory and cognitive functions are good  She denies any suicidal ideation, intent or plan at present; denies any homicidal ideation, intent or plan at present  She denies any auditory hallucinations, denies any visual hallucinations, denies any overt delusions  She denies any side effects from psychiatric medications      HPI ROS Appetite Changes and Sleep:     She reports normal sleep, normal appetite, normal energy level      Review Of Systems:      Constitutional negative   ENT negative   Cardiovascular negative   Respiratory negative   Gastrointestinal negative   Genitourinary negative   Musculoskeletal back pain   Integumentary negative   Neurological negative   Endocrine negative   Other Symptoms none, all other systems are negative Past Medical History:    Past Medical History:   Diagnosis Date   • Arthritis    • Back pain    • Bipolar disorder ([facility] [address] )    • Depression    • GERD (gastroesophageal reflux disease)    • Hypertension     last assessed: 06/29/12   • Sarcoidosis    • 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        Past Surgical History:   Procedure Laterality Date   • BILATERAL OOPHORECTOMY     • BLADDER SURGERY     • CATARACT EXTRACTION, BILATERAL     • COLPORRHAPHY      Anterior, repair of cystocele   • HYSTERECTOMY      at age 43   • OOPHORECTOMY Bilateral     at age 43   • RETINAL DETACHMENT SURGERY Left      Allergies   Allergen Reactions   • Tramadol Itching   • Carvedilol Fever   • Dust Mite Extract    • Influenza Virus Vaccine Visual Disturbance     Reaction Date: 28Feb2012;    • Molds & Smuts    • Juan Grass Pollen Allergen    • Tree Extract        Substance Abuse History:    Social History     Substance and Sexual Activity   Alcohol Use No     Social History     Substance and Sexual Activity   Drug Use No       Social History:    Social History     Socioeconomic History   • Marital status: /Civil Union     Spouse name: Not on file   • Number of children: Not on file   • Years of education: Not on file   • Highest education level: Not on file   Occupational History   • Not on file   Tobacco Use   • Smoking status: Never Smoker   • Smokeless tobacco: Never Used   Vaping Use   • Vaping Use: Never used   Substance and Sexual Activity   • Alcohol use: No   • Drug use: No   • Sexual activity: Yes     Partners: Male     Birth control/protection: Post-menopausal   Other Topics Concern   • Not on file   Social History Narrative   • Not on file     Social Determinants of Health     Financial Resource Strain: Not on file   Food Insecurity: Not on file   Transportation Needs: Not on file   Physical Activity: Not on file   Stress: Not on file   Social Connections: Not on file   Intimate Partner Violence: Not on file   Housing Stability: Not on file       Family Psychiatric History:     Family History   Problem Relation Age of Onset   • Aneurysm Mother         Abd aorta aneurysm repair 2 dock limbs w/ visc extension pros   • Hypertension Father    • Subarachnoid hemorrhage Father    • No Known Problems Daughter    • No Known Problems Maternal Grandmother    • No Known Problems Maternal Grandfather    • No Known Problems Paternal Grandmother    • No Known Problems Paternal Grandfather    • No Known Problems Paternal Aunt        History Review: The following portions of the patient's history were reviewed and updated as appropriate: allergies, current medications, past family history, past medical history, past social history, past surgical history and problem list          OBJECTIVE:     Vital signs in last 24 hours: There were no vitals filed for this visit      Mental Status Evaluation:    Appearance age appropriate, casually dressed   Behavior cooperative, calm   Speech normal rate, normal volume, normal pitch   Mood improved   Affect normal range and intensity, appropriate   Thought Processes organized, goal directed   Associations intact associations   Thought Content no overt delusions   Perceptual Disturbances: no auditory hallucinations, no visual hallucinations   Abnormal Thoughts  Risk Potential Suicidal ideation - None  Homicidal ideation - None  Potential for aggression - No   Orientation oriented to person, place, time/date and situation   Memory recent and remote memory grossly intact   Consciousness alert and awake   Attention Span Concentration Span attention span and concentration are age appropriate   Intellect appears to be of average intelligence   Insight intact   Judgement intact   Muscle Strength and  Gait normal muscle strength and normal muscle tone   Motor activity no abnormal movements   Language no difficulty naming common objects, no difficulty repeating a phrase, no difficulty writing a sentence   Fund of Knowledge adequate knowledge of current events  adequate fund of knowledge regarding past history  adequate fund of knowledge regarding vocabulary    Pain moderate   Pain Scale 5       Laboratory Results: I have personally reviewed all pertinent laboratory/tests results    Most Recent Labs:   Lab Results   Component Value Date    WBC 6 17 06/30/2014    RBC 4 27 06/30/2014    HGB 13 0 06/30/2014    HCT 39 5 06/30/2014     06/30/2014    RDW 13 0 06/30/2014    NEUTROABS 4 69 06/30/2014     12/15/2015    K 4 5 07/16/2022     (H) 07/16/2022    CO2 24 07/16/2022    BUN 23 07/16/2022    CREATININE 1 04 07/16/2022    GLUCOSE 96 12/15/2015    CALCIUM 9 0 07/16/2022    AST 27 07/16/2022    ALT 41 07/16/2022    ALKPHOS 48 07/16/2022    PROT 6 5 12/15/2015    BILITOT 0 30 12/15/2015    CHOLESTEROL 191 07/16/2022    TRIG 112 07/16/2022    HDL 90 07/16/2022    LDLCALC 79 07/16/2022    [place] 101 07/16/2022       Suicide/Homicide Risk Assessment:    Risk of Harm to Self:  The following ratings are based on assessment at the time of the interview and review of records  Based on today's assessment, Judit Freitas presents the following risk of harm to self: none    Risk of Harm to Others: The following ratings are based on assessment at the time of the interview  Based on today's assessment, Judit Freitas presents the following risk of harm to others: minimal    The following interventions are recommended: no intervention changes needed, not applicable    Assessment/Plan:  Judit Freitas is a 75years old adult patient was seen today for follow-up her anxiety and depressive symptoms  With successfully tapered off her lorazepam because she was getting dependent on it and not recommended for her age  We were able to increase Zoloft and BuSpar to help with her anxiety  She is responding well to the medication changes and has residual anxiety and depression that are very mild in severity    Discussed with patient and her  importance of light therapy during winter time  Also advised him to have some time during winter in a warmer environment to help her coping with winter  Today we will increase Zoloft to a maximum dose to 200 mg and will increase BuSpar to 15 mg t i d  for residual symptoms and to maximize the benefit from medications      Diagnoses and all orders for this visit:    MDD (major depressive disorder), recurrent episode, moderate (HCC)  -     busPIRone (BUSPAR) 15 mg tablet; Take 1 tablet (15 mg total) by mouth 3 (three) times a day    Posttraumatic stress disorder  -     busPIRone (BUSPAR) 15 mg tablet; Take 1 tablet (15 mg total) by mouth 3 (three) times a day  -     sertraline (ZOLOFT) 100 mg tablet; Take 2 tablets (200 mg total) by mouth daily          Treatment Recommendations/Precautions:    Increase Zoloft 200 mg daily to improve depressive symptoms  Increase Buspar 15 mg three times a day to improve anxiety symptoms  Discontinue Ativan 0 5 mg twice a day to improve anxiety symptoms  Medication changes: I have discontinued Heron Stanton's busPIRone, cloNIDine, and LORazepam  I have also changed her sertraline  Additionally, I am having her start on busPIRone  Lastly, I am having her maintain her calcium citrate-vitamin D, Multiple Vitamin (DAILY VALUE MULTIVITAMIN PO), Mavik, metoprolol succinate, Vitamin D3, carboxymethylcellulose, ondansetron, Omega-3 Fish Oil, hyoscyamine, loperamide, mometasone, b complex vitamins, GenTeal, and Carboxymethylcellulose Sodium      Current Outpatient Medications:   •  b complex vitamins capsule, Take 2 capsules by mouth daily, Disp: , Rfl:   •  busPIRone (BUSPAR) 15 mg tablet, Take 1 tablet (15 mg total) by mouth 3 (three) times a day, Disp: 90 tablet, Rfl: 1  •  calcium citrate-vitamin D (CITRACAL+D) 315-200 MG-UNIT per tablet, Take 2 tablets by mouth Twice daily OsteoMatrix, Disp: , Rfl:   •  Carboxymethylcell-Hypromellose (GenTeal) 0 25-0 3 % GEL, Apply to eye daily at bedtime, Disp: , Rfl:   •  carboxymethylcellulose (REFRESH PLUS) 0 5 % SOLN, Apply to eye, Disp: , Rfl:   •  Carboxymethylcellulose Sodium 1 % GEL, Apply to eye, Disp: , Rfl:   •  Cholecalciferol (VITAMIN D3) 1000 units CAPS, Take by mouth, Disp: , Rfl:   •  hyoscyamine (LEVBID) 0 375 mg 12 hr tablet, Take 1 tablet (0 375 mg total) by mouth 2 (two) times a day, Disp: 180 tablet, Rfl: 3  •  loperamide (Anti-Diarrheal) 2 MG tablet, TAKE ONE TABLET BY MOUTH IN THE MORNING AND ONE-HALF TABLET IN THE EVENING, Disp: 144 tablet, Rfl: 3  •  metoprolol succinate (TOPROL-XL) 50 mg 24 hr tablet, Take 0 5 tablets by mouth 2 (two) times a day, Disp: , Rfl:   •  mometasone (NASONEX) 50 mcg/act nasal spray, , Disp: , Rfl:   •  Multiple Vitamin (DAILY VALUE MULTIVITAMIN PO), Take 1 tablet by mouth in the morning, Disp: , Rfl:   •  Omega-3 Fatty Acids (OMEGA-3 FISH OIL) 1200 MG CAPS, Shaklee OmegaGuard fish oil, Disp: , Rfl:   •  ondansetron (ZOFRAN) 4 mg tablet, Take 1 tablet (4 mg total) by mouth every 8 (eight) hours as needed for nausea or vomiting, Disp: 30 tablet, Rfl: 3  •  sertraline (ZOLOFT) 100 mg tablet, Take 2 tablets (200 mg total) by mouth daily, Disp: 30 tablet, Rfl: 1  •  trandolapril (Mavik) 2 MG tablet, Take 1 tablet by mouth daily  , Disp: , Rfl:   Daisha Scott has a current medication list which includes the following prescription(s): b complex vitamins, buspirone, calcium citrate-vitamin d, genteal, carboxymethylcellulose, carboxymethylcellulose sodium, vitamin d3, hyoscyamine, loperamide, metoprolol succinate, mometasone, multiple vitamin, omega-3 fish oil, ondansetron, sertraline, and mavik    Medication management with psychotherapy every 2 months  Aware of 24 hour and weekend coverage for urgent situations accessed by calling [facility] main practice number    Medications Risks/Benefits      Risks, Benefits And Possible Side Effects Of Medications:    Risks, benefits, and possible side effects of medications explained to Sunitha De Leon and she verbalizes understanding and agreement for treatment  Controlled Medication Discussion:     Sunitha De Leon has been filling controlled prescriptions on time as prescribed according to [address] [facility] Monitoring Program    Psychotherapy Provided:     Individual psychotherapy provided: Yes  Counseling was provided during the session today for 16 minutes  Medications, treatment progress and treatment plan reviewed with Sunitha De Leon  Medication changes discussed with Sunitha De Leon  Medication education provided to Sunitha De Leon  Importance of medication and treatment compliance reviewed with Sunitha Casanovar  Educated on importance of medication and treatment compliance  Supportive therapy provided  Cognitive therapy was utilized during the session  Reassurance and supportive therapy provided  Reoriented to reality and reassured  Treatment Plan:    Completed and signed during the session: Not applicable - Treatment Plan not due at this session    Note Share:     This note was not shared with the patient due to reasonable likelihood of causing patient harm    Visit start and stop times:    Start Time: 10:30 AM  Stop Time: 11:00 AM    I spent 30 minutes directly with the patient during this visit    Tom Dee MD 10/19/22

## 2022-11-28 DIAGNOSIS — F43.10 POSTTRAUMATIC STRESS DISORDER: ICD-10-CM

## 2022-11-28 RX ORDER — SERTRALINE HYDROCHLORIDE 100 MG/1
200 TABLET, FILM COATED ORAL DAILY
Qty: 30 TABLET | Refills: 1 | Status: SHIPPED | OUTPATIENT
Start: 2022-11-28 | End: 2022-12-07 | Stop reason: SDUPTHER

## 2022-12-05 ENCOUNTER — OFFICE VISIT (OUTPATIENT)
Dept: PAIN MEDICINE | Facility: MEDICAL CENTER | Age: 67
End: 2022-12-05

## 2022-12-05 VITALS — HEIGHT: 68 IN | BODY MASS INDEX: 18.79 KG/M2 | WEIGHT: 124 LBS

## 2022-12-05 DIAGNOSIS — M48.061 SPINAL STENOSIS OF LUMBAR REGION WITHOUT NEUROGENIC CLAUDICATION: ICD-10-CM

## 2022-12-05 DIAGNOSIS — M46.1 SACROILIITIS (HCC): Primary | ICD-10-CM

## 2022-12-05 DIAGNOSIS — M47.816 LUMBAR SPONDYLOSIS: ICD-10-CM

## 2022-12-05 NOTE — PATIENT INSTRUCTIONS
Sacroiliac Joint Injection   WHAT YOU NEED TO KNOW:   What do I need to know about a sacroiliac joint injection? A sacroiliac (SI) joint injection is done to diagnose or treat pain from sacroiliac joint syndrome  The pain caused by this syndrome may be felt in your lower back, buttocks, groin, and your thigh  How do I prepare for an SI injection? Your healthcare provider will ask you to not take any pain medicine the day of the injection  Ask him if there are any other medicines you should not take  You will need to find someone to drive you home after your procedure  What will happen during the SI injection? You will be awake for your injection  You may be given calming medicine if you are anxious  You will lie on your stomach with a pillow under your abdomen  Your healthcare provider will give you an injection of medicine to numb the area  He may use an x-ray, ultrasound, or CT scan to find the area to inject  You may also be given an injection of contrast material to help your SI joint show up better  Then, your healthcare provider will inject local anesthesia, antiinflammatory medicine, or both into your SI joint  Healthcare providers will watch you closely for any problems for up to 30 minutes after your injection  Your healthcare provider will check to see if your pain decreases after the injection  What are the risks of an SI injection? You may have some weakness for a short time after your injection  The SI injection can cause bleeding, infection, and pain  It can also cause temporary weakness in your leg and problems urinating  You may have an allergic reaction to the medicine that is injected into your SI joint  Your pain may return and you may need more treatment  CARE AGREEMENT:   You have the right to help plan your care  Learn about your health condition and how it may be treated  Discuss treatment options with your healthcare providers to decide what care you want to receive   You always have the right to refuse treatment  The above information is an  only  It is not intended as medical advice for individual conditions or treatments  Talk to your doctor, nurse or pharmacist before following any medical regimen to see if it is safe and effective for you  © Copyright [employer] 2022 Information is for End User's use only and may not be sold, redistributed or otherwise used for commercial purposes   All illustrations and images included in CareNotes® are the copyrighted property of A D A M , Inc  or [address] [place] [address]

## 2022-12-05 NOTE — PROGRESS NOTES
Assessment:  1  Sacroiliitis ([facility] [address] )    2  Spinal stenosis of lumbar region without neurogenic claudication    3  Lumbar spondylosis        Plan:  While the patient was in the office today, I did have a thorough conversation regarding their chronic pain syndrome and treatment plan options  Based on patient report and physical exam, the patient's symptomatology does seem to be consistent with sacroiliac mediated pain from sacroiliitis  We will schedule the patient for a right SIJ injection to decrease any inflammatory component of the patient's pain symptoms  Consider epidural steroid injections in the future if the radicular component of her pain pattern increases  Follow-up after the procedure  Complete risks and benefits including bleeding, infection, tissue reaction, nerve injury and allergic reaction were discussed  The approach was demonstrated using models and literature was provided  Verbal and written consent was obtained  My impressions and treatment recommendations were discussed in detail with the patient who verbalized understanding and had no further questions  Discharge instructions were provided  I personally saw and examined the patient and I agree with the above discussed plan of care  Orders Placed This Encounter   Procedures   • FL spine and pain procedure     Standing Status:   Future     Standing Expiration Date:   12/5/2026     Order Specific Question:   Reason for Exam:     Answer:   RT SIJ     Order Specific Question:   Anticoagulant hold needed? Answer:   no     No orders of the defined types were placed in this encounter  History of Present Illness:  Jersey Patel is a 75 y o  female who presents for a follow up office visit in regards to back pain  The patient’s current symptoms include chronic low back pain that she describes as an intermittent sharp and shooting pain primarily on the right side    She does have intermittent pain that radiates into the right thigh   In April she underwent a right-sided sacroiliac joint injection and reported approximately 70% reduction of pain that lasted up until recently  I have personally reviewed and/or updated the patient's past medical history, past surgical history, family history, social history, current medications, allergies, and vital signs today  Review of Systems   Musculoskeletal: Positive for back pain  All other systems reviewed and are negative        Patient Active Problem List   Diagnosis   • Sarcoidosis   • Sacroiliitis ([facility] )   • Posttraumatic stress disorder   • Protein calorie malnutrition ([facility] )   • Primary dilated cardiomyopathy ([facility] )       Past Medical History:   Diagnosis Date   • Arthritis    • Back pain    • Bipolar disorder ([facility] )    • Depression    • GERD (gastroesophageal reflux disease)    • Hypertension     last assessed: 06/29/12   • Sarcoidosis    • 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       Past Surgical History:   Procedure Laterality Date   • BILATERAL OOPHORECTOMY     • BLADDER SURGERY     • CATARACT EXTRACTION, BILATERAL     • COLPORRHAPHY      Anterior, repair of cystocele   • HYSTERECTOMY      at age 43   • OOPHORECTOMY Bilateral     at age 43   • RETINAL DETACHMENT SURGERY Left        Family History   Problem Relation Age of Onset   • Aneurysm Mother         Abd aorta aneurysm repair 2 dock limbs w/ visc extension pros   • Hypertension Father    • Subarachnoid hemorrhage Father    • No Known Problems Daughter    • No Known Problems Maternal Grandmother    • No Known Problems Maternal Grandfather    • No Known Problems Paternal Grandmother    • No Known Problems Paternal Grandfather    • No Known Problems Paternal Aunt        Social History     Occupational History   • Not on file   Tobacco Use   • Smoking status: Never   • Smokeless tobacco: Never   Vaping Use   • Vaping Use: Never used   Substance and Sexual Activity   • Alcohol use: No   • Drug use: No   • Sexual activity: Yes     Partners: Male     Birth control/protection: Post-menopausal       Current Outpatient Medications on File Prior to Visit   Medication Sig   • b complex vitamins capsule Take 2 capsules by mouth daily   • busPIRone (BUSPAR) 15 mg tablet Take 1 tablet (15 mg total) by mouth 3 (three) times a day   • calcium citrate-vitamin D (CITRACAL+D) 315-200 MG-UNIT per tablet Take 2 tablets by mouth Twice daily OsteoMatrix   • Carboxymethylcell-Hypromellose (GenTeal) 0 25-0 3 % GEL Apply to eye daily at bedtime   • carboxymethylcellulose (REFRESH PLUS) 0 5 % SOLN Apply to eye   • Carboxymethylcellulose Sodium 1 % GEL Apply to eye   • Cholecalciferol (VITAMIN D3) 1000 units CAPS Take by mouth   • hyoscyamine (LEVBID) 0 375 mg 12 hr tablet Take 1 tablet (0 375 mg total) by mouth 2 (two) times a day   • loperamide (Anti-Diarrheal) 2 MG tablet TAKE ONE TABLET BY MOUTH IN THE MORNING AND ONE-HALF TABLET IN THE EVENING   • metoprolol succinate (TOPROL-XL) 50 mg 24 hr tablet Take 0 5 tablets by mouth 2 (two) times a day   • mometasone (NASONEX) 50 mcg/act nasal spray    • Multiple Vitamin (DAILY VALUE MULTIVITAMIN PO) Take 1 tablet by mouth in the morning   • Omega-3 Fatty Acids (OMEGA-3 FISH OIL) 1200 MG CAPS [employer]ak[employer] OmegaGuard fish oil   • ondansetron (ZOFRAN) 4 mg tablet Take 1 tablet (4 mg total) by mouth every 8 (eight) hours as needed for nausea or vomiting   • sertraline (ZOLOFT) 100 mg tablet Take 2 tablets (200 mg total) by mouth daily   • trandolapril (Mavik) 2 MG tablet Take 1 tablet by mouth daily       No current facility-administered medications on file prior to visit         Allergies   Allergen Reactions   • Tramadol Itching   • Carvedilol Fever   • Dust Mite Extract    • Influenza Virus Vaccine Visual Disturbance     Reaction Date: 28Feb2012;    • Molds & Smuts    • Juan Grass Pollen Allergen    • Tree Extract        Physical Exam:    Ht 5' 8" (1 727 m) Comment: verbal  Wt 56 2 kg (124 lb) BMI 18 85 kg/m²     Constitutional:normal, well developed, well nourished, alert, in no distress and non-toxic and no overt pain behavior  Eyes:anicteric  HEENT:grossly intact  Neck:supple, symmetric, trachea midline and no masses   Pulmonary:even and unlabored  Cardiovascular:No edema or pitting edema present  Skin:Normal without rashes or lesions and well hydrated  Psychiatric:Mood and affect appropriate  Neurologic:Cranial Nerves II-XII grossly intact  Musculoskeletal:  Tender right sacroiliac joint and right lumbar facet joints, gait is slow but stable      Imaging

## 2022-12-07 ENCOUNTER — OFFICE VISIT (OUTPATIENT)
Dept: PSYCHIATRY | Facility: CLINIC | Age: 67
End: 2022-12-07

## 2022-12-07 DIAGNOSIS — F33.1 MDD (MAJOR DEPRESSIVE DISORDER), RECURRENT EPISODE, MODERATE (HCC): ICD-10-CM

## 2022-12-07 DIAGNOSIS — F43.10 POSTTRAUMATIC STRESS DISORDER: Primary | ICD-10-CM

## 2022-12-07 RX ORDER — BUSPIRONE HYDROCHLORIDE 15 MG/1
15 TABLET ORAL 3 TIMES DAILY
Qty: 270 TABLET | Refills: 0 | Status: SHIPPED | OUTPATIENT
Start: 2022-12-07 | End: 2023-03-07

## 2022-12-07 RX ORDER — SERTRALINE HYDROCHLORIDE 100 MG/1
200 TABLET, FILM COATED ORAL DAILY
Qty: 180 TABLET | Refills: 0 | Status: SHIPPED | OUTPATIENT
Start: 2022-12-07 | End: 2023-03-07

## 2022-12-07 NOTE — PSYCH
MEDICATION MANAGEMENT NOTE        [facility]      Name and Date of Birth:  Jamila Meyer 75 y o  1955 MRN: [id number]    Date of Visit: December 7, 2022    Reason for Visit:   Chief Complaint   Patient presents with   • Follow-up       SUBJECTIVE:    Jonny Lopez is seen today for a follow up for Major Depressive Disorder and anxiety  She continues to do fairly well since the last visit  She reports doing very well, reports that anxiety symptoms and depressive symptoms has been fairly stable  She reports experience on and off anxiety symptoms  Patient is currently on Zoloft 200 mg and BuSpar 15 mg t i d  for anxiety and depression  Her depression and anxiety improved significantly since maximizing Zoloft and Buspar doses after last visit  She feels the best version of herself and her  feels the same   was present during the visit today  He reported that his wife is doing better also with increasing the lights during the day and early afternoon as she seems getting more depressed by the nighttime and by darkness  She is able to take care of herself and do her basic needs  Her memory and cognitive functions are good  She denies any suicidal ideation, intent or plan at present; denies any homicidal ideation, intent or plan at present  She denies any auditory hallucinations, denies any visual hallucinations, denies any overt delusions  She denies any side effects from psychiatric medications      HPI ROS Appetite Changes and Sleep:     She reports normal sleep, normal appetite, normal energy level      Review Of Systems:      Constitutional negative   ENT negative   Cardiovascular negative   Respiratory negative   Gastrointestinal negative   Genitourinary negative   Musculoskeletal back pain   Integumentary negative   Neurological negative   Endocrine negative   Other Symptoms none, all other systems are negative         Past Medical History:    Past Medical History:   Diagnosis Date   • Arthritis    • Back pain    • Bipolar disorder ([facility] [address] )    • Depression    • GERD (gastroesophageal reflux disease)    • Hypertension     last assessed: 06/29/12   • Sarcoidosis    • 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        Past Surgical History:   Procedure Laterality Date   • BILATERAL OOPHORECTOMY     • BLADDER SURGERY     • CATARACT EXTRACTION, BILATERAL     • COLPORRHAPHY      Anterior, repair of cystocele   • HYSTERECTOMY      at age 43   • OOPHORECTOMY Bilateral     at age 43   • RETINAL DETACHMENT SURGERY Left      Allergies   Allergen Reactions   • Tramadol Itching   • Carvedilol Fever   • Dust Mite Extract    • Influenza Virus Vaccine Visual Disturbance     Reaction Date: 28Feb2012;    • Molds & Smuts    • Juan Grass Pollen Allergen    • Tree Extract        Substance Abuse History:    Social History     Substance and Sexual Activity   Alcohol Use No     Social History     Substance and Sexual Activity   Drug Use No       Social History:    Social History     Socioeconomic History   • Marital status: /Civil Union     Spouse name: Not on file   • Number of children: Not on file   • Years of education: Not on file   • Highest education level: Not on file   Occupational History   • Not on file   Tobacco Use   • Smoking status: Never   • Smokeless tobacco: Never   Vaping Use   • Vaping Use: Never used   Substance and Sexual Activity   • Alcohol use: No   • Drug use: No   • Sexual activity: Yes     Partners: Male     Birth control/protection: Post-menopausal   Other Topics Concern   • Not on file   Social History Narrative   • Not on file     Social Determinants of Health     Financial Resource Strain: Not on file   Food Insecurity: Not on file   Transportation Needs: Not on file   Physical Activity: Not on file   Stress: Not on file   Social Connections: Not on file   Intimate Partner Violence: Not on file   Housing Stability: Not on file       Family Psychiatric History:     Family History   Problem Relation Age of Onset   • Aneurysm Mother         Abd aorta aneurysm repair 2 dock limbs w/ visc extension pros   • Hypertension Father    • Subarachnoid hemorrhage Father    • No Known Problems Daughter    • No Known Problems Maternal Grandmother    • No Known Problems Maternal Grandfather    • No Known Problems Paternal Grandmother    • No Known Problems Paternal Grandfather    • No Known Problems Paternal Aunt        History Review: The following portions of the patient's history were reviewed and updated as appropriate: allergies, current medications, past family history, past medical history, past social history, past surgical history and problem list          OBJECTIVE:     Vital signs in last 24 hours: There were no vitals filed for this visit      Mental Status Evaluation:    Appearance age appropriate, casually dressed   Behavior cooperative, calm   Speech normal rate, normal volume, normal pitch   Mood improved   Affect normal range and intensity, appropriate   Thought Processes organized, goal directed   Associations intact associations   Thought Content no overt delusions   Perceptual Disturbances: no auditory hallucinations, no visual hallucinations   Abnormal Thoughts  Risk Potential Suicidal ideation - None  Homicidal ideation - None  Potential for aggression - No   Orientation oriented to person, place, time/date and situation   Memory recent and remote memory grossly intact   Consciousness alert and awake   Attention Span Concentration Span attention span and concentration are age appropriate   Intellect appears to be of average intelligence   Insight intact   Judgement intact   Muscle Strength and  Gait normal muscle strength and normal muscle tone   Motor activity no abnormal movements   Language no difficulty naming common objects, no difficulty repeating a phrase, no difficulty writing a sentence   [facility] of Knowledge adequate knowledge of current events  adequate fund of knowledge regarding past history  adequate fund of knowledge regarding vocabulary    Pain moderate   Pain Scale 5       Laboratory Results: I have personally reviewed all pertinent laboratory/tests results    Most Recent Labs:   Lab Results   Component Value Date    WBC 6 17 06/30/2014    RBC 4 27 06/30/2014    HGB 13 0 06/30/2014    HCT 39 5 06/30/2014     06/30/2014    RDW 13 0 06/30/2014    NEUTROABS 4 69 06/30/2014     12/15/2015    K 4 5 07/16/2022     (H) 07/16/2022    CO2 24 07/16/2022    BUN 23 07/16/2022    CREATININE 1 04 07/16/2022    GLUCOSE 96 12/15/2015    CALCIUM 9 0 07/16/2022    AST 27 07/16/2022    ALT 41 07/16/2022    ALKPHOS 48 07/16/2022    PROT 6 5 12/15/2015    BILITOT 0 30 12/15/2015    CHOLESTEROL 191 07/16/2022    TRIG 112 07/16/2022    HDL 90 07/16/2022    LDLCALC 79 07/16/2022    [place] 101 07/16/2022       Suicide/Homicide Risk Assessment:    Risk of Harm to Self:  The following ratings are based on assessment at the time of the interview and review of records  Based on today's assessment, Jose Juan Gibbs presents the following risk of harm to self: none    Risk of Harm to Others: The following ratings are based on assessment at the time of the interview  Based on today's assessment, Jose Juan Gibbs presents the following risk of harm to others: minimal    The following interventions are recommended: no intervention changes needed, not applicable    Assessment/Plan:  Jose Juan Gibbs is a 75years old adult patient was seen today for follow-up her anxiety and depressive symptoms  With successfully tapered off her lorazepam because she was getting dependent on it and not recommended for her age  We were able to increase Zoloft and BuSpar to help with her anxiety  She is responding well to the medication changes and has residual anxiety and depression that are very mild in severity    Discussed with patient and her  importance of light therapy during winter time  Her symptoms are considered now in remission and will see her less frequently       Diagnoses and all orders for this visit:    Posttraumatic stress disorder  -     busPIRone (BUSPAR) 15 mg tablet; Take 1 tablet (15 mg total) by mouth 3 (three) times a day  -     sertraline (ZOLOFT) 100 mg tablet; Take 2 tablets (200 mg total) by mouth daily    MDD (major depressive disorder), recurrent episode, moderate (HCC)  -     busPIRone (BUSPAR) 15 mg tablet; Take 1 tablet (15 mg total) by mouth 3 (three) times a day          Treatment Recommendations/Precautions:    Continue Zoloft 200 mg daily to improve depressive symptoms  Continue Buspar 15 mg three times a day to improve anxiety symptoms  Medication changes: I am having Zulema Stanton maintain her calcium citrate-vitamin D, Multiple Vitamin (DAILY VALUE MULTIVITAMIN PO), Mavik, metoprolol succinate, Vitamin D3, carboxymethylcellulose, ondansetron, Omega-3 Fish Oil, hyoscyamine, loperamide, mometasone, b complex vitamins, GenTeal, Carboxymethylcellulose Sodium, busPIRone, and sertraline      Current Outpatient Medications:   •  b complex vitamins capsule, Take 2 capsules by mouth daily, Disp: , Rfl:   •  busPIRone (BUSPAR) 15 mg tablet, Take 1 tablet (15 mg total) by mouth 3 (three) times a day, Disp: 270 tablet, Rfl: 0  •  calcium citrate-vitamin D (CITRACAL+D) 315-200 MG-UNIT per tablet, Take 2 tablets by mouth Twice daily OsteoMatrix, Disp: , Rfl:   •  Carboxymethylcell-Hypromellose (GenTeal) 0 25-0 3 % GEL, Apply to eye daily at bedtime, Disp: , Rfl:   •  carboxymethylcellulose (REFRESH PLUS) 0 5 % SOLN, Apply to eye, Disp: , Rfl:   •  Carboxymethylcellulose Sodium 1 % GEL, Apply to eye, Disp: , Rfl:   •  Cholecalciferol (VITAMIN D3) 1000 units CAPS, Take by mouth, Disp: , Rfl:   •  hyoscyamine (LEVBID) 0 375 mg 12 hr tablet, Take 1 tablet (0 375 mg total) by mouth 2 (two) times a day, Disp: 180 tablet, Rfl: 3  • loperamide (Anti-Diarrheal) 2 MG tablet, TAKE ONE TABLET BY MOUTH IN THE MORNING AND ONE-HALF TABLET IN THE EVENING, Disp: 144 tablet, Rfl: 3  •  metoprolol succinate (TOPROL-XL) 50 mg 24 hr tablet, Take 0 5 tablets by mouth 2 (two) times a day, Disp: , Rfl:   •  mometasone (NASONEX) 50 mcg/act nasal spray, , Disp: , Rfl:   •  Multiple Vitamin (DAILY VALUE MULTIVITAMIN PO), Take 1 tablet by mouth in the morning, Disp: , Rfl:   •  Omega-3 Fatty Acids (OMEGA-3 FISH OIL) 1200 MG CAPS, [place] OmegaGuard fish oil, Disp: , Rfl:   •  ondansetron (ZOFRAN) 4 mg tablet, Take 1 tablet (4 mg total) by mouth every 8 (eight) hours as needed for nausea or vomiting, Disp: 30 tablet, Rfl: 3  •  sertraline (ZOLOFT) 100 mg tablet, Take 2 tablets (200 mg total) by mouth daily, Disp: 180 tablet, Rfl: 0  •  trandolapril (Mavik) 2 MG tablet, Take 1 tablet by mouth daily  , Disp: , Rfl:   Flaquito Mcguire has a current medication list which includes the following prescription(s): b complex vitamins, calcium citrate-vitamin d, genteal, carboxymethylcellulose, carboxymethylcellulose sodium, vitamin d3, hyoscyamine, loperamide, metoprolol succinate, mometasone, multiple vitamin, omega-3 fish oil, ondansetron, sertraline, mavik, and buspirone  Medication management with psychotherapy every 2 months  Aware of 24 hour and weekend coverage for urgent situations accessed by calling [facility] main practice number    Medications Risks/Benefits      Risks, Benefits And Possible Side Effects Of Medications:    Risks, benefits, and possible side effects of medications explained to Flaquito Mcguire and she verbalizes understanding and agreement for treatment      Controlled Medication Discussion:     Flaquito Mcguire has been filling controlled prescriptions on time as prescribed according to [facility] Monitoring Program    Psychotherapy Provided:     Individual psychotherapy provided: Yes  Counseling was provided during the session today for 16 minutes  Medications, treatment progress and treatment plan reviewed with Nuzhat Sharp  Medication changes discussed with Nuzhat Sharp  Medication education provided to Nuzhat Sharp  Importance of medication and treatment compliance reviewed with Nuzhat Sharp  Educated on importance of medication and treatment compliance  Supportive therapy provided  Cognitive therapy was utilized during the session  Reassurance and supportive therapy provided  Reoriented to reality and reassured  Treatment Plan:    Completed and signed during the session: Not applicable - Treatment Plan not due at this session    Note Share:     This note was not shared with the patient due to reasonable likelihood of causing patient harm    Visit start and stop times:    Start Time: 10:30 AM  Stop Time: 11:00 AM    I spent 30 minutes directly with the patient during this visit    Marian Hester MD 12/07/22

## 2022-12-09 ENCOUNTER — HOSPITAL ENCOUNTER (OUTPATIENT)
Dept: MAMMOGRAPHY | Facility: HOSPITAL | Age: 67
Discharge: HOME | End: 2022-12-09
Attending: FAMILY MEDICINE

## 2022-12-09 VITALS — HEIGHT: 68 IN | WEIGHT: 124 LBS | BODY MASS INDEX: 18.79 KG/M2

## 2022-12-09 DIAGNOSIS — Z12.31 ENCOUNTER FOR SCREENING MAMMOGRAM FOR MALIGNANT NEOPLASM OF BREAST: ICD-10-CM

## 2023-01-04 ENCOUNTER — HOSPITAL ENCOUNTER (OUTPATIENT)
Dept: RADIOLOGY | Facility: MEDICAL CENTER | Age: 68
Discharge: HOME | End: 2023-01-04
Admitting: PHYSICAL MEDICINE & REHABILITATION

## 2023-01-04 VITALS
RESPIRATION RATE: 18 BRPM | DIASTOLIC BLOOD PRESSURE: 73 MMHG | OXYGEN SATURATION: 96 % | HEART RATE: 69 BPM | SYSTOLIC BLOOD PRESSURE: 122 MMHG | TEMPERATURE: 98.8 F

## 2023-01-04 DIAGNOSIS — M46.1 SACROILIITIS (HCC): ICD-10-CM

## 2023-01-04 RX ORDER — METHYLPREDNISOLONE ACETATE 40 MG/ML
40 INJECTION, SUSPENSION INTRA-ARTICULAR; INTRALESIONAL; INTRAMUSCULAR; PARENTERAL; SOFT TISSUE ONCE
Status: COMPLETED | OUTPATIENT
Start: 2023-01-04 | End: 2023-01-04

## 2023-01-04 RX ORDER — BUPIVACAINE HCL/PF 2.5 MG/ML
1.5 VIAL (ML) INJECTION ONCE
Status: COMPLETED | OUTPATIENT
Start: 2023-01-04 | End: 2023-01-04

## 2023-01-04 RX ADMIN — METHYLPREDNISOLONE ACETATE 40 MG: 40 INJECTION, SUSPENSION INTRA-ARTICULAR; INTRALESIONAL; INTRAMUSCULAR; PARENTERAL; SOFT TISSUE at 11:06

## 2023-01-04 RX ADMIN — Medication 1.5 ML: at 11:06

## 2023-01-04 RX ADMIN — IOHEXOL 0.5 ML: 300 INJECTION, SOLUTION INTRAVENOUS at 11:06

## 2023-01-04 NOTE — H&P
History of Present Illness:  The patient is a 75 y o  female who presents with complaints of right low back pain    Past Medical History:   Diagnosis Date   • Arthritis    • Back pain    • Bipolar disorder ([address] )    • Depression    • GERD (gastroesophageal reflux disease)    • Hypertension     last assessed: 06/29/12   • Sarcoidosis    • 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       Past Surgical History:   Procedure Laterality Date   • BLADDER SURGERY     • CATARACT EXTRACTION, BILATERAL     • COLPORRHAPHY      Anterior, repair of cystocele   • HYSTERECTOMY  1993    at age 43   • OOPHORECTOMY Bilateral 1993    at age 43   • RETINAL DETACHMENT SURGERY Left          Current Outpatient Medications:   •  b complex vitamins capsule, Take 2 capsules by mouth daily, Disp: , Rfl:   •  busPIRone (BUSPAR) 15 mg tablet, Take 1 tablet (15 mg total) by mouth 3 (three) times a day, Disp: 270 tablet, Rfl: 0  •  calcium citrate-vitamin D (CITRACAL+D) 315-200 MG-UNIT per tablet, Take 2 tablets by mouth Twice daily OsteoMatrix, Disp: , Rfl:   •  Carboxymethylcell-Hypromellose (GenTeal) 0 25-0 3 % GEL, Apply to eye daily at bedtime, Disp: , Rfl:   •  carboxymethylcellulose (REFRESH PLUS) 0 5 % SOLN, Apply to eye, Disp: , Rfl:   •  Carboxymethylcellulose Sodium 1 % GEL, Apply to eye, Disp: , Rfl:   •  Cholecalciferol (VITAMIN D3) 1000 units CAPS, Take by mouth, Disp: , Rfl:   •  hyoscyamine (LEVBID) 0 375 mg 12 hr tablet, Take 1 tablet (0 375 mg total) by mouth 2 (two) times a day, Disp: 180 tablet, Rfl: 3  •  loperamide (Anti-Diarrheal) 2 MG tablet, TAKE ONE TABLET BY MOUTH IN THE MORNING AND ONE-HALF TABLET IN THE EVENING, Disp: 144 tablet, Rfl: 3  •  metoprolol succinate (TOPROL-XL) 50 mg 24 hr tablet, Take 0 5 tablets by mouth 2 (two) times a day, Disp: , Rfl:   •  mometasone (NASONEX) 50 mcg/act nasal spray, , Disp: , Rfl:   •  Multiple Vitamin (DAILY VALUE MULTIVITAMIN PO), Take 1 tablet by mouth in the morning, Disp: , Rfl:   •  Omega-3 Fatty Acids (OMEGA-3 FISH OIL) 1200 MG [facility], [place] OmegaGuard fish oil, Disp: , Rfl:   •  ondansetron (ZOFRAN) 4 mg tablet, Take 1 tablet (4 mg total) by mouth every 8 (eight) hours as needed for nausea or vomiting, Disp: 30 tablet, Rfl: 3  •  sertraline (ZOLOFT) 100 mg tablet, Take 2 tablets (200 mg total) by mouth daily, Disp: 180 tablet, Rfl: 0  •  trandolapril (Mavik) 2 MG tablet, Take 1 tablet by mouth daily  , Disp: , Rfl:     Allergies   Allergen Reactions   • Tramadol Itching   • Carvedilol Fever   • Dust Mite Extract    • Influenza Virus Vaccine Visual Disturbance     Reaction Date: 28Feb2012;    • Molds & Smuts    • Juan Grass Pollen Allergen    • Tree Extract        Physical Exam:   Vitals:    01/04/23 1058   BP: 120/64   Pulse: 68   Resp: 18   Temp: 98 8 °F (37 1 °C)   SpO2: 95%     General: Awake, Alert, Oriented x 3, Mood and affect appropriate  Respiratory: Respirations even and unlabored  Cardiovascular: Peripheral pulses intact; no edema  Musculoskeletal Exam: right low back pain    ASA Score: 2    Patient/Chart Verification  Patient ID Verified: Verbal  ID Band Applied: No  Consents Confirmed: Procedural, To be obtained in the Pre-Procedure area  H&P( within 30 days) Verified: To be obtained in the Pre-Procedure area  Allergies Reviewed: Yes  Anticoag/NSAID held?: NA  Currently on antibiotics?: No    Assessment:   1   Sacroiliitis (HCC)        Plan: RT SIJ

## 2023-01-04 NOTE — DISCHARGE INSTRUCTIONS
Steroid Joint Injection   WHAT YOU NEED TO KNOW:   A steroid joint injection is a procedure to inject steroid medicine into a joint  Steroid medicine decreases pain and inflammation  The injection may also contain an anesthetic (numbing medicine) to decrease pain  It may be done to treat conditions such as arthritis, gout, or carpal tunnel syndrome  The injections may be given in your knee, ankle, shoulder, elbow, wrist, ankle or sacroiliac joint  Do not apply heat to any area that is numb  If you have discomfort or soreness at the injection site, you may apply ice today, 20 minutes on and 20 minutes off  Tomorrow you may use ice or warm, moist heat  Do not apply ice or heat directly to the skin  You may have an increase or change in the discomfort for 36-48 hours after your treatment  Apply ice and continue with any pain medicine you have been prescribed  Do not do anything strenuous today  You may shower, but no tub baths or hot tubs today  You may resume your normal activities tomorrow, but do not “overdo it”  Resume normal activities slowly when you are feeling better  If you experience redness, drainage or swelling at the injection site, or if you develop a fever above 100 degrees, please call [facility] at [phone] or go to the Emergency Room  Continue to take all routine medicines prescribed by your primary care physician unless otherwise instructed by our staff  Most blood thinners should be started again according to your regularly scheduled dosing  If you have any questions, please give our office a call  As no general anesthesia was used in today's procedure, you should not experience any side effects related to anesthesia  If you are diabetic, the steroids used in today's injection may temporarily increase your blood sugar levels after the first few days after your injection   Please keep a close eye on your sugars and alert the doctor who manages your diabetes if your sugars are significantly high from your baseline or you are symptomatic  If you have a problem specifically related to your procedure, please call our office at [phone]  Problems not related to your procedure should be directed to your primary care physician

## 2023-01-11 ENCOUNTER — TELEPHONE (OUTPATIENT)
Dept: PAIN MEDICINE | Facility: CLINIC | Age: 68
End: 2023-01-11

## 2023-02-24 ENCOUNTER — OFFICE VISIT (OUTPATIENT)
Dept: PAIN MEDICINE | Facility: MEDICAL CENTER | Age: 68
End: 2023-02-24

## 2023-02-24 VITALS
BODY MASS INDEX: 19.4 KG/M2 | HEART RATE: 72 BPM | WEIGHT: 128 LBS | SYSTOLIC BLOOD PRESSURE: 126 MMHG | HEIGHT: 68 IN | DIASTOLIC BLOOD PRESSURE: 68 MMHG

## 2023-02-24 DIAGNOSIS — M51.16 LUMBAR DISC DISEASE WITH RADICULOPATHY: ICD-10-CM

## 2023-02-24 DIAGNOSIS — M48.061 SPINAL STENOSIS OF LUMBAR REGION WITHOUT NEUROGENIC CLAUDICATION: ICD-10-CM

## 2023-02-24 DIAGNOSIS — M54.16 LUMBAR RADICULOPATHY: ICD-10-CM

## 2023-02-24 DIAGNOSIS — M46.1 SACROILIITIS (HCC): Primary | ICD-10-CM

## 2023-02-24 NOTE — PROGRESS NOTES
Assessment:  1  Sacroiliitis ([address] )    2  Lumbar radiculopathy    3  Lumbar disc disease with radiculopathy    4  Spinal stenosis of lumbar region without neurogenic claudication        Plan:  While the patient was in the office today, I did have a thorough conversation regarding their chronic pain syndrome, medication management, and treatment plan options  The patient's sacroiliac pain has been reduced by approximately 50% since the most recent injection done on 1/4/2023  She feels that her current pain level is manageable and tolerable at this point time  She does report intermittent radicular pain and occasional weakness along the right anterior thigh  I told her to keep an eye on the symptoms and if the radicular component should increase in severity and frequency then I would offer her a transforaminal epidural steroid injection  At this point she will follow-up with us on a as needed basis if the pain changes or worsens  My impressions and treatment recommendations were discussed in detail with the patient who verbalized understanding and had no further questions  Discharge instructions were provided  I personally saw and examined the patient and I agree with the above discussed plan of care  No orders of the defined types were placed in this encounter  No orders of the defined types were placed in this encounter  History of Present Illness:  Ayesha Toro is a 70 y o  female who presents for a follow up office visit in regards to Back Pain  The patient’s current symptoms include chronic low back pain that she currently rates a 4 out of 10 on the pain scale and describes it as an intermittent dull and aching pain primarily on the right side  On 1/4/23 she underwent a right sacroiliac joint injection and reports greater than 50% reduction in that particular pain pattern    She does states that she had occasionally has pain along the right anterior thigh into the knee but states that it is not severe enough to warrant any interventional treatment at this time  The patient brought her MRI report and from 2021 and it does reveal multilevel degenerative changes with varying degrees of central and lateral recess stenosis  I have personally reviewed and/or updated the patient's past medical history, past surgical history, family history, social history, current medications, allergies, and vital signs today  Review of Systems   Respiratory: Negative for shortness of breath  Cardiovascular: Negative for chest pain  Gastrointestinal: Negative for constipation, diarrhea, nausea and vomiting  Musculoskeletal: Positive for joint swelling and myalgias  Negative for arthralgias and gait problem  Skin: Negative for rash  Neurological: Negative for dizziness, seizures and weakness  All other systems reviewed and are negative        Patient Active Problem List   Diagnosis   • Sarcoidosis   • Sacroiliitis ([facility] [address] )   • Posttraumatic stress disorder   • Protein calorie malnutrition ([facility] [address] )   • Primary dilated cardiomyopathy (HCC)       Past Medical History:   Diagnosis Date   • Arthritis    • Back pain    • Bipolar disorder ([facility] [address] )    • Depression    • GERD (gastroesophageal reflux disease)    • Hypertension     last assessed: 06/29/12   • Sarcoidosis    • 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       Past Surgical History:   Procedure Laterality Date   • BLADDER SURGERY     • CATARACT EXTRACTION, BILATERAL     • COLPORRHAPHY      Anterior, repair of cystocele   • HYSTERECTOMY  1993    at age 43   • OOPHORECTOMY Bilateral 1993    at age 43   • RETINAL DETACHMENT SURGERY Left        Family History   Problem Relation Age of Onset   • Aneurysm Mother         Abd aorta aneurysm repair 2 dock limbs w/ visc extension pros   • Hypertension Father    • Subarachnoid hemorrhage Father    • No Known Problems Daughter    • No Known Problems Maternal Grandmother    • No Known Problems Maternal Grandfather    • No Known Problems Paternal Grandmother    • No Known Problems Paternal Grandfather    • No Known Problems Paternal Aunt    • BRCA2 Positive Neg Hx    • BRCA2 Negative Neg Hx    • BRCA1 Positive Neg Hx    • BRCA1 Negative Neg Hx    • BRCA 1/2 Neg Hx    • Endometrial cancer Neg Hx    • Ovarian cancer Neg Hx    • Colon cancer Neg Hx    • Breast cancer additional onset Neg Hx    • Breast cancer Neg Hx        Social History     Occupational History   • Not on file   Tobacco Use   • Smoking status: Never   • Smokeless tobacco: Never   Vaping Use   • Vaping Use: Never used   Substance and Sexual Activity   • Alcohol use: No   • Drug use: No   • Sexual activity: Yes     Partners: Male     Birth control/protection: Post-menopausal       Current Outpatient Medications on File Prior to Visit   Medication Sig   • b complex vitamins capsule Take 2 capsules by mouth daily   • busPIRone (BUSPAR) 15 mg tablet Take 1 tablet (15 mg total) by mouth 3 (three) times a day   • calcium citrate-vitamin D (CITRACAL+D) 315-200 MG-UNIT per tablet Take 2 tablets by mouth Twice daily OsteoMatrix   • Carboxymethylcell-Hypromellose (GenTeal) 0 25-0 3 % GEL Apply to eye daily at bedtime   • carboxymethylcellulose (REFRESH PLUS) 0 5 % SOLN Apply to eye   • Carboxymethylcellulose Sodium 1 % GEL Apply to eye   • Cholecalciferol (VITAMIN D3) 1000 units CAPS Take by mouth   • hyoscyamine (LEVBID) 0 375 mg 12 hr tablet Take 1 tablet (0 375 mg total) by mouth 2 (two) times a day   • loperamide (Anti-Diarrheal) 2 MG tablet TAKE ONE TABLET BY MOUTH IN THE MORNING AND ONE-HALF TABLET IN THE EVENING   • metoprolol succinate (TOPROL-XL) 50 mg 24 hr tablet Take 0 5 tablets by mouth 2 (two) times a day   • mometasone (NASONEX) 50 mcg/act nasal spray    • Multiple Vitamin (DAILY VALUE MULTIVITAMIN PO) Take 1 tablet by mouth in the morning   • Omega-3 Fatty Acids (OMEGA-3 FISH OIL) 1200 MG CAPS Shaklee OmegaGuard fish oil   • ondansetron (ZOFRAN) 4 mg tablet Take 1 tablet (4 mg total) by mouth every 8 (eight) hours as needed for nausea or vomiting   • sertraline (ZOLOFT) 100 mg tablet Take 2 tablets (200 mg total) by mouth daily   • trandolapril (Mavik) 2 MG tablet Take 1 tablet by mouth daily       No current facility-administered medications on file prior to visit  Allergies   Allergen Reactions   • Tramadol Itching   • Carvedilol Fever   • Dust Mite Extract    • Influenza Virus Vaccine Visual Disturbance     Reaction Date: 28Feb2012;    • Molds & Smuts    • Juan Grass Pollen Allergen    • Tree Extract        Physical Exam:    /68   Pulse 72   Ht 5' 8" (1 727 m)   Wt 58 1 kg (128 lb)   BMI 19 46 kg/m²     Constitutional:normal, well developed, well nourished, alert, in no distress and non-toxic and no overt pain behavior    Eyes:anicteric  HEENT:grossly intact  Neck:supple, symmetric, trachea midline and no masses   Pulmonary:even and unlabored  Cardiovascular:No edema or pitting edema present  Skin:Normal without rashes or lesions and well hydrated  Psychiatric:Mood and affect appropriate  Neurologic:Cranial Nerves II-XII grossly intact  Musculoskeletal:normal    Imaging

## 2023-03-15 ENCOUNTER — OFFICE VISIT (OUTPATIENT)
Dept: PSYCHIATRY | Facility: CLINIC | Age: 68
End: 2023-03-15

## 2023-03-15 VITALS — SYSTOLIC BLOOD PRESSURE: 113 MMHG | HEART RATE: 73 BPM | DIASTOLIC BLOOD PRESSURE: 63 MMHG

## 2023-03-15 DIAGNOSIS — F43.10 POSTTRAUMATIC STRESS DISORDER: Primary | ICD-10-CM

## 2023-03-15 DIAGNOSIS — F41.9 ANXIETY: ICD-10-CM

## 2023-03-15 DIAGNOSIS — G93.32 MYALGIC ENCEPHALOMYELITIS/CHRONIC FATIGUE SYNDROME (ME/CFS): ICD-10-CM

## 2023-03-15 DIAGNOSIS — E55.9 VITAMIN D DEFICIENCY: ICD-10-CM

## 2023-03-15 DIAGNOSIS — F33.1 MDD (MAJOR DEPRESSIVE DISORDER), RECURRENT EPISODE, MODERATE (HCC): ICD-10-CM

## 2023-03-15 RX ORDER — SERTRALINE HYDROCHLORIDE 100 MG/1
200 TABLET, FILM COATED ORAL DAILY
Qty: 180 TABLET | Refills: 0 | Status: SHIPPED | OUTPATIENT
Start: 2023-03-15 | End: 2023-06-13

## 2023-03-15 RX ORDER — BUSPIRONE HYDROCHLORIDE 30 MG/1
30 TABLET ORAL 2 TIMES DAILY
Qty: 180 TABLET | Refills: 0 | Status: SHIPPED | OUTPATIENT
Start: 2023-03-15 | End: 2023-06-13

## 2023-03-15 RX ORDER — HYDROXYZINE HYDROCHLORIDE 25 MG/1
25 TABLET, FILM COATED ORAL EVERY 6 HOURS PRN
Qty: 60 TABLET | Refills: 2 | Status: SHIPPED | OUTPATIENT
Start: 2023-03-15

## 2023-03-15 NOTE — PSYCH
MEDICATION MANAGEMENT NOTE        [facility]      Name and Date of Birth:  Angélica Rodriguez 70 y o  1955 MRN: [id number]    Date of Visit: March 15, 2023    Reason for Visit:   Chief Complaint   Patient presents with   • Follow-up       SUBJECTIVE:    Sheeba Madden is seen today for a follow up for Major Depressive Disorder and anxiety  She continues to do fairly well since the last visit  She reports doing very well, reports that anxiety symptoms and depressive symptoms has been fairly stable  She reports experience on and off anxiety symptoms  Patient is currently on Zoloft 200 mg and BuSpar 15 mg t i d  for anxiety and depression  Her depression and anxiety improved significantly since maximizing Zoloft and Buspar doses after last visit  She reports mild and brief anxiety episodes during which she feels tingling and numbness in her hands  These episodes occur in response to stress and when she is worried about her children  She spends her day playing piano or doing coloring books  She denies any suicidal ideation, intent or plan at present; denies any homicidal ideation, intent or plan at present  She denies any auditory hallucinations, denies any visual hallucinations, denies any overt delusions  She denies any side effects from psychiatric medications      HPI ROS Appetite Changes and Sleep:     She reports normal sleep, normal appetite, normal energy level      Review Of Systems:      Constitutional negative   ENT negative   Cardiovascular negative   Respiratory negative   Gastrointestinal negative   Genitourinary negative   Musculoskeletal back pain   Integumentary negative   Neurological negative   Endocrine negative   Other Symptoms none, all other systems are negative         Past Medical History:    Past Medical History:   Diagnosis Date   • Arthritis    • Back pain    • Bipolar disorder ([facility] [address] )    • Depression    • GERD (gastroesophageal reflux disease)    • Hypertension     last assessed: 06/29/12   • Sarcoidosis    • 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        Past Surgical History:   Procedure Laterality Date   • BLADDER SURGERY     • CATARACT EXTRACTION, BILATERAL     • COLPORRHAPHY      Anterior, repair of cystocele   • HYSTERECTOMY  1993    at age 43   • OOPHORECTOMY Bilateral 1993    at age 43   • RETINAL DETACHMENT SURGERY Left      Allergies   Allergen Reactions   • Tramadol Itching   • Carvedilol Fever   • Dust Mite Extract    • Influenza Virus Vaccine Visual Disturbance     Reaction Date: 28Feb2012;    • Molds & Smuts    • Juan Grass Pollen Allergen    • Tree Extract        Substance Abuse History:    Social History     Substance and Sexual Activity   Alcohol Use No     Social History     Substance and Sexual Activity   Drug Use No       Social History:    Social History     Socioeconomic History   • Marital status: /Civil Union     Spouse name: Not on file   • Number of children: Not on file   • Years of education: Not on file   • Highest education level: Not on file   Occupational History   • Not on file   Tobacco Use   • Smoking status: Never   • Smokeless tobacco: Never   Vaping Use   • Vaping Use: Never used   Substance and Sexual Activity   • Alcohol use: No   • Drug use: No   • Sexual activity: Yes     Partners: Male     Birth control/protection: Post-menopausal   Other Topics Concern   • Not on file   Social History Narrative   • Not on file     Social Determinants of Health     Financial Resource Strain: Not on file   Food Insecurity: Not on file   Transportation Needs: Not on file   Physical Activity: Not on file   Stress: Not on file   Social Connections: Not on file   Intimate Partner Violence: Not on file   Housing Stability: Not on file       Family Psychiatric History:     Family History   Problem Relation Age of Onset   • Aneurysm Mother         Abd aorta aneurysm repair 2 dock limbs w/ visc extension pros   • Hypertension Father    • Subarachnoid hemorrhage Father    • No Known Problems Daughter    • No Known Problems Maternal Grandmother    • No Known Problems Maternal Grandfather    • No Known Problems Paternal Grandmother    • No Known Problems Paternal Grandfather    • No Known Problems Paternal Aunt    • BRCA2 Positive Neg Hx    • BRCA2 Negative Neg Hx    • BRCA1 Positive Neg Hx    • BRCA1 Negative Neg Hx    • BRCA 1/2 Neg Hx    • Endometrial cancer Neg Hx    • Ovarian cancer Neg Hx    • Colon cancer Neg Hx    • Breast cancer additional onset Neg Hx    • Breast cancer Neg Hx        History Review:  The following portions of the patient's history were reviewed and updated as appropriate: allergies, current medications, past family history, past medical history, past social history, past surgical history and problem list          OBJECTIVE:     Vital signs in last 24 hours:    Vitals:    03/15/23 1018   BP: 113/63   BP Location: Right arm   Patient Position: Sitting   Cuff Size: Standard   Pulse: 73       Mental Status Evaluation:    Appearance age appropriate, casually dressed   Behavior cooperative, calm   Speech normal rate, normal volume, normal pitch   Mood improved   Affect normal range and intensity, appropriate   Thought Processes organized, goal directed   Associations intact associations   Thought Content no overt delusions   Perceptual Disturbances: no auditory hallucinations, no visual hallucinations   Abnormal Thoughts  Risk Potential Suicidal ideation - None  Homicidal ideation - None  Potential for aggression - No   Orientation oriented to person, place, time/date and situation   Memory recent and remote memory grossly intact   Consciousness alert and awake   Attention Span Concentration Span attention span and concentration are age appropriate   Intellect appears to be of average intelligence   Insight intact   Judgement intact   Muscle Strength and  Gait normal muscle strength and normal muscle tone   Motor activity no abnormal movements   Language no difficulty naming common objects, no difficulty repeating a phrase, no difficulty writing a sentence   Fund of Knowledge adequate knowledge of current events  adequate fund of knowledge regarding past history  adequate fund of knowledge regarding vocabulary    Pain moderate   Pain Scale 5       Laboratory Results: I have personally reviewed all pertinent laboratory/tests results    Most Recent Labs:   Lab Results   Component Value Date    WBC 6 17 06/30/2014    RBC 4 27 06/30/2014    HGB 13 0 06/30/2014    HCT 39 5 06/30/2014     06/30/2014    RDW 13 0 06/30/2014    NEUTROABS 4 69 06/30/2014     12/15/2015    K 4 5 07/16/2022     (H) 07/16/2022    CO2 24 07/16/2022    BUN 23 07/16/2022    CREATININE 1 04 07/16/2022    GLUCOSE 96 12/15/2015    CALCIUM 9 0 07/16/2022    AST 27 07/16/2022    ALT 41 07/16/2022    ALKPHOS 48 07/16/2022    PROT 6 5 12/15/2015    BILITOT 0 30 12/15/2015    CHOLESTEROL 191 07/16/2022    TRIG 112 07/16/2022    HDL 90 07/16/2022    LDLCALC 79 07/16/2022    [place] 101 07/16/2022       Suicide/Homicide Risk Assessment:    Risk of Harm to Self:  The following ratings are based on assessment at the time of the interview and review of records  Based on today's assessment, Isabel Valdez presents the following risk of harm to self: none    Risk of Harm to Others: The following ratings are based on assessment at the time of the interview  Based on today's assessment, Isaebl Valdez presents the following risk of harm to others: minimal    The following interventions are recommended: no intervention changes needed, not applicable    Assessment/Plan:  Isabel Valdez is a 75years old adult patient was seen today for follow-up her anxiety and depressive symptoms  With successfully tapered off her lorazepam because she was getting dependent on it and not recommended for her age    We were able to increase Zoloft and BuSpar to help with her anxiety  She is responding well to the medication changes and has residual anxiety and depression that are very mild in severity  We discussed with him today increasing BuSpar to 50 mg twice a day which is a maximum dose and adding hydroxyzine to be used as needed for anxiety attacks  We will do new blood work to rule out secondary causes of depression and to rule out fatigue vitamin D deficiency        Diagnoses and all orders for this visit:    Posttraumatic stress disorder  -     sertraline (ZOLOFT) 100 mg tablet; Take 2 tablets (200 mg total) by mouth daily  -     busPIRone (BUSPAR) 30 MG tablet; Take 1 tablet (30 mg total) by mouth 2 (two) times a day    MDD (major depressive disorder), recurrent episode, moderate (HCC)  -     CBC and differential; Future  -     Comprehensive metabolic panel; Future  -     TSH, 3rd generation with Free T4 reflex; Future    Anxiety  -     CBC and differential; Future  -     Comprehensive metabolic panel; Future  -     TSH, 3rd generation with Free T4 reflex; Future  -     busPIRone (BUSPAR) 30 MG tablet; Take 1 tablet (30 mg total) by mouth 2 (two) times a day  -     hydrOXYzine HCL (ATARAX) 25 mg tablet; Take 1 tablet (25 mg total) by mouth every 6 (six) hours as needed for anxiety for up to 60 doses    Myalgic encephalomyelitis/chronic fatigue syndrome (ME/CFS)  -     CBC and differential; Future  -     Comprehensive metabolic panel; Future  -     TSH, 3rd generation with Free T4 reflex; Future    Vitamin D deficiency  -     Vitamin D 25 hydroxy; Future          Treatment Recommendations/Precautions:    Continue Zoloft 200 mg daily to improve depressive symptoms  Increase Buspar 30 mg 2 times a day to improve anxiety symptoms  And hydroxyzine as needed for anxiety  Medication changes: I am having Zulema EDWARD Stanton maintain her calcium citrate-vitamin D, Multiple Vitamin (DAILY VALUE MULTIVITAMIN PO), Mavik, metoprolol succinate, Vitamin D3, carboxymethylcellulose, ondansetron, Omega-3 Fish Oil, hyoscyamine, loperamide, mometasone, b complex vitamins, GenTeal, Carboxymethylcellulose Sodium, busPIRone, and sertraline      Current Outpatient Medications:   •  b complex vitamins capsule, Take 2 capsules by mouth daily, Disp: , Rfl:   •  calcium citrate-vitamin D (CITRACAL+D) 315-200 MG-UNIT per tablet, Take 2 tablets by mouth Twice daily OsteoMatrix, Disp: , Rfl:   •  Carboxymethylcell-Hypromellose (GenTeal) 0 25-0 3 % GEL, Apply to eye daily at bedtime, Disp: , Rfl:   •  carboxymethylcellulose (REFRESH PLUS) 0 5 % SOLN, Apply to eye, Disp: , Rfl:   •  Carboxymethylcellulose Sodium 1 % GEL, Apply to eye, Disp: , Rfl:   •  Cholecalciferol (VITAMIN D3) 1000 units CAPS, Take by mouth, Disp: , Rfl:   •  hyoscyamine (LEVBID) 0 375 mg 12 hr tablet, Take 1 tablet (0 375 mg total) by mouth 2 (two) times a day, Disp: 180 tablet, Rfl: 3  •  loperamide (Anti-Diarrheal) 2 MG tablet, TAKE ONE TABLET BY MOUTH IN THE MORNING AND ONE-HALF TABLET IN THE EVENING, Disp: 144 tablet, Rfl: 3  •  metoprolol succinate (TOPROL-XL) 50 mg 24 hr tablet, Take 0 5 tablets by mouth 2 (two) times a day, Disp: , Rfl:   •  mometasone (NASONEX) 50 mcg/act nasal spray, , Disp: , Rfl:   •  Multiple Vitamin (DAILY VALUE MULTIVITAMIN PO), Take 1 tablet by mouth in the morning, Disp: , Rfl:   •  Omega-3 Fatty Acids (OMEGA-3 FISH OIL) 1200 MG CAPS, Shaklee OmegaGuard fish oil, Disp: , Rfl:   •  ondansetron (ZOFRAN) 4 mg tablet, Take 1 tablet (4 mg total) by mouth every 8 (eight) hours as needed for nausea or vomiting, Disp: 30 tablet, Rfl: 3  •  trandolapril (Mavik) 2 MG tablet, Take 1 tablet by mouth daily  , Disp: , Rfl:   •  busPIRone (BUSPAR) 15 mg tablet, Take 1 tablet (15 mg total) by mouth 3 (three) times a day, Disp: 270 tablet, Rfl: 0  •  sertraline (ZOLOFT) 100 mg tablet, Take 2 tablets (200 mg total) by mouth daily, Disp: 180 tablet, Rfl: 0  Isabel Valdez has a current medication list which includes the following prescription(s): b complex vitamins, calcium citrate-vitamin d, genteal, carboxymethylcellulose, carboxymethylcellulose sodium, vitamin d3, hyoscyamine, loperamide, metoprolol succinate, mometasone, multiple vitamin, omega-3 fish oil, ondansetron, mavik, buspirone, and sertraline  Medication management with psychotherapy every 2 months  Aware of 24 hour and weekend coverage for urgent situations accessed by calling [facility] main practice number    Medications Risks/Benefits      Risks, Benefits And Possible Side Effects Of Medications:    Risks, benefits, and possible side effects of medications explained to [facility] and she verbalizes understanding and agreement for treatment  Controlled Medication Discussion:     [facility] has been filling controlled prescriptions on time as prescribed according to [address] [place] [address] [employer] Monitoring Program    Psychotherapy Provided:     Individual psychotherapy provided: Yes  Counseling was provided during the session today for 16 minutes  Medications, treatment progress and treatment plan reviewed with [facility]  Medication changes discussed with [facility]  Medication education provided to [facility]  Importance of medication and treatment compliance reviewed with [facility]  Educated on importance of medication and treatment compliance  Supportive therapy provided  Cognitive therapy was utilized during the session  Reassurance and supportive therapy provided  Reoriented to reality and reassured  Treatment Plan:    Completed and signed during the session: Not applicable - Treatment Plan not due at this session    Note Share:     This note was not shared with the patient due to reasonable likelihood of causing patient harm    Visit start and stop times:    Start Time: 10:00 AM  Stop Time: 10:30 AM    I spent 30 minutes directly with the patient during this visit    Stephanie Grajeda MD 03/15/23

## 2023-03-15 NOTE — BH TREATMENT PLAN
TREATMENT PLAN (Medication Management Only)        [facility]    Name and Date of Birth:  Hazel Mejia 70 y o  1955  Date of Treatment Plan: March 15, 2023  Diagnosis/Diagnoses:    1  Posttraumatic stress disorder    2  MDD (major depressive disorder), recurrent episode, moderate ([facility] [address] )    3  Anxiety    4  Myalgic encephalomyelitis/chronic fatigue syndrome (ME/CFS)    5  Vitamin D deficiency      Strengths/Personal Resources for Self-Care: supportive family, supportive friends, taking medications as prescribed, ability to adapt to life changes, ability to communicate needs, ability to communicate well, ability to listen, ability to reason, ability to understand psychiatric illness, average or above intelligence, family ties, financial means, financial security, general fund of knowledge, good physical health, good understanding of illness, independence, motivation for treatment, ability to negotiate basic needs, Confucianism affiliation, being resoureceful, self-reliance, sense of humor, special hobby/interest, stable employment, strong pankaj, well educated, willingness to work on problems, work skills  Area/Areas of need (in own words): anxiety symptoms  1  Long Term Goal: improve anxiety  Target Date:6 months - 9/15/2023  Person/Persons responsible for completion of goal: Zach White  2  Short Term Objective (s) - How will we reach this goal?:   A  Provider new recommended medication/dosage changes and/or continue medication(s): continue current medications as prescribed Zoloft, Buspar, Atarax  B  Spend more time with friends and family  C  Attend psychotherapy regularly    Target Date:6 months - 9/15/2023  Person/Persons Responsible for Completion of Goal: Zach White  Progress Towards Goals: continuing treatment  Treatment Modality: medication management with psychotherapy every 3 month, referral for individual psychotherapy  Review due 180 days from date of this plan: 6 months - 9/15/2023  Expected length of service: maintenance  My Physician and I have developed this plan together and I agree to work on the goals and objectives  I understand the treatment goals that were developed for my treatment

## 2023-03-18 ENCOUNTER — APPOINTMENT (OUTPATIENT)
Dept: LAB | Facility: HOSPITAL | Age: 68
End: 2023-03-18
Attending: STUDENT IN AN ORGANIZED HEALTH CARE EDUCATION/TRAINING PROGRAM

## 2023-03-18 DIAGNOSIS — F41.9 ANXIETY: ICD-10-CM

## 2023-03-18 DIAGNOSIS — G93.32 MYALGIC ENCEPHALOMYELITIS/CHRONIC FATIGUE SYNDROME (ME/CFS): ICD-10-CM

## 2023-03-18 DIAGNOSIS — F33.1 MDD (MAJOR DEPRESSIVE DISORDER), RECURRENT EPISODE, MODERATE (HCC): ICD-10-CM

## 2023-03-18 DIAGNOSIS — E55.9 VITAMIN D DEFICIENCY: ICD-10-CM

## 2023-03-18 LAB
25(OH)D3 SERPL-MCNC: 67.2 NG/ML (ref 30–100)
ALBUMIN SERPL BCP-MCNC: 4.2 G/DL (ref 3.5–5)
ALP SERPL-CCNC: 49 U/L (ref 34–104)
ALT SERPL W P-5'-P-CCNC: 21 U/L (ref 7–52)
ANION GAP SERPL CALCULATED.3IONS-SCNC: 7 MMOL/L (ref 4–13)
AST SERPL W P-5'-P-CCNC: 23 U/L (ref 13–39)
BASOPHILS # BLD AUTO: 0.04 THOUSANDS/ÂΜL (ref 0–0.1)
BASOPHILS NFR BLD AUTO: 1 % (ref 0–1)
BILIRUB SERPL-MCNC: 0.55 MG/DL (ref 0.2–1)
BUN SERPL-MCNC: 28 MG/DL (ref 5–25)
CALCIUM SERPL-MCNC: 9 MG/DL (ref 8.4–10.2)
CHLORIDE SERPL-SCNC: 103 MMOL/L (ref 96–108)
CO2 SERPL-SCNC: 28 MMOL/L (ref 21–32)
CREAT SERPL-MCNC: 0.88 MG/DL (ref 0.6–1.3)
EOSINOPHIL # BLD AUTO: 0.11 THOUSAND/ÂΜL (ref 0–0.61)
EOSINOPHIL NFR BLD AUTO: 2 % (ref 0–6)
ERYTHROCYTE [DISTWIDTH] IN BLOOD BY AUTOMATED COUNT: 12.3 % (ref 11.6–15.1)
GFR SERPL CREATININE-BSD FRML MDRD: 67 ML/MIN/1.73SQ M
GLUCOSE P FAST SERPL-MCNC: 98 MG/DL (ref 65–99)
HCT VFR BLD AUTO: 40.8 % (ref 34.8–46.1)
HGB BLD-MCNC: 13.5 G/DL (ref 11.5–15.4)
IMM GRANULOCYTES # BLD AUTO: 0.01 THOUSAND/UL (ref 0–0.2)
IMM GRANULOCYTES NFR BLD AUTO: 0 % (ref 0–2)
LYMPHOCYTES # BLD AUTO: 1.06 THOUSANDS/ÂΜL (ref 0.6–4.47)
LYMPHOCYTES NFR BLD AUTO: 23 % (ref 14–44)
MCH RBC QN AUTO: 33.2 PG (ref 26.8–34.3)
MCHC RBC AUTO-ENTMCNC: 33.1 G/DL (ref 31.4–37.4)
MCV RBC AUTO: 100 FL (ref 82–98)
MONOCYTES # BLD AUTO: 0.49 THOUSAND/ÂΜL (ref 0.17–1.22)
MONOCYTES NFR BLD AUTO: 11 % (ref 4–12)
NEUTROPHILS # BLD AUTO: 2.83 THOUSANDS/ÂΜL (ref 1.85–7.62)
NEUTS SEG NFR BLD AUTO: 63 % (ref 43–75)
NRBC BLD AUTO-RTO: 0 /100 WBCS
PLATELET # BLD AUTO: 205 THOUSANDS/UL (ref 149–390)
PMV BLD AUTO: 9.1 FL (ref 8.9–12.7)
POTASSIUM SERPL-SCNC: 4.1 MMOL/L (ref 3.5–5.3)
PROT SERPL-MCNC: 6.2 G/DL (ref 6.4–8.4)
RBC # BLD AUTO: 4.07 MILLION/UL (ref 3.81–5.12)
SODIUM SERPL-SCNC: 138 MMOL/L (ref 135–147)
TSH SERPL DL<=0.05 MIU/L-ACNC: 3.9 UIU/ML (ref 0.45–4.5)
WBC # BLD AUTO: 4.54 THOUSAND/UL (ref 4.31–10.16)

## 2023-05-18 ENCOUNTER — OFFICE VISIT (OUTPATIENT)
Dept: PSYCHIATRY | Facility: CLINIC | Age: 68
End: 2023-05-18

## 2023-05-18 VITALS — SYSTOLIC BLOOD PRESSURE: 121 MMHG | DIASTOLIC BLOOD PRESSURE: 69 MMHG

## 2023-05-18 DIAGNOSIS — F33.41 MDD (MAJOR DEPRESSIVE DISORDER), RECURRENT, IN PARTIAL REMISSION (HCC): Primary | ICD-10-CM

## 2023-05-18 DIAGNOSIS — F43.10 POSTTRAUMATIC STRESS DISORDER: ICD-10-CM

## 2023-05-18 RX ORDER — SERTRALINE HYDROCHLORIDE 100 MG/1
200 TABLET, FILM COATED ORAL DAILY
Qty: 180 TABLET | Refills: 0 | Status: SHIPPED | OUTPATIENT
Start: 2023-05-18 | End: 2023-08-16

## 2023-05-18 RX ORDER — HYDROXYZINE HYDROCHLORIDE 10 MG/1
10 TABLET, FILM COATED ORAL EVERY 6 HOURS PRN
Qty: 90 TABLET | Refills: 0 | Status: SHIPPED | OUTPATIENT
Start: 2023-05-18 | End: 2023-08-16

## 2023-05-18 RX ORDER — BUSPIRONE HYDROCHLORIDE 30 MG/1
30 TABLET ORAL 2 TIMES DAILY
Qty: 180 TABLET | Refills: 0 | Status: SHIPPED | OUTPATIENT
Start: 2023-05-18 | End: 2023-08-16

## 2023-05-18 NOTE — PSYCH
MEDICATION MANAGEMENT NOTE        [address]      Name and Date of Birth:  Leela Smith 70 y o  1955 MRN: [id number]    Date of Visit: May 18, 2023    Reason for Visit:   Chief Complaint   Patient presents with   • Follow-up       SUBJECTIVE:    Epifanio Yanes is seen today for a follow up for Major Depressive Disorder and anxiety  She continues to do fairly well since the last visit  She reports doing very well, reports that anxiety symptoms and depressive symptoms has been fairly stable  She reports experience on and off anxiety symptoms  Patient is currently on Zoloft 200 mg and BuSpar 30 mg twice a day for anxiety and depression  She is also on hydroxyzine 25 mg as needed for anxiety however medication makes her drowsy and she only takes half or one quarter of the pill needed  her depression and anxiety improved significantly since maximizing Zoloft and Buspar doses after last visit  She reports mild and brief anxiety episodes   These episodes occur in response to stress and when she is worried about her children  She spends her day playing piano or doing coloring books  She denies any suicidal ideation, intent or plan at present; denies any homicidal ideation, intent or plan at present  She denies any auditory hallucinations, denies any visual hallucinations, denies any overt delusions  She denies any side effects from psychiatric medications      HPI ROS Appetite Changes and Sleep:     She reports normal sleep, normal appetite, normal energy level      Review Of Systems:      Constitutional negative   ENT negative   Cardiovascular negative   Respiratory negative   Gastrointestinal negative   Genitourinary negative   Musculoskeletal back pain   Integumentary negative   Neurological negative   Endocrine negative   Other Symptoms none, all other systems are negative         Past Medical History:    Past Medical History:   Diagnosis Date   • Arthritis    • Back pain    • Bipolar disorder (HCC)    • Depression    • GERD (gastroesophageal reflux disease)    • Hypertension     last assessed: 06/29/12   • Sarcoidosis    • 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        Past Surgical History:   Procedure Laterality Date   • BLADDER SURGERY     • CATARACT EXTRACTION, BILATERAL     • COLPORRHAPHY      Anterior, repair of cystocele   • HYSTERECTOMY  1993    at age 43   • OOPHORECTOMY Bilateral 1993    at age 43   • RETINAL DETACHMENT SURGERY Left      Allergies   Allergen Reactions   • Tramadol Itching   • Carvedilol Fever   • Dust Mite Extract    • Influenza Virus Vaccine Visual Disturbance     Reaction Date: 28Feb2012;    • Molds & Smuts    • Juan Grass Pollen Allergen    • Tree Extract        Substance Abuse History:    Social History     Substance and Sexual Activity   Alcohol Use No     Social History     Substance and Sexual Activity   Drug Use No       Social History:    Social History     Socioeconomic History   • Marital status: /Civil Union     Spouse name: Not on file   • Number of children: Not on file   • Years of education: Not on file   • Highest education level: Not on file   Occupational History   • Not on file   Tobacco Use   • Smoking status: Never   • Smokeless tobacco: Never   Vaping Use   • Vaping Use: Never used   Substance and Sexual Activity   • Alcohol use: No   • Drug use: No   • Sexual activity: Yes     Partners: Male     Birth control/protection: Post-menopausal   Other Topics Concern   • Not on file   Social History Narrative   • Not on file     Social Determinants of Health     Financial Resource Strain: Not on file   Food Insecurity: Not on file   Transportation Needs: Not on file   Physical Activity: Not on file   Stress: Not on file   Social Connections: Not on file   Intimate Partner Violence: Not on file   Housing Stability: Not on file       Family Psychiatric History:     Family History Problem Relation Age of Onset   • Aneurysm Mother         Abd aorta aneurysm repair 2 dock limbs w/ visc extension pros   • Hypertension Father    • Subarachnoid hemorrhage Father    • No Known Problems Daughter    • No Known Problems Maternal Grandmother    • No Known Problems Maternal Grandfather    • No Known Problems Paternal Grandmother    • No Known Problems Paternal Grandfather    • No Known Problems Paternal Aunt    • BRCA2 Positive Neg Hx    • BRCA2 Negative Neg Hx    • BRCA1 Positive Neg Hx    • BRCA1 Negative Neg Hx    • BRCA 1/2 Neg Hx    • Endometrial cancer Neg Hx    • Ovarian cancer Neg Hx    • Colon cancer Neg Hx    • Breast cancer additional onset Neg Hx    • Breast cancer Neg Hx        History Review:  The following portions of the patient's history were reviewed and updated as appropriate: allergies, current medications, past family history, past medical history, past social history, past surgical history and problem list          OBJECTIVE:     Vital signs in last 24 hours:    Vitals:    05/18/23 0937   BP: 121/69   BP Location: Right arm   Patient Position: Sitting   Cuff Size: Standard       Mental Status Evaluation:    Appearance age appropriate, casually dressed   Behavior cooperative, calm   Speech normal rate, normal volume, normal pitch   Mood improved   Affect normal range and intensity, appropriate   Thought Processes organized, goal directed   Associations intact associations   Thought Content no overt delusions   Perceptual Disturbances: no auditory hallucinations, no visual hallucinations   Abnormal Thoughts  Risk Potential Suicidal ideation - None  Homicidal ideation - None  Potential for aggression - No   Orientation oriented to person, place, time/date and situation   Memory recent and remote memory grossly intact   Consciousness alert and awake   Attention Span Concentration Span attention span and concentration are age appropriate   Intellect appears to be of average intelligence   Insight intact   Judgement intact   Muscle Strength and  Gait normal muscle strength and normal muscle tone   Motor activity no abnormal movements   Language no difficulty naming common objects, no difficulty repeating a phrase, no difficulty writing a sentence   Fund of Knowledge adequate knowledge of current events  adequate fund of knowledge regarding past history  adequate fund of knowledge regarding vocabulary    Pain moderate   Pain Scale 5       Laboratory Results: I have personally reviewed all pertinent laboratory/tests results    Most Recent Labs:   Lab Results   Component Value Date    WBC 4 54 03/18/2023    RBC 4 07 03/18/2023    HGB 13 5 03/18/2023    HCT 40 8 03/18/2023     03/18/2023    RDW 12 3 03/18/2023    NEUTROABS 2 83 03/18/2023     12/15/2015    K 4 1 03/18/2023     03/18/2023    CO2 28 03/18/2023    BUN 28 (H) 03/18/2023    CREATININE 0 88 03/18/2023    GLUCOSE 96 12/15/2015    CALCIUM 9 0 03/18/2023    AST 23 03/18/2023    ALT 21 03/18/2023    ALKPHOS 49 03/18/2023    PROT 6 5 12/15/2015    BILITOT 0 30 12/15/2015    CHOLESTEROL 191 07/16/2022    TRIG 112 07/16/2022    HDL 90 07/16/2022    LDLCALC 79 07/16/2022    [place] 101 07/16/2022    BUA6JFLPYDAY 3 898 03/18/2023       Suicide/Homicide Risk Assessment:    Risk of Harm to Self:  The following ratings are based on assessment at the time of the interview and review of records  Based on today's assessment, Nataliia Coughlin presents the following risk of harm to self: none    Risk of Harm to Others: The following ratings are based on assessment at the time of the interview  Based on today's assessment, Nataliia Coughlin presents the following risk of harm to others: minimal    The following interventions are recommended: no intervention changes needed, not applicable    Assessment/Plan:  Nataliia Coughlin is a 75years old adult patient was seen today for follow-up her anxiety and depressive symptoms    With successfully tapered off her lorazepam because she was getting dependent on it and not recommended for her age  We were able to increase Zoloft and BuSpar to help with her anxiety  She is responding well to the medication changes and has residual anxiety and depression that are very mild in severity  The blood work is within normal limits including her vitamin D level  We will continue Zoloft and BuSpar at maximum doses to continue in remission and we will adjust hydroxyzine dose to help with occasional anxiety without sedation  Also will send a referral to [facility] program for psychotherapy for anxiety      Diagnoses and all orders for this visit:    MDD (major depressive disorder), recurrent, in partial remission ([facility] [address] )    Posttraumatic stress disorder  -     busPIRone (BUSPAR) 30 MG tablet; Take 1 tablet (30 mg total) by mouth 2 (two) times a day  -     sertraline (ZOLOFT) 100 mg tablet; Take 2 tablets (200 mg total) by mouth daily  -     hydrOXYzine HCL (ATARAX) 10 mg tablet; Take 1 tablet (10 mg total) by mouth every 6 (six) hours as needed for anxiety          Treatment Recommendations/Precautions:    Continue Zoloft 200 mg daily to improve depressive symptoms  Continue Buspar 30 mg 2 times a day to improve anxiety symptoms  Decrease hydroxyzine down to 10 mg as needed for anxiety  Medication changes: I am having Zulema Stanton maintain her calcium citrate-vitamin D, Multiple Vitamin (DAILY VALUE MULTIVITAMIN PO), Mavik, metoprolol succinate, Vitamin D3, carboxymethylcellulose, ondansetron, Omega-3 Fish Oil, hyoscyamine, loperamide, mometasone, b complex vitamins, GenTeal, Carboxymethylcellulose Sodium, sertraline, busPIRone, and hydrOXYzine HCL      Current Outpatient Medications:   •  b complex vitamins capsule, Take 2 capsules by mouth daily, Disp: , Rfl:   •  busPIRone (BUSPAR) 30 MG tablet, Take 1 tablet (30 mg total) by mouth 2 (two) times a day, Disp: 180 tablet, Rfl: 0  •  calcium citrate-vitamin D (CITRACAL+D) 315-200 MG-UNIT per tablet, Take 2 tablets by mouth Twice daily OsteoMatrix, Disp: , Rfl:   •  Carboxymethylcell-Hypromellose (GenTeal) 0 25-0 3 % GEL, Apply to eye daily at bedtime, Disp: , Rfl:   •  carboxymethylcellulose (REFRESH PLUS) 0 5 % SOLN, Apply to eye, Disp: , Rfl:   •  Carboxymethylcellulose Sodium 1 % GEL, Apply to eye, Disp: , Rfl:   •  Cholecalciferol (VITAMIN D3) 1000 units CAPS, Take by mouth, Disp: , Rfl:   •  hydrOXYzine HCL (ATARAX) 25 mg tablet, Take 1 tablet (25 mg total) by mouth every 6 (six) hours as needed for anxiety for up to 60 doses, Disp: 60 tablet, Rfl: 2  •  hyoscyamine (LEVBID) 0 375 mg 12 hr tablet, Take 1 tablet (0 375 mg total) by mouth 2 (two) times a day, Disp: 180 tablet, Rfl: 3  •  loperamide (Anti-Diarrheal) 2 MG tablet, TAKE ONE TABLET BY MOUTH IN THE MORNING AND ONE-HALF TABLET IN THE EVENING, Disp: 144 tablet, Rfl: 3  •  metoprolol succinate (TOPROL-XL) 50 mg 24 hr tablet, Take 0 5 tablets by mouth 2 (two) times a day, Disp: , Rfl:   •  mometasone (NASONEX) 50 mcg/act nasal spray, , Disp: , Rfl:   •  Multiple Vitamin (DAILY VALUE MULTIVITAMIN PO), Take 1 tablet by mouth in the morning, Disp: , Rfl:   •  Omega-3 Fatty Acids (OMEGA-3 FISH OIL) 1200 MG CAPS, [place] OmegaGuard fish oil, Disp: , Rfl:   •  ondansetron (ZOFRAN) 4 mg tablet, Take 1 tablet (4 mg total) by mouth every 8 (eight) hours as needed for nausea or vomiting, Disp: 30 tablet, Rfl: 3  •  sertraline (ZOLOFT) 100 mg tablet, Take 2 tablets (200 mg total) by mouth daily, Disp: 180 tablet, Rfl: 0  •  trandolapril (Mavik) 2 MG tablet, Take 1 tablet by mouth daily  , Disp: , Rfl:   Oni Carbajal has a current medication list which includes the following prescription(s): b complex vitamins, buspirone, calcium citrate-vitamin d, genteal, carboxymethylcellulose, carboxymethylcellulose sodium, vitamin d3, hydroxyzine hcl, hyoscyamine, loperamide, metoprolol succinate, mometasone, multiple vitamin, omega-3 fish oil, ondansetron, sertraline, and mavik  Medication management with psychotherapy every 2 months  Aware of 24 hour and weekend coverage for urgent situations accessed by calling [facility] main practice number    Medications Risks/Benefits      Risks, Benefits And Possible Side Effects Of Medications:    Risks, benefits, and possible side effects of medications explained to [facility] and she verbalizes understanding and agreement for treatment  Controlled Medication Discussion:     [facility] has been filling controlled prescriptions on time as prescribed according to [address] [place] [address] Monitoring Program    Psychotherapy Provided:     Individual psychotherapy provided: Yes  Counseling was provided during the session today for 16 minutes  Medications, treatment progress and treatment plan reviewed with [facility]  Medication changes discussed with [facility]  Medication education provided to [facility]  Importance of medication and treatment compliance reviewed with [facility]  Educated on importance of medication and treatment compliance  Supportive therapy provided  Cognitive therapy was utilized during the session  Reassurance and supportive therapy provided  Reoriented to reality and reassured  Treatment Plan:    Completed and signed during the session: Not applicable - Treatment Plan not due at this session    Note Share:     This note was not shared with the patient due to reasonable likelihood of causing patient harm    Visit start and stop times:    Start Time: 9:35 AM  Stop Time: 10:00 AM    I spent 25 minutes directly with the patient during this visit    Linda Vaz MD 05/18/23

## 2023-05-19 ENCOUNTER — TELEPHONE (OUTPATIENT)
Dept: PSYCHOLOGY | Facility: CLINIC | Age: 68
End: 2023-05-19

## 2023-06-10 DIAGNOSIS — K58.0 IRRITABLE BOWEL SYNDROME WITH DIARRHEA: ICD-10-CM

## 2023-06-12 RX ORDER — LOPERAMIDE HYDROCHLORIDE 2 MG/1
TABLET ORAL
Qty: 144 TABLET | Refills: 0 | Status: SHIPPED | OUTPATIENT
Start: 2023-06-12

## 2023-06-25 DIAGNOSIS — K58.0 IRRITABLE BOWEL SYNDROME WITH DIARRHEA: ICD-10-CM

## 2023-06-27 RX ORDER — ONDANSETRON 4 MG/1
4 TABLET, FILM COATED ORAL EVERY 8 HOURS PRN
Qty: 30 TABLET | Refills: 0 | Status: SHIPPED | OUTPATIENT
Start: 2023-06-27

## 2023-07-05 ENCOUNTER — RA CDI HCC (OUTPATIENT)
Dept: OTHER | Facility: HOSPITAL | Age: 68
End: 2023-07-05

## 2023-07-05 NOTE — PROGRESS NOTES
[facility] coding opportunities       Chart reviewed, no opportunity found: CHART REVIEWED, NO OPPORTUNITY FOUND        Patients Insurance     Medicare Insurance: Medicare

## 2023-07-08 DIAGNOSIS — K58.0 IRRITABLE BOWEL SYNDROME WITH DIARRHEA: ICD-10-CM

## 2023-07-08 RX ORDER — HYOSCYAMINE SULFATE EXTENDED-RELEASE 0.38 MG/1
0.38 TABLET ORAL 2 TIMES DAILY
Qty: 180 TABLET | Refills: 0 | Status: SHIPPED | OUTPATIENT
Start: 2023-07-08

## 2023-07-11 ENCOUNTER — OFFICE VISIT (OUTPATIENT)
Dept: FAMILY MEDICINE CLINIC | Facility: CLINIC | Age: 68
End: 2023-07-11
Payer: MEDICARE

## 2023-07-11 VITALS
SYSTOLIC BLOOD PRESSURE: 116 MMHG | HEIGHT: 68 IN | BODY MASS INDEX: 20.22 KG/M2 | WEIGHT: 133.4 LBS | OXYGEN SATURATION: 97 % | DIASTOLIC BLOOD PRESSURE: 72 MMHG | HEART RATE: 71 BPM | TEMPERATURE: 97.1 F | RESPIRATION RATE: 18 BRPM

## 2023-07-11 DIAGNOSIS — Z00.00 MEDICARE ANNUAL WELLNESS VISIT, SUBSEQUENT: Primary | ICD-10-CM

## 2023-07-11 DIAGNOSIS — I42.0 PRIMARY DILATED CARDIOMYOPATHY (HCC): ICD-10-CM

## 2023-07-11 DIAGNOSIS — N18.30 STAGE 3 CHRONIC KIDNEY DISEASE, UNSPECIFIED WHETHER STAGE 3A OR 3B CKD (HCC): ICD-10-CM

## 2023-07-11 DIAGNOSIS — E78.2 MIXED HYPERLIPIDEMIA: ICD-10-CM

## 2023-07-11 PROCEDURE — G0439 PPPS, SUBSEQ VISIT: HCPCS | Performed by: FAMILY MEDICINE

## 2023-07-11 NOTE — ASSESSMENT & PLAN NOTE
Lab Results   Component Value Date    EGFR 67 03/18/2023    EGFR 55 07/16/2022    EGFR 58 07/18/2021    CREATININE 0.88 03/18/2023    CREATININE 1.04 07/16/2022    CREATININE 1.01 07/18/2021

## 2023-07-11 NOTE — PATIENT INSTRUCTIONS
Medicare Preventive Visit Patient Instructions  Thank you for completing your Welcome to Medicare Visit or Medicare Annual Wellness Visit today. Your next wellness visit will be due in one year (7/11/2024). The screening/preventive services that you may require over the next 5-10 years are detailed below. Some tests may not apply to you based off risk factors and/or age. Screening tests ordered at today's visit but not completed yet may show as past due. Also, please note that scanned in results may not display below. Preventive Screenings:  Service Recommendations Previous Testing/Comments   Colorectal Cancer Screening  * Colonoscopy    * Fecal Occult Blood Test (FOBT)/Fecal Immunochemical Test (FIT)  * Fecal DNA/Cologuard Test  * Flexible Sigmoidoscopy Age: 43-73 years old   Colonoscopy: every 10 years (may be performed more frequently if at higher risk)  OR  FOBT/FIT: every 1 year  OR  Cologuard: every 3 years  OR  Sigmoidoscopy: every 5 years  Screening may be recommended earlier than age 39 if at higher risk for colorectal cancer. Also, an individualized decision between you and your healthcare provider will decide whether screening between the ages of 77-80 would be appropriate. Colonoscopy: 08/07/2018  FOBT/FIT: Not on file  Cologuard: Not on file  Sigmoidoscopy: Not on file    Screening Current     Breast Cancer Screening Age: 36 years old  Frequency: every 1-2 years  Not required if history of left and right mastectomy Mammogram: 12/09/2022    Screening Current   Cervical Cancer Screening Between the ages of 21-29, pap smear recommended once every 3 years. Between the ages of 32-69, can perform pap smear with HPV co-testing every 5 years.    Recommendations may differ for women with a history of total hysterectomy, cervical cancer, or abnormal pap smears in past. Pap Smear: Not on file    Screening Not Indicated   Hepatitis C Screening Once for adults born between 1945 and 1965  More frequently in patients at high risk for Hepatitis C Hep C Antibody: 12/12/2016    Screening Current   Diabetes Screening 1-2 times per year if you're at risk for diabetes or have pre-diabetes Fasting glucose: 98 mg/dL (3/18/2023)  A1C: No results in last 5 years (No results in last 5 years)  Screening Current   Cholesterol Screening Once every 5 years if you don't have a lipid disorder. May order more often based on risk factors. Lipid panel: 07/16/2022    Screening Current     Other Preventive Screenings Covered by Medicare:  1. Abdominal Aortic Aneurysm (AAA) Screening: covered once if your at risk. You're considered to be at risk if you have a family history of AAA. 2. Lung Cancer Screening: covers low dose CT scan once per year if you meet all of the following conditions: (1) Age 48-67; (2) No signs or symptoms of lung cancer; (3) Current smoker or have quit smoking within the last 15 years; (4) You have a tobacco smoking history of at least 20 pack years (packs per day multiplied by number of years you smoked); (5) You get a written order from a healthcare provider. 3. Glaucoma Screening: covered annually if you're considered high risk: (1) You have diabetes OR (2) Family history of glaucoma OR (3)  aged 48 and older OR (3)  American aged 72 and older  3. Osteoporosis Screening: covered every 2 years if you meet one of the following conditions: (1) You're estrogen deficient and at risk for osteoporosis based off medical history and other findings; (2) Have a vertebral abnormality; (3) On glucocorticoid therapy for more than 3 months; (4) Have primary hyperparathyroidism; (5) On osteoporosis medications and need to assess response to drug therapy. · Last bone density test (DXA Scan): 10/21/2020.  5. HIV Screening: covered annually if you're between the age of 15-65. Also covered annually if you are younger than 13 and older than 72 with risk factors for HIV infection.  For pregnant patients, it is covered up to 3 times per pregnancy. Immunizations:  Immunization Recommendations   Influenza Vaccine Annual influenza vaccination during flu season is recommended for all persons aged >= 6 months who do not have contraindications   Pneumococcal Vaccine   * Pneumococcal conjugate vaccine = PCV13 (Prevnar 13), PCV15 (Vaxneuvance), PCV20 (Prevnar 20)  * Pneumococcal polysaccharide vaccine = PPSV23 (Pneumovax) Adults 20-63 years old: 1-3 doses may be recommended based on certain risk factors  Adults 72 years old: 1-2 doses may be recommended based off what pneumonia vaccine you previously received   Hepatitis B Vaccine 3 dose series if at intermediate or high risk (ex: diabetes, end stage renal disease, liver disease)   Tetanus (Td) Vaccine - COST NOT COVERED BY MEDICARE PART B Following completion of primary series, a booster dose should be given every 10 years to maintain immunity against tetanus. Td may also be given as tetanus wound prophylaxis. Tdap Vaccine - COST NOT COVERED BY MEDICARE PART B Recommended at least once for all adults. For pregnant patients, recommended with each pregnancy. Shingles Vaccine (Shingrix) - COST NOT COVERED BY MEDICARE PART B  2 shot series recommended in those aged 48 and above     Health Maintenance Due:      Topic Date Due   • DXA SCAN  10/21/2022   • Colorectal Cancer Screening  08/07/2023   • Breast Cancer Screening: Mammogram  12/09/2023   • Hepatitis C Screening  Completed     Immunizations Due:      Topic Date Due   • COVID-19 Vaccine (1) Never done   • Pneumococcal Vaccine: 65+ Years (1 - PCV) Never done     Advance Directives   What are advance directives? Advance directives are legal documents that state your wishes and plans for medical care. These plans are made ahead of time in case you lose your ability to make decisions for yourself. Advance directives can apply to any medical decision, such as the treatments you want, and if you want to donate organs.    What are the types of advance directives? There are many types of advance directives, and each state has rules about how to use them. You may choose a combination of any of the following:  · Living will: This is a written record of the treatment you want. You can also choose which treatments you do not want, which to limit, and which to stop at a certain time. This includes surgery, medicine, IV fluid, and tube feedings. · Durable power of  for healthcare [facility]): This is a written record that states who you want to make healthcare choices for you when you are unable to make them for yourself. This person, called a proxy, is usually a family member or a friend. You may choose more than 1 proxy. · Do not resuscitate (DNR) order:  A DNR order is used in case your heart stops beating or you stop breathing. It is a request not to have certain forms of treatment, such as CPR. A DNR order may be included in other types of advance directives. · Medical directive: This covers the care that you want if you are in a coma, near death, or unable to make decisions for yourself. You can list the treatments you want for each condition. Treatment may include pain medicine, surgery, blood transfusions, dialysis, IV or tube feedings, and a ventilator (breathing machine). · Values history: This document has questions about your views, beliefs, and how you feel and think about life. This information can help others choose the care that you would choose. Why are advance directives important? An advance directive helps you control your care. Although spoken wishes may be used, it is better to have your wishes written down. Spoken wishes can be misunderstood, or not followed. Treatments may be given even if you do not want them. An advance directive may make it easier for your family to make difficult choices about your care.        © Copyright [employer] 2018 Information is for End User's use only and may not be sold, redistributed or otherwise used for commercial purposes.  All illustrations and images included in CareNotes® are the copyrighted property of [employer].A[employer]M., Inc[employer] or [address] [place] [address]

## 2023-07-11 NOTE — PROGRESS NOTES
Assessment and Plan:     Problem List Items Addressed This Visit    None      Depression Screening and Follow-up Plan: Patient was screened for depression during today's encounter. They screened negative with a PHQ-2 score of 0. Preventive health issues were discussed with patient, and age appropriate screening tests were ordered as noted in patient's After Visit Summary. Personalized health advice and appropriate referrals for health education or preventive services given if needed, as noted in patient's After Visit Summary. History of Present Illness:     Patient presents for a Medicare Wellness Visit    Mrs. Yohana Deleon is here today for the purposes of a Medicare well visit any acute issues that are identified will be addressed     Patient Care Team:  Nisha Curiel, DO as PCP - General     Review of Systems:     Review of Systems   Constitutional: Negative for activity change, appetite change, diaphoresis, fatigue and fever. HENT: Negative. Negative for dental problem and hearing loss. Eyes: Negative. Negative for visual disturbance. Patient has had cataracts removed she does not have glaucoma or macular degeneration   Respiratory: Negative for apnea, cough, chest tightness, shortness of breath and wheezing. Cardiovascular: Negative for chest pain, palpitations and leg swelling. Gastrointestinal: Negative for abdominal distention, abdominal pain, anal bleeding, constipation, diarrhea, nausea and vomiting. Endocrine: Negative for cold intolerance, heat intolerance, polydipsia, polyphagia and polyuria. Genitourinary: Negative for difficulty urinating, dysuria, flank pain, hematuria and urgency. Musculoskeletal: Positive for arthralgias. Negative for back pain, gait problem, joint swelling and myalgias. Skin: Negative for color change, rash and wound. Allergic/Immunologic: Negative for environmental allergies, food allergies and immunocompromised state.    Neurological: Negative for dizziness, seizures, syncope, speech difficulty, numbness and headaches. Hematological: Negative for adenopathy. Does not bruise/bleed easily. Psychiatric/Behavioral: Negative for agitation, behavioral problems, hallucinations, sleep disturbance and suicidal ideas. The patient is nervous/anxious.          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     Past Surgical History:   Procedure Laterality Date   • BLADDER SURGERY     • CATARACT EXTRACTION, BILATERAL     • COLPORRHAPHY      Anterior, repair of cystocele   • HYSTERECTOMY  1993    at age 43   • OOPHORECTOMY Bilateral 1993    at age 43   • RETINAL DETACHMENT SURGERY Left       Family History:     Family History   Problem Relation Age of Onset   • Aneurysm Mother         Abd aorta aneurysm repair 2 dock limbs w/ visc extension pros   • Hypertension Father    • Subarachnoid hemorrhage Father    • No Known Problems Daughter    • No Known Problems Maternal Grandmother    • No Known Problems Maternal Grandfather    • No Known Problems Paternal Grandmother    • No Known Problems Paternal Grandfather    • No Known Problems Paternal Aunt    • BRCA2 Positive Neg Hx    • BRCA2 Negative Neg Hx    • BRCA1 Positive Neg Hx    • BRCA1 Negative Neg Hx    • BRCA 1/2 Neg Hx    • Endometrial cancer Neg Hx    • Ovarian cancer Neg Hx    • Colon cancer Neg Hx    • Breast cancer additional onset Neg Hx    • Breast cancer Neg Hx       Social History:     Social History     Socioeconomic History   • Marital status: /Civil Union     Spouse name: None   • Number of children: None   • Years of education: None   • Highest education level: None   Occupational History   • None   Tobacco Use   • Smoking status: Never   • Smokeless tobacco: Never   Vaping Use   • Vaping Use: Never used   Substance and Sexual Activity   • Alcohol use: No   • Drug use: No   • Sexual activity: Yes     Partners: Male     Birth control/protection: Post-menopausal   Other Topics Concern   • None   Social History Narrative   • None     Social Determinants of Health     Financial Resource Strain: Low Risk  (7/5/2023)    Overall Financial Resource Strain (CARDIA)    • Difficulty of Paying Living Expenses: Not hard at all   Food Insecurity: Not on file   Transportation Needs: No Transportation Needs (7/5/2023)    PRAPARE - Transportation    • Lack of Transportation (Medical): No    • Lack of Transportation (Non-Medical):  No   Physical Activity: Not on file   Stress: Not on file   Social Connections: Not on file   Intimate Partner Violence: Not on file   Housing Stability: Not on file      Medications and Allergies:     Current Outpatient Medications   Medication Sig Dispense Refill   • b complex vitamins capsule Take 2 capsules by mouth daily     • busPIRone (BUSPAR) 30 MG tablet Take 1 tablet (30 mg total) by mouth 2 (two) times a day 180 tablet 0   • calcium citrate-vitamin D (CITRACAL+D) 315-200 MG-UNIT per tablet Take 2 tablets by mouth Twice daily OsteoMatrix     • Carboxymethylcell-Hypromellose (GenTeal) 0.25-0.3 % GEL Apply to eye daily at bedtime     • carboxymethylcellulose (REFRESH PLUS) 0.5 % SOLN Apply to eye     • Carboxymethylcellulose Sodium 1 % GEL Apply to eye     • Cholecalciferol (VITAMIN D3) 1000 units CAPS Take by mouth     • hydrOXYzine HCL (ATARAX) 10 mg tablet Take 1 tablet (10 mg total) by mouth every 6 (six) hours as needed for anxiety 90 tablet 0   • hyoscyamine (LEVBID) 0.375 mg 12 hr tablet Take 1 tablet (0.375 mg total) by mouth 2 (two) times a day 180 tablet 0   • loperamide (Anti-Diarrheal) 2 MG tablet TAKE ONE TABLET BY MOUTH IN THE MORNING AND ONE-HALF TABLET IN THE EVENING 144 tablet 0   • metoprolol succinate (TOPROL-XL) 50 mg 24 hr tablet Take 0.5 tablets by mouth 2 (two) times a day     • mometasone (NASONEX) 50 mcg/act nasal spray      • Multiple Vitamin (DAILY VALUE MULTIVITAMIN PO) Take 1 tablet by mouth in the morning     • Omega-3 Fatty Acids (OMEGA-3 FISH OIL) 1200 MG CAPS [place] OmegaGuard fish oil     • ondansetron (ZOFRAN) 4 mg tablet Take 1 tablet (4 mg total) by mouth every 8 (eight) hours as needed for nausea or vomiting 30 tablet 0   • sertraline (ZOLOFT) 100 mg tablet Take 2 tablets (200 mg total) by mouth daily 180 tablet 0   • trandolapril (Mavik) 2 MG tablet Take 1 tablet by mouth daily         No current facility-administered medications for this visit. Allergies   Allergen Reactions   • Tramadol Itching   • Carvedilol Fever   • Dust Mite Extract    • Influenza Virus Vaccine Visual Disturbance     Reaction Date: 28Feb2012;    • Molds & Smuts    • Juan Grass Pollen Allergen    • Tree Extract       Immunizations: There is no immunization history for the selected administration types on file for this patient. Health Maintenance:         Topic Date Due   • DXA SCAN  10/21/2022   • Colorectal Cancer Screening  08/07/2023   • Breast Cancer Screening: Mammogram  12/09/2023   • Hepatitis C Screening  Completed         Topic Date Due   • COVID-19 Vaccine (1) Never done   • Pneumococcal Vaccine: 65+ Years (1 - PCV) Never done      Medicare Screening Tests and Risk Assessments:         Health Risk Assessment:   Patient rates overall health as good. Patient feels that their physical health rating is same. Patient is satisfied with their life. Eyesight was rated as same. Hearing was rated as same. Patient feels that their emotional and mental health rating is much better.  Patients states they are never, rarely angry. Patient states they are sometimes unusually tired/fatigued. Pain experienced in the last 7 days has been some. Patient's pain rating has been 3/10. Patient states that she has experienced no weight loss or gain in last 6 months. Depression Screening:   PHQ-2 Score: 0      Fall Risk Screening: In the past year, patient has experienced: no history of falling in past year      Urinary Incontinence Screening:   Patient has not leaked urine accidently in the last six months. Home Safety:  Patient has trouble with stairs inside or outside of their home. Patient has working smoke alarms and has no working carbon monoxide detector. Home safety hazards include: none. Nutrition:   Current diet is Regular. Medications:   Patient is currently taking over-the-counter supplements. OTC medications include: Vitamins. Patient is able to manage medications. Activities of Daily Living (ADLs)/Instrumental Activities of Daily Living (IADLs):   Walk and transfer into and out of bed and chair?: Yes  Dress and groom yourself?: Yes    Bathe or shower yourself?: Yes    Feed yourself?  Yes  Do your laundry/housekeeping?: Yes  Manage your money, pay your bills and track your expenses?: Yes  Make your own meals?: Yes    Do your own shopping?: Yes    Previous Hospitalizations:   Any hospitalizations or ED visits within the last 12 months?: No      Advance Care Planning:   Living will: No    Durable POA for healthcare: No    Advanced directive: No    Advanced directive counseling given: Yes    Five wishes given: No    Patient declined ACP directive: No    End of Life Decisions reviewed with patient: Yes    Provider agrees with end of life decisions: Yes      Cognitive Screening:   Provider or family/friend/caregiver concerned regarding cognition?: No    PREVENTIVE SCREENINGS      Cardiovascular Screening:    General: Screening Current      Diabetes Screening:     General: Screening Current      Colorectal Cancer Screening:     General: Screening Current      Breast Cancer Screening:     General: Screening Current      Cervical Cancer Screening:    General: Screening Not Indicated      Abdominal Aortic Aneurysm (AAA) Screening:        General: Screening Not Indicated      Lung Cancer Screening:     General: Screening Not Indicated      Hepatitis C Screening:    General: Screening Current    Screening, Brief Intervention, and Referral to Treatment (SBIRT)    Screening  Typical number of drinks in a day: 0  Typical number of drinks in a week: 0  Interpretation: Low risk drinking behavior. AUDIT-C Screenin) How often did you have a drink containing alcohol in the past year? never  2) How many drinks did you have on a typical day when you were drinking in the past year? 0  3) How often did you have 6 or more drinks on one occasion in the past year? never    AUDIT-C Score: 0  Interpretation: Score 0-2 (female): Negative screen for alcohol misuse    Single Item Drug Screening:  How often have you used an illegal drug (including marijuana) or a prescription medication for non-medical reasons in the past year? never    Single Item Drug Screen Score: 0  Interpretation: Negative screen for possible drug use disorder    No results found. Physical Exam:     /72   Pulse 71   Temp (!) 97.1 °F (36.2 °C)   Resp 18   Ht 5' 8" (1.727 m)   Wt 60.5 kg (133 lb 6.4 oz)   SpO2 97%   BMI 20.28 kg/m²     Physical Exam  Constitutional:       Appearance: She is well-developed. HENT:      Head: Normocephalic and atraumatic. Right Ear: External ear normal.      Left Ear: External ear normal.      Nose: Nose normal.   Eyes:      Conjunctiva/sclera: Conjunctivae normal.      Pupils: Pupils are equal, round, and reactive to light. Cardiovascular:      Rate and Rhythm: Normal rate and regular rhythm. Heart sounds: Normal heart sounds. No murmur heard. No friction rub.    Pulmonary: Effort: Pulmonary effort is normal. No respiratory distress. Breath sounds: Normal breath sounds. No wheezing or rales. Chest:      Chest wall: No tenderness. Abdominal:      General: Bowel sounds are normal.      Palpations: Abdomen is soft. Musculoskeletal:         General: Normal range of motion. Cervical back: Normal range of motion and neck supple. Skin:     General: Skin is warm and dry. Capillary Refill: Capillary refill takes 2 to 3 seconds. Neurological:      Mental Status: She is alert and oriented to person, place, and time. Psychiatric:         Behavior: Behavior normal.         Thought Content:  Thought content normal.         Judgment: Judgment normal.          Louvenia Brood, DO

## 2023-07-24 ENCOUNTER — OFFICE VISIT (OUTPATIENT)
Dept: PSYCHIATRY | Facility: CLINIC | Age: 68
End: 2023-07-24
Payer: MEDICARE

## 2023-07-24 VITALS — DIASTOLIC BLOOD PRESSURE: 76 MMHG | HEART RATE: 81 BPM | SYSTOLIC BLOOD PRESSURE: 131 MMHG

## 2023-07-24 DIAGNOSIS — F33.41 MDD (MAJOR DEPRESSIVE DISORDER), RECURRENT, IN PARTIAL REMISSION (HCC): Primary | ICD-10-CM

## 2023-07-24 DIAGNOSIS — F43.10 POSTTRAUMATIC STRESS DISORDER: ICD-10-CM

## 2023-07-24 PROCEDURE — 99214 OFFICE O/P EST MOD 30 MIN: CPT | Performed by: STUDENT IN AN ORGANIZED HEALTH CARE EDUCATION/TRAINING PROGRAM

## 2023-07-24 PROCEDURE — 90833 PSYTX W PT W E/M 30 MIN: CPT | Performed by: STUDENT IN AN ORGANIZED HEALTH CARE EDUCATION/TRAINING PROGRAM

## 2023-07-24 RX ORDER — BUSPIRONE HYDROCHLORIDE 30 MG/1
30 TABLET ORAL 2 TIMES DAILY
Qty: 180 TABLET | Refills: 0 | Status: SHIPPED | OUTPATIENT
Start: 2023-07-24 | End: 2023-10-22

## 2023-07-24 RX ORDER — SERTRALINE HYDROCHLORIDE 100 MG/1
200 TABLET, FILM COATED ORAL DAILY
Qty: 180 TABLET | Refills: 0 | Status: SHIPPED | OUTPATIENT
Start: 2023-07-24 | End: 2023-10-22

## 2023-07-24 RX ORDER — PROPRANOLOL HYDROCHLORIDE 10 MG/1
10 TABLET ORAL 2 TIMES DAILY PRN
Qty: 90 TABLET | Refills: 0 | Status: SHIPPED | OUTPATIENT
Start: 2023-07-24

## 2023-07-24 NOTE — BH TREATMENT PLAN
TREATMENT PLAN (Medication Management Only)        [address] [facility]    Name and Date of Birth:  Cristina Tompkins 70 y.o. 1955  Date of Treatment Plan: July 24, 2023  Diagnosis/Diagnoses:    1. MDD (major depressive disorder), recurrent, in partial remission ([address])    2. Posttraumatic stress disorder      Strengths/Personal Resources for Self-Care: supportive family, supportive friends, taking medications as prescribed, ability to adapt to life changes, ability to communicate needs, ability to communicate well, ability to listen, ability to reason, ability to understand psychiatric illness, average or above intelligence, family ties, financial means, financial security, general fund of knowledge, good physical health, good understanding of illness, independence, motivation for treatment, ability to negotiate basic needs, Jehovah's witness affiliation, being resoureceful, self-reliance, sense of humor, special hobby/interest, stable employment, strong pankaj, well educated, willingness to work on problems, work skills. Area/Areas of need (in own words): anxiety symptoms  1. Long Term Goal: improve anxiety. Target Date:6 months - 1/24/2024  Person/Persons responsible for completion of goal: Bryanna Deal  2. Short Term Objective (s) - How will we reach this goal?:   A. Provider new recommended medication/dosage changes and/or continue medication(s): continue current medications as prescribed Zoloft, Buspar, Propranolol. B. Spend more time with friends and family. C. Attend psychotherapy regularly.   Target Date:6 months - 1/24/2024  Person/Persons Responsible for Completion of Goal: Bryanna Deal  Progress Towards Goals: continuing treatment  Treatment Modality: medication management with psychotherapy every 3 month, referral for individual psychotherapy  Review due 180 days from date of this plan: 6 months - 1/24/2024  Expected length of service: maintenance  My Physician and I have developed this plan together and I agree to work on the goals and objectives. I understand the treatment goals that were developed for my treatment.

## 2023-07-24 NOTE — BH CRISIS PLAN
Client Name: Brenda Meek       Client YOB: 1955  : 1955     Treatment Team (include name and contact information):     Psychotherapist: None    Psychiatrist: Alayna Billy   Release of information completed: yes      Healthcare Provider  Edgar Wang DO  [address] [facility] [address]  [phone]    Type of Plan   * Child plans (children 15 yo and younger) must be completed and signed by the child's legal guardian   * Plans for all individuals 15 yo and above must be signed by the client. Plan Type: adolescent/adult (15 and over) Initial      My Personal Strengths are (in the client's own words):  "Organizational skills"  The stressors and triggers that may put me at risk are:  anniversary of brother death, chronic anxiety, chronic mental illness and chronic pain    Coping skills I can use to keep myself calm and safe:  Listen to music and Other (describe) coloring     Coping skills/supports I can use to maintain abstinence from substance use:   keeping myself busy at home, keeping myself busy at work and family support    The people that provide me with help and support: (Include name, contact, and how they can help)   Support person #1:      * Phone number: in cell phone    * How can they help me? Talk her out    Support person #2:Daughter    * Phone number: in cell phone    * How can they help me?  Same        In the past, the following has helped me in times of crisis:    Calling a family member      If it is an emergency and you need immediate help, call     If there is a possibility of danger to yourself or others, call the following crisis hotline resources:     Adult Crisis Numbers  Suicide Prevention Hotline - Dial   [facility]: [address]: [address]: [address] [facility] [address]: [address]: [address]: [address]: [address]: [phone] (daytime). [phone] (after hours, weekends, holidays)     Child/Adolescent Crisis Numbers   [facility] \[facility]\"": [address]: [phone]   Avinash [phone]   [address]: [phone]    Please note: Some [facility] do not have a separate number for Child/Adolescent specific crisis. If your county is not listed under Child/Adolescent, please call the adult number for your county     National Talk to Text Line   All Ages - [phone]    In the event your feelings become unmanageable, and you cannot reach your support system, you will call 911 immediately or go to the nearest hospital emergency room.

## 2023-07-24 NOTE — PSYCH
MEDICATION MANAGEMENT NOTE        [facility]      Name and Date of Birth:  Rockford Osgood 70 y.o. 1955 MRN: [id number]    Date of Visit: July 24, 2023    Reason for Visit:   No chief complaint on file. SUBJECTIVE:    Sergio Layton is seen today for a follow up for Major Depressive Disorder and anxiety. She continues to do fairly well since the last visit. She reports doing very well, reports that anxiety symptoms and depressive symptoms has been fairly stable. She reports experience on and off anxiety symptoms. Patient is currently on Zoloft 200 mg and BuSpar 30 mg twice a day for anxiety and depression. She is also on hydroxyzine 10 mg as needed for anxiety however medication makes her drowsy and she only takes half or one quarter of the pill needed. her depression and anxiety improved significantly since maximizing Zoloft and Buspar doses  She reports mild and brief anxiety episodes. She reports worsening anxiety in the last week due to her daughter being ill and her son-in-law losing his job. and anxiety were manifest with physical symptoms in the morning including tingling and numbness and Can stay all day long. Her  reported that her coping skills is coloring sheets and speaking with him    She denies any suicidal ideation, intent or plan at present; denies any homicidal ideation, intent or plan at present. She denies any auditory hallucinations, denies any visual hallucinations, denies any overt delusions. She denies any side effects from psychiatric medications.     HPI ROS Appetite Changes and Sleep:     She reports normal sleep, normal appetite, normal energy level      Review Of Systems:      Constitutional negative   ENT negative   Cardiovascular negative   Respiratory negative   Gastrointestinal negative   Genitourinary negative   Musculoskeletal back pain   Integumentary negative   Neurological negative   Endocrine negative   Other Symptoms none, all other systems are negative         Past Medical History:    Past Medical History:   Diagnosis Date   • Arthritis    • Back pain    • Bipolar disorder ([address])    • Depression    • GERD (gastroesophageal reflux disease)    • Hypertension     last assessed: 06/29/12   • Sarcoidosis    • 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        Past Surgical History:   Procedure Laterality Date   • BLADDER SURGERY     • CATARACT EXTRACTION, BILATERAL     • COLPORRHAPHY      Anterior, repair of cystocele   • HYSTERECTOMY  1993    at age 43   • OOPHORECTOMY Bilateral 1993    at age 43   • RETINAL DETACHMENT SURGERY Left      Allergies   Allergen Reactions   • Tramadol Itching   • Carvedilol Fever   • Dust Mite Extract    • Influenza Virus Vaccine Visual Disturbance     Reaction Date: 28Feb2012;    • Molds & Smuts    • Juan Grass Pollen Allergen    • Tree Extract        Substance Abuse History:    Social History     Substance and Sexual Activity   Alcohol Use No     Social History     Substance and Sexual Activity   Drug Use No       Social History:    Social History     Socioeconomic History   • Marital status: /Civil Union     Spouse name: Not on file   • Number of children: Not on file   • Years of education: Not on file   • Highest education level: Not on file   Occupational History   • Not on file   Tobacco Use   • Smoking status: Never   • Smokeless tobacco: Never   Vaping Use   • Vaping Use: Never used   Substance and Sexual Activity   • Alcohol use: No   • Drug use: No   • Sexual activity: Yes     Partners: Male     Birth control/protection: Post-menopausal   Other Topics Concern   • Not on file   Social History Narrative   • Not on file     Social Determinants of Health     Financial Resource Strain: Low Risk  (7/5/2023)    Overall Financial Resource Strain (CARDIA)    • Difficulty of Paying Living Expenses: Not hard at all   Food Insecurity: Not on file   Transportation Needs: No Transportation Needs (7/5/2023)    PRAPARE - Transportation    • Lack of Transportation (Medical): No    • Lack of Transportation (Non-Medical): No   Physical Activity: Not on file   Stress: Not on file   Social Connections: Not on file   Intimate Partner Violence: Not on file   Housing Stability: Not on file       Family Psychiatric History:     Family History   Problem Relation Age of Onset   • Aneurysm Mother         Abd aorta aneurysm repair 2 dock limbs w/ visc extension pros   • Hypertension Father    • Subarachnoid hemorrhage Father    • No Known Problems Daughter    • No Known Problems Maternal Grandmother    • No Known Problems Maternal Grandfather    • No Known Problems Paternal Grandmother    • No Known Problems Paternal Grandfather    • No Known Problems Paternal Aunt    • BRCA2 Positive Neg Hx    • BRCA2 Negative Neg Hx    • BRCA1 Positive Neg Hx    • BRCA1 Negative Neg Hx    • BRCA 1/2 Neg Hx    • Endometrial cancer Neg Hx    • Ovarian cancer Neg Hx    • Colon cancer Neg Hx    • Breast cancer additional onset Neg Hx    • Breast cancer Neg Hx        History Review:  The following portions of the patient's history were reviewed and updated as appropriate: allergies, current medications, past family history, past medical history, past social history, past surgical history and problem list.         OBJECTIVE:     Vital signs in last 24 hours:    Vitals:    07/24/23 1038   BP: 131/76   BP Location: Right arm   Patient Position: Sitting   Cuff Size: Standard   Pulse: 81       Mental Status Evaluation:    Appearance age appropriate, casually dressed   Behavior cooperative, calm   Speech normal rate, normal volume, normal pitch   Mood improved   Affect normal range and intensity, appropriate   Thought Processes organized, goal directed   Associations intact associations   Thought Content no overt delusions   Perceptual Disturbances: no auditory hallucinations, no visual hallucinations   Abnormal Thoughts  Risk Potential Suicidal ideation - None  Homicidal ideation - None  Potential for aggression - No   Orientation oriented to person, place, time/date and situation   Memory recent and remote memory grossly intact   Consciousness alert and awake   Attention Span Concentration Span attention span and concentration are age appropriate   Intellect appears to be of average intelligence   Insight intact   Judgement intact   Muscle Strength and  Gait normal muscle strength and normal muscle tone   Motor activity no abnormal movements   Language no difficulty naming common objects, no difficulty repeating a phrase, no difficulty writing a sentence   Fund of Knowledge adequate knowledge of current events  adequate fund of knowledge regarding past history  adequate fund of knowledge regarding vocabulary    Pain moderate   Pain Scale 5       Laboratory Results: I have personally reviewed all pertinent laboratory/tests results    Most Recent Labs:   Lab Results   Component Value Date    WBC 4.54 03/18/2023    RBC 4.07 03/18/2023    HGB 13.5 03/18/2023    HCT 40.8 03/18/2023     03/18/2023    RDW 12.3 03/18/2023    NEUTROABS 2.83 03/18/2023     12/15/2015    K 4.1 03/18/2023     03/18/2023    CO2 28 03/18/2023    BUN 28 (H) 03/18/2023    CREATININE 0.88 03/18/2023    GLUCOSE 96 12/15/2015    CALCIUM 9.0 03/18/2023    AST 23 03/18/2023    ALT 21 03/18/2023    ALKPHOS 49 03/18/2023    PROT 6.5 12/15/2015    BILITOT 0.30 12/15/2015    CHOLESTEROL 191 07/16/2022    TRIG 112 07/16/2022    HDL 90 07/16/2022    [facility] 79 07/16/2022    [address] 101 07/16/2022    [id number] 3.898 03/18/2023       Suicide/Homicide Risk Assessment:    Risk of Harm to Self:  The following ratings are based on assessment at the time of the interview and review of records  Based on today's assessment, Michelle Myrick presents the following risk of harm to self: none    Risk of Harm to Others:   The following ratings are based on assessment at the time of the interview  Based on today's assessment, Michelle Myrick presents the following risk of harm to others: minimal    The following interventions are recommended: no intervention changes needed, not applicable    Assessment/Plan:  Michelle Myrick is a 75years old adult patient was seen today for follow-up her anxiety and depressive symptoms. With successfully tapered off her lorazepam because she was getting dependent on it and not recommended for her age. We were able to increase Zoloft and BuSpar to help with her anxiety. She is responding well to the medication changes and has residual anxiety and depression that are very mild in severity. The blood work is within normal limits including her vitamin D level. We will continue Zoloft and BuSpar at maximum doses to continue in remission. We will switch hydroxyzine to Inderal to help with physical symptoms of anxiety. I sent referral for individual psychotherapy      Diagnoses and all orders for this visit:    MDD (major depressive disorder), recurrent, in partial remission ([address])    Posttraumatic stress disorder  -     sertraline (ZOLOFT) 100 mg tablet; Take 2 tablets (200 mg total) by mouth daily  -     busPIRone (BUSPAR) 30 MG tablet; Take 1 tablet (30 mg total) by mouth 2 (two) times a day  -     propranolol (INDERAL) 10 mg tablet; Take 1 tablet (10 mg total) by mouth 2 (two) times a day as needed (anxiety) for up to 90 doses          Treatment Recommendations/Precautions:    Continue Zoloft 200 mg daily to improve depressive symptoms  Continue Buspar 30 mg 2 times a day to improve anxiety symptoms  Replace hydroxyzine with Inderal 10 mg as needed for anxiety attacks  Medication changes: I am having Zulema Stanton maintain her calcium citrate-vitamin D, Multiple Vitamin (DAILY VALUE MULTIVITAMIN PO), Mavik, metoprolol succinate, Vitamin D3, carboxymethylcellulose, Omega-3 Fish Oil, mometasone, b complex vitamins, GenTeal, Carboxymethylcellulose Sodium, busPIRone, sertraline, hydrOXYzine HCL, loperamide, ondansetron, and hyoscyamine.     Current Outpatient Medications:   •  b complex vitamins capsule, Take 2 capsules by mouth daily, Disp: , Rfl:   •  busPIRone (BUSPAR) 30 MG tablet, Take 1 tablet (30 mg total) by mouth 2 (two) times a day, Disp: 180 tablet, Rfl: 0  •  calcium citrate-vitamin D (CITRACAL+D) 315-200 MG-UNIT per tablet, Take 2 tablets by mouth Twice daily OsteoMatrix, Disp: , Rfl:   •  Carboxymethylcell-Hypromellose (GenTeal) 0.25-0.3 % GEL, Apply to eye daily at bedtime, Disp: , Rfl:   •  carboxymethylcellulose (REFRESH PLUS) 0.5 % SOLN, Apply to eye, Disp: , Rfl:   •  Carboxymethylcellulose Sodium 1 % GEL, Apply to eye, Disp: , Rfl:   •  Cholecalciferol (VITAMIN D3) 1000 units CAPS, Take by mouth, Disp: , Rfl:   •  hydrOXYzine HCL (ATARAX) 10 mg tablet, Take 1 tablet (10 mg total) by mouth every 6 (six) hours as needed for anxiety, Disp: 90 tablet, Rfl: 0  •  hyoscyamine (LEVBID) 0.375 mg 12 hr tablet, Take 1 tablet (0.375 mg total) by mouth 2 (two) times a day, Disp: 180 tablet, Rfl: 0  •  loperamide (Anti-Diarrheal) 2 MG tablet, TAKE ONE TABLET BY MOUTH IN THE MORNING AND ONE-HALF TABLET IN THE EVENING, Disp: 144 tablet, Rfl: 0  •  metoprolol succinate (TOPROL-XL) 50 mg 24 hr tablet, Take 0.5 tablets by mouth 2 (two) times a day, Disp: , Rfl:   •  mometasone (NASONEX) 50 mcg/act nasal spray, , Disp: , Rfl:   •  Multiple Vitamin (DAILY VALUE MULTIVITAMIN PO), Take 1 tablet by mouth in the morning, Disp: , Rfl:   •  Omega-3 Fatty Acids (OMEGA-3 FISH OIL) 1200 MG CAPS, [employer] OmegaGuard fish oil, Disp: , Rfl:   •  ondansetron (ZOFRAN) 4 mg tablet, Take 1 tablet (4 mg total) by mouth every 8 (eight) hours as needed for nausea or vomiting, Disp: 30 tablet, Rfl: 0  •  sertraline (ZOLOFT) 100 mg tablet, Take 2 tablets (200 mg total) by mouth daily, Disp: 180 tablet, Rfl: 0  •  trandolapril (Mavik) 2 MG tablet, Take 1 tablet by mouth daily  , Disp: , Rfl: Gurwinder Clark has a current medication list which includes the following prescription(s): b complex vitamins, buspirone, calcium citrate-vitamin d, genteal, carboxymethylcellulose, carboxymethylcellulose sodium, vitamin d3, hydroxyzine hcl, hyoscyamine, loperamide, metoprolol succinate, mometasone, multiple vitamin, omega-3 fish oil, ondansetron, sertraline, and mavik. Medication management with psychotherapy every 2 months  Aware of 24 hour and weekend coverage for urgent situations accessed by calling [facility] main practice number    Medications Risks/Benefits      Risks, Benefits And Possible Side Effects Of Medications:    Risks, benefits, and possible side effects of medications explained to Gurwinder Clark and she verbalizes understanding and agreement for treatment. Controlled Medication Discussion:     Gurwinder Clark has been filling controlled prescriptions on time as prescribed according to [address] [place] [address] Monitoring Program    Psychotherapy Provided:     Individual psychotherapy provided: Yes  Counseling was provided during the session today for 16 minutes. Medications, treatment progress and treatment plan reviewed with Gurwinder Clark. Medication changes discussed with Gurwinder Clark. Medication education provided to Gurwinder Clark. Importance of medication and treatment compliance reviewed with Gurwinder Clark. Educated on importance of medication and treatment compliance. Supportive therapy provided. Cognitive therapy was utilized during the session. Reassurance and supportive therapy provided. Reoriented to reality and reassured. Treatment Plan:    Completed and signed during the session: Not applicable - Treatment Plan not due at this session    Note Share:     This note was not shared with the patient due to reasonable likelihood of causing patient harm    Visit start and stop times:    Start Time: 10:35 AM  Stop Time: 10:55 AM    I spent 20 minutes directly with the patient during this visit    Sunitha Hein MD 07/24/23

## 2023-08-19 ENCOUNTER — APPOINTMENT (OUTPATIENT)
Dept: LAB | Facility: MEDICAL CENTER | Age: 68
End: 2023-08-19
Payer: MEDICARE

## 2023-08-19 LAB
CHOLEST SERPL-MCNC: 173 MG/DL
HDLC SERPL-MCNC: 84 MG/DL
LDLC SERPL CALC-MCNC: 67 MG/DL (ref 0–100)
NONHDLC SERPL-MCNC: 89 MG/DL
TRIGL SERPL-MCNC: 108 MG/DL

## 2023-09-07 ENCOUNTER — TELEPHONE (OUTPATIENT)
Dept: PSYCHIATRY | Facility: CLINIC | Age: 68
End: 2023-09-07

## 2023-09-07 NOTE — TELEPHONE ENCOUNTER
Contacted patient off of [facility]  to verify needs of services in attempts to offer patient an appointment at [facility] .  spoke with patient whom stated they would like "to let it go for now [they] are doing fine now" writer notified patient if anything changes to speak with psychiatrist.

## 2023-10-03 DIAGNOSIS — K58.0 IRRITABLE BOWEL SYNDROME WITH DIARRHEA: ICD-10-CM

## 2023-10-03 RX ORDER — HYOSCYAMINE SULFATE EXTENDED-RELEASE 0.38 MG/1
0.38 TABLET ORAL 2 TIMES DAILY
Qty: 180 TABLET | Refills: 0 | Status: SHIPPED | OUTPATIENT
Start: 2023-10-03

## 2023-10-24 ENCOUNTER — OFFICE VISIT (OUTPATIENT)
Dept: PSYCHIATRY | Facility: CLINIC | Age: 68
End: 2023-10-24
Payer: MEDICARE

## 2023-10-24 VITALS — DIASTOLIC BLOOD PRESSURE: 79 MMHG | HEART RATE: 72 BPM | SYSTOLIC BLOOD PRESSURE: 145 MMHG

## 2023-10-24 DIAGNOSIS — F43.10 POSTTRAUMATIC STRESS DISORDER: ICD-10-CM

## 2023-10-24 PROBLEM — I42.0: Status: ACTIVE | Noted: 2020-09-30

## 2023-10-24 PROBLEM — M19.90 OSTEOARTHRITIS: Status: RESOLVED | Noted: 2017-11-30 | Resolved: 2023-10-24

## 2023-10-24 PROBLEM — M54.16 CHRONIC LUMBAR RADICULOPATHY: Status: ACTIVE | Noted: 2017-03-24

## 2023-10-24 PROBLEM — I35.1 AORTIC VALVE REGURGITATION: Status: ACTIVE | Noted: 2020-09-30

## 2023-10-24 PROBLEM — Z90.710 H/O: HYSTERECTOMY: Status: ACTIVE | Noted: 2023-10-24

## 2023-10-24 PROCEDURE — 99214 OFFICE O/P EST MOD 30 MIN: CPT | Performed by: STUDENT IN AN ORGANIZED HEALTH CARE EDUCATION/TRAINING PROGRAM

## 2023-10-24 PROCEDURE — 90833 PSYTX W PT W E/M 30 MIN: CPT | Performed by: STUDENT IN AN ORGANIZED HEALTH CARE EDUCATION/TRAINING PROGRAM

## 2023-10-24 RX ORDER — PREDNISOLONE ACETATE 10 MG/ML
1 SUSPENSION/ DROPS OPHTHALMIC 3 TIMES DAILY
COMMUNITY
Start: 2023-08-24

## 2023-10-24 RX ORDER — SERTRALINE HYDROCHLORIDE 100 MG/1
200 TABLET, FILM COATED ORAL DAILY
Qty: 180 TABLET | Refills: 0 | Status: SHIPPED | OUTPATIENT
Start: 2023-10-24 | End: 2024-01-22

## 2023-10-24 RX ORDER — BUSPIRONE HYDROCHLORIDE 30 MG/1
30 TABLET ORAL 2 TIMES DAILY
Qty: 180 TABLET | Refills: 0 | Status: SHIPPED | OUTPATIENT
Start: 2023-10-24 | End: 2024-01-22

## 2023-10-24 NOTE — PSYCH
MEDICATION MANAGEMENT NOTE        [address] [facility] [place]      Name and Date of Birth:  Bonnye Boeck 70 y.o. 1955 MRN: [id number]    Date of Visit: October 24, 2023    Reason for Visit:   Chief Complaint   Patient presents with    Follow-up       SUBJECTIVE:    Jeremiah Fong is seen today for a follow up for Major Depressive Disorder and anxiety. . She reports done very well. Patient is currently on Zoloft 200 mg and BuSpar 30 mg twice a day for anxiety and depression. He is a previous visit we discontinued hydroxyzine due to side effects and replaced it with Inderal to help with anxiety attacks without causing sedation. She did not experience a lot of anxiety attacks she only used Inderal few times without having sedation . her depression and anxiety improved significantly since maximizing Zoloft and Buspar doses  She reports mild and brief anxiety episodes. She reports worsening anxiety in the last week due to one of her cousins showed up at her home without an invite and she has not see her for 25 years which made her anxious and uncomfortable. He did not deal with her very politely as she used to and she has guilt feelings about that. She is also recognizing that her depression make it back during wintertime. She denies any suicidal ideation, intent or plan at present; denies any homicidal ideation, intent or plan at present. She denies any auditory hallucinations, denies any visual hallucinations, denies any overt delusions. She denies any side effects from psychiatric medications.     HPI ROS Appetite Changes and Sleep:     She reports normal sleep, normal appetite, normal energy level      Review Of Systems:      Constitutional negative   ENT negative   Cardiovascular negative   Respiratory negative   Gastrointestinal negative   Genitourinary negative   Musculoskeletal back pain   Integumentary negative   Neurological negative   Endocrine negative   Other Symptoms none, all other systems are negative         Past Medical History:    Past Medical History:   Diagnosis Date    Arthritis     Back pain     Bipolar disorder ([address])     Depression     GERD (gastroesophageal reflux disease)     Hypertension     last assessed: 06/29/12    Sarcoidosis     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        Past Surgical History:   Procedure Laterality Date    BLADDER SURGERY      CATARACT EXTRACTION, BILATERAL      COLPORRHAPHY      Anterior, repair of cystocele    HYSTERECTOMY  1993    at age 43    OOPHORECTOMY Bilateral 46    at age 43    RETINAL DETACHMENT SURGERY Left      Allergies   Allergen Reactions    Tramadol Itching    Carvedilol Fever    Dust Mite Extract     Influenza Virus Vaccine Visual Disturbance     Reaction Date: 28Feb2012;     Molds & Smuts     Juan Grass Pollen Allergen     Tree Extract        Substance Abuse History:    Social History     Substance and Sexual Activity   Alcohol Use No     Social History     Substance and Sexual Activity   Drug Use No       Social History:    Social History     Socioeconomic History    Marital status: /Civil Union     Spouse name: Not on file    Number of children: Not on file    Years of education: Not on file    Highest education level: Not on file   Occupational History    Not on file   Tobacco Use    Smoking status: Never    Smokeless tobacco: Never   Vaping Use    Vaping Use: Never used   Substance and Sexual Activity    Alcohol use: No    Drug use: No    Sexual activity: Yes     Partners: Male     Birth control/protection: Post-menopausal   Other Topics Concern    Not on file   Social History Narrative    Not on file     Social Determinants of Health     Financial Resource Strain: Low Risk  (7/5/2023)    Overall Financial Resource Strain (CARDIA)     Difficulty of Paying Living Expenses: Not hard at all   Food Insecurity: Not on file   Transportation Needs: No Transportation Needs (7/5/2023)    PRAPARE - Transportation     Lack of Transportation (Medical): No     Lack of Transportation (Non-Medical): No   Physical Activity: Not on file   Stress: Not on file   Social Connections: Not on file   Intimate Partner Violence: Not on file   Housing Stability: Not on file       Family Psychiatric History:     Family History   Problem Relation Age of Onset    Aneurysm Mother         Abd aorta aneurysm repair 2 dock limbs w/ visc extension pros    Hypertension Father     Subarachnoid hemorrhage Father     No Known Problems Daughter     No Known Problems Maternal Grandmother     No Known Problems Maternal Grandfather     No Known Problems Paternal Grandmother     No Known Problems Paternal Grandfather     No Known Problems Paternal Aunt     BRCA2 Positive Neg Hx     BRCA2 Negative Neg Hx     BRCA1 Positive Neg Hx     BRCA1 Negative Neg Hx     BRCA 1/2 Neg Hx     Endometrial cancer Neg Hx     Ovarian cancer Neg Hx     Colon cancer Neg Hx     Breast cancer additional onset Neg Hx     Breast cancer Neg Hx        History Review:  The following portions of the patient's history were reviewed and updated as appropriate: allergies, current medications, past family history, past medical history, past social history, past surgical history and problem list.         OBJECTIVE:     Vital signs in last 24 hours:    Vitals:    10/24/23 1147   BP: 145/79   BP Location: Right arm   Patient Position: Sitting   Cuff Size: Standard   Pulse: 72         Mental Status Evaluation:    Appearance age appropriate, casually dressed   Behavior cooperative, calm   Speech normal rate, normal volume, normal pitch   Mood improved   Affect normal range and intensity, appropriate   Thought Processes organized, goal directed   Associations intact associations   Thought Content no overt delusions   Perceptual Disturbances: no auditory hallucinations, no visual hallucinations   Abnormal Thoughts  Risk Potential Suicidal ideation - None  Homicidal ideation - None  Potential for aggression - No   Orientation oriented to person, place, time/date and situation   Memory recent and remote memory grossly intact   Consciousness alert and awake   Attention Span Concentration Span attention span and concentration are age appropriate   Intellect appears to be of average intelligence   Insight intact   Judgement intact   Muscle Strength and  Gait normal muscle strength and normal muscle tone   Motor activity no abnormal movements   Language no difficulty naming common objects, no difficulty repeating a phrase, no difficulty writing a sentence   Fund of Knowledge adequate knowledge of current events  adequate fund of knowledge regarding past history  adequate fund of knowledge regarding vocabulary    Pain moderate   Pain Scale 5       Laboratory Results: I have personally reviewed all pertinent laboratory/tests results    Most Recent Labs:   Lab Results   Component Value Date    WBC 4.54 03/18/2023    RBC 4.07 03/18/2023    HGB 13.5 03/18/2023    HCT 40.8 03/18/2023     03/18/2023    RDW 12.3 03/18/2023    NEUTROABS 2.83 03/18/2023     12/15/2015    K 4.1 03/18/2023     03/18/2023    CO2 28 03/18/2023    BUN 28 (H) 03/18/2023    CREATININE 0.88 03/18/2023    GLUCOSE 96 12/15/2015    CALCIUM 9.0 03/18/2023    AST 23 03/18/2023    ALT 21 03/18/2023    ALKPHOS 49 03/18/2023    PROT 6.5 12/15/2015    BILITOT 0.30 12/15/2015    CHOLESTEROL 173 08/19/2023    TRIG 108 08/19/2023    HDL 84 08/19/2023    LDLCALC 67 08/19/2023    [address] 89 08/19/2023    [id number] 3.898 03/18/2023       Suicide/Homicide Risk Assessment:    Risk of Harm to Self:  The following ratings are based on assessment at the time of the interview and review of records  Based on today's assessment, Leanna Lane presents the following risk of harm to self: none    Risk of Harm to Others:   The following ratings are based on assessment at the time of the interview  Based on today's assessment, Azalia Ryan presents the following risk of harm to others: minimal    The following interventions are recommended: no intervention changes needed, not applicable    Assessment/Plan:  Azalia Ryan is a 70years old adult patient was seen today for follow-up her anxiety and depressive symptoms. With successfully tapered off her lorazepam because she was getting dependent on it and not recommended for her age. We were able to increase Zoloft and BuSpar to help with her anxiety. She is responding well to the medication changes and has residual anxiety and depression that are very mild in severity. The blood work is within normal limits including her vitamin D level. We will continue Zoloft and BuSpar at maximum doses to continue in remission. We will continue Inderal for anxiety attacks. Patient does not ready for psychotherapy yet and we discussed with patient that psychotherapy is not obligatory but she will also suffer from anxiety episodes when her PTSD is triggered. Diagnoses and all orders for this visit:    Posttraumatic stress disorder  -     busPIRone (BUSPAR) 30 MG tablet; Take 1 tablet (30 mg total) by mouth 2 (two) times a day  -     sertraline (ZOLOFT) 100 mg tablet; Take 2 tablets (200 mg total) by mouth daily    Other orders  -     prednisoLONE acetate (PRED FORTE) 1 % ophthalmic suspension; Administer 1 drop to both eyes 3 (three) times a day            Treatment Recommendations/Precautions:    Continue Zoloft 200 mg daily to improve depressive symptoms  Continue Buspar 30 mg 2 times a day to improve anxiety symptoms  Continue Inderal 10 mg as needed for anxiety attacks  Medication changes: I have discontinued Azalia Stanton's hydrOXYzine HCL.  I am also having her maintain her calcium citrate-vitamin D, Multiple Vitamin (DAILY VALUE MULTIVITAMIN PO), Mavik, metoprolol succinate, Vitamin D3, carboxymethylcellulose, Omega-3 Fish Oil, mometasone, b complex vitamins, GenTeal, Carboxymethylcellulose Sodium, loperamide, ondansetron, propranolol, hyoscyamine, prednisoLONE acetate, busPIRone, and sertraline.     Current Outpatient Medications:     b complex vitamins capsule, Take 2 capsules by mouth daily, Disp: , Rfl:     busPIRone (BUSPAR) 30 MG tablet, Take 1 tablet (30 mg total) by mouth 2 (two) times a day, Disp: 180 tablet, Rfl: 0    calcium citrate-vitamin D (CITRACAL+D) 315-200 MG-UNIT per tablet, Take 2 tablets by mouth Twice daily OsteoMatrix, Disp: , Rfl:     Cholecalciferol (VITAMIN D3) 1000 units CAPS, Take by mouth, Disp: , Rfl:     hyoscyamine (LEVBID) 0.375 mg 12 hr tablet, Take 1 tablet (0.375 mg total) by mouth 2 (two) times a day, Disp: 180 tablet, Rfl: 0    loperamide (Anti-Diarrheal) 2 MG tablet, TAKE ONE TABLET BY MOUTH IN THE MORNING AND ONE-HALF TABLET IN THE EVENING, Disp: 144 tablet, Rfl: 0    metoprolol succinate (TOPROL-XL) 50 mg 24 hr tablet, Take 0.5 tablets by mouth 2 (two) times a day, Disp: , Rfl:     mometasone (NASONEX) 50 mcg/act nasal spray, , Disp: , Rfl:     Multiple Vitamin (DAILY VALUE MULTIVITAMIN PO), Take 1 tablet by mouth in the morning, Disp: , Rfl:     Omega-3 Fatty Acids (OMEGA-3 FISH OIL) 1200 MG CAPS, [place] OmegaGuard fish oil, Disp: , Rfl:     ondansetron (ZOFRAN) 4 mg tablet, Take 1 tablet (4 mg total) by mouth every 8 (eight) hours as needed for nausea or vomiting, Disp: 30 tablet, Rfl: 0    prednisoLONE acetate (PRED FORTE) 1 % ophthalmic suspension, Administer 1 drop to both eyes 3 (three) times a day, Disp: , Rfl:     propranolol (INDERAL) 10 mg tablet, Take 1 tablet (10 mg total) by mouth 2 (two) times a day as needed (anxiety) for up to 90 doses, Disp: 90 tablet, Rfl: 0    sertraline (ZOLOFT) 100 mg tablet, Take 2 tablets (200 mg total) by mouth daily, Disp: 180 tablet, Rfl: 0    trandolapril (Mavik) 2 MG tablet, Take 1 tablet by mouth daily  , Disp: , Rfl:     Carboxymethylcell-Hypromellose (GenTeal) 0.25-0.3 % GEL, Apply to eye daily at bedtime, Disp: , Rfl:     carboxymethylcellulose (REFRESH PLUS) 0.5 % SOLN, Apply to eye, Disp: , Rfl:     Carboxymethylcellulose Sodium 1 % GEL, Apply to eye, Disp: , Rfl:   Cy Molina has a current medication list which includes the following prescription(s): b complex vitamins, buspirone, calcium citrate-vitamin d, genteal, carboxymethylcellulose, carboxymethylcellulose sodium, vitamin d3, hydroxyzine hcl, hyoscyamine, loperamide, metoprolol succinate, mometasone, multiple vitamin, omega-3 fish oil, ondansetron, propranolol, sertraline, and mavik. Medication management with psychotherapy every 2 months  Aware of 24 hour and weekend coverage for urgent situations accessed by calling [facility] main practice number    Medications Risks/Benefits      Risks, Benefits And Possible Side Effects Of Medications:    Risks, benefits, and possible side effects of medications explained to Cy Molina and she verbalizes understanding and agreement for treatment. Controlled Medication Discussion:     Cy Molina has been filling controlled prescriptions on time as prescribed according to [address] [facility] Monitoring Program    Psychotherapy Provided:     Individual psychotherapy provided: Yes  Counseling was provided during the session today for 16 minutes. Medications, treatment progress and treatment plan reviewed with Cy Molina. Medication changes discussed with Cy Molina. Medication education provided to Cy Molina. Importance of medication and treatment compliance reviewed with Cy Molina. Educated on importance of medication and treatment compliance. Supportive therapy provided. Cognitive therapy was utilized during the session. Reassurance and supportive therapy provided. Reoriented to reality and reassured. Treatment Plan:    Completed and signed during the session: Not applicable - Treatment Plan not due at this session    Note Share:     This note was not shared with the patient due to reasonable likelihood of causing patient harm    Visit start and stop times:    Start Time:  11:30 AM  Stop Time:  12:00 PM    I spent 30 minutes directly with the patient during this visit    Cristy Rodriguez MD 10/24/23

## 2023-11-13 DIAGNOSIS — Z12.31 ENCOUNTER FOR SCREENING MAMMOGRAM FOR BREAST CANCER: Primary | ICD-10-CM

## 2023-12-12 DIAGNOSIS — K58.0 IRRITABLE BOWEL SYNDROME WITH DIARRHEA: ICD-10-CM

## 2023-12-12 RX ORDER — HYOSCYAMINE SULFATE EXTENDED-RELEASE 0.38 MG/1
TABLET ORAL
Qty: 180 TABLET | Refills: 0 | Status: SHIPPED | OUTPATIENT
Start: 2023-12-12

## 2023-12-18 ENCOUNTER — HOSPITAL ENCOUNTER (OUTPATIENT)
Dept: MAMMOGRAPHY | Facility: HOSPITAL | Age: 68
Discharge: HOME | End: 2023-12-18
Attending: FAMILY MEDICINE
Payer: MEDICARE

## 2023-12-18 DIAGNOSIS — Z12.31 ENCOUNTER FOR SCREENING MAMMOGRAM FOR BREAST CANCER: ICD-10-CM

## 2023-12-18 PROCEDURE — 77067 SCR MAMMO BI INCL CAD: CPT

## 2023-12-18 PROCEDURE — 77063 BREAST TOMOSYNTHESIS BI: CPT

## 2023-12-19 DIAGNOSIS — F43.10 POSTTRAUMATIC STRESS DISORDER: ICD-10-CM

## 2023-12-19 RX ORDER — PROPRANOLOL HYDROCHLORIDE 10 MG/1
10 TABLET ORAL 2 TIMES DAILY PRN
Qty: 90 TABLET | Refills: 0 | Status: SHIPPED | OUTPATIENT
Start: 2023-12-19

## 2024-01-10 DIAGNOSIS — K58.0 IRRITABLE BOWEL SYNDROME WITH DIARRHEA: ICD-10-CM

## 2024-01-10 RX ORDER — LOPERAMIDE HYDROCHLORIDE 2 MG/1
TABLET ORAL
Qty: 144 TABLET | Refills: 0 | Status: SHIPPED | OUTPATIENT
Start: 2024-01-10

## 2024-01-24 ENCOUNTER — OFFICE VISIT (OUTPATIENT)
Dept: PSYCHIATRY | Facility: CLINIC | Age: 69
End: 2024-01-24
Payer: MEDICARE

## 2024-01-24 VITALS — SYSTOLIC BLOOD PRESSURE: 114 MMHG | HEART RATE: 72 BPM | DIASTOLIC BLOOD PRESSURE: 74 MMHG

## 2024-01-24 DIAGNOSIS — F33.41 MDD (MAJOR DEPRESSIVE DISORDER), RECURRENT, IN PARTIAL REMISSION (HCC): Primary | ICD-10-CM

## 2024-01-24 DIAGNOSIS — F43.10 POSTTRAUMATIC STRESS DISORDER: ICD-10-CM

## 2024-01-24 PROBLEM — M85.80 OSTEOPENIA: Status: RESOLVED | Noted: 2024-01-24 | Resolved: 2024-01-24

## 2024-01-24 PROCEDURE — 90833 PSYTX W PT W E/M 30 MIN: CPT | Performed by: STUDENT IN AN ORGANIZED HEALTH CARE EDUCATION/TRAINING PROGRAM

## 2024-01-24 PROCEDURE — 99214 OFFICE O/P EST MOD 30 MIN: CPT | Performed by: STUDENT IN AN ORGANIZED HEALTH CARE EDUCATION/TRAINING PROGRAM

## 2024-01-24 RX ORDER — AVOBENZONE, HOMOSALATE, OCTISALATE, OCTOCRYLENE, OXYBENZONE 30; 130; 50; 70; 40 MG/ML; MG/ML; MG/ML; MG/ML; MG/ML
LOTION TOPICAL
COMMUNITY

## 2024-01-24 RX ORDER — BUSPIRONE HYDROCHLORIDE 30 MG/1
30 TABLET ORAL 2 TIMES DAILY
Qty: 360 TABLET | Refills: 0 | Status: SHIPPED | OUTPATIENT
Start: 2024-01-24 | End: 2024-07-22

## 2024-01-24 RX ORDER — PHENOL 1.4 %
AEROSOL, SPRAY (ML) MUCOUS MEMBRANE
COMMUNITY

## 2024-01-24 RX ORDER — SERTRALINE HYDROCHLORIDE 100 MG/1
200 TABLET, FILM COATED ORAL DAILY
Qty: 360 TABLET | Refills: 0 | Status: SHIPPED | OUTPATIENT
Start: 2024-01-24 | End: 2024-07-22

## 2024-01-24 NOTE — BH TREATMENT PLAN
TREATMENT PLAN (Medication Management Only)        [facility]    Name and Date of Birth:  uZlema Stanton 68 y.o. 1955  Date of Treatment Plan: January 24, 2024  Diagnosis/Diagnoses:    1. MDD (major depressive disorder), recurrent, in partial remission (HCC)    2. Posttraumatic stress disorder      Strengths/Personal Resources for Self-Care: supportive family, supportive friends, taking medications as prescribed, ability to adapt to life changes, ability to communicate needs, ability to communicate well, ability to listen, ability to reason, ability to understand psychiatric illness, average or above intelligence, family ties, financial means, financial security, general fund of knowledge, good physical health, good understanding of illness, independence, motivation for treatment, ability to negotiate basic needs, [employer] affiliation, being resoureceful, self-reliance, sense of humor, special hobby/interest, stable employment, strong pankaj, well educated, willingness to work on problems, work skills.  Area/Areas of need (in own words): anxiety symptoms  1. Long Term Goal: improve anxiety.  Target Date:6 months - 7/24/2024  Person/Persons responsible for completion of goal: Zulema  2.  Short Term Objective (s) - How will we reach this goal?:   A. Provider new recommended medication/dosage changes and/or continue medication(s): continue current medications as prescribed Zoloft, Buspar, Propranolol.  B. Spend more time with friends and family.  C. Attend psychotherapy regularly.  Target Date:6 months - 7/24/2024  Person/Persons Responsible for Completion of Goal: Zulema  Progress Towards Goals: continuing treatment  Treatment Modality: medication management with psychotherapy every 6 month, referral for individual psychotherapy  Review due 180 days from date of this plan: 6 months - 7/24/2024  Expected length of service: maintenance  My Physician and I have  developed this plan together and I agree to work on the goals and objectives. I understand the treatment goals that were developed for my treatment.

## 2024-01-24 NOTE — PSYCH
MEDICATION MANAGEMENT NOTE        [facility] - [facility]      Name and Date of Birth:  Zulema Stanton 68 y.o. 1955 MRN: [id number]    Date of Visit: January 24, 2024    Reason for Visit:   Chief Complaint   Patient presents with    Follow-up       SUBJECTIVE:    Zulema is seen today for a follow up for Major Depressive Disorder and anxiety. . She reports done very well.  Patient is currently on Zoloft 200 mg and BuSpar 30 mg twice a day for anxiety and depression.  He is a previous visit we discontinued hydroxyzine due to side effects and replaced it with Inderal to help with anxiety attacks without causing sedation.  She did not experience a lot of anxiety attacks she only used Inderal few times without having sedation . her depression and anxiety improved significantly since maximizing Zoloft and Buspar doses  She reports mild and brief anxiety episodes.      She denies any suicidal ideation, intent or plan at present; denies any homicidal ideation, intent or plan at present.    She denies any auditory hallucinations, denies any visual hallucinations, denies any overt delusions.    She denies any side effects from psychiatric medications.    HPI ROS Appetite Changes and Sleep:     She reports normal sleep, normal appetite, normal energy level      Review Of Systems:      Constitutional negative   ENT negative   Cardiovascular negative   Respiratory negative   Gastrointestinal negative   Genitourinary negative   Musculoskeletal back pain   Integumentary negative   Neurological negative   Endocrine negative   Other Symptoms none, all other systems are negative         Past Medical History:    Past Medical History:   Diagnosis Date    Arthritis     Back pain     Bipolar disorder (HCC)     Carbuncle and furuncle of foot 02/17/2014    Depression     GERD (gastroesophageal reflux disease)     Hypertension     last assessed: 06/29/12    Osteoarthritis 11/30/2017     Procedure/Onset: 03/01/2017; Description: With Arthritis in right jaw    Osteomyelitis of toe (HCC) 06/25/2014    Osteopenia 01/24/2024    Formatting of this note might be different from the original.   LT TOTAL HIP -1.6 12/15    Sarcoidosis     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        Past Surgical History:   Procedure Laterality Date    BLADDER SURGERY      CATARACT EXTRACTION, BILATERAL      COLPORRHAPHY      Anterior, repair of cystocele    HYSTERECTOMY  1993    at age 38    OOPHORECTOMY Bilateral 1993    at age 38    RETINAL DETACHMENT SURGERY Left      Allergies   Allergen Reactions    Tramadol Itching    Carvedilol Fever and Other (See Comments)    Dust Mite Extract     Influenza Virus Vaccine Visual Disturbance     Reaction Date: 28Feb2012;     Molds & Smuts     Pollen Extract Eye Swelling     Trees, grass    Juan Grass Pollen Allergen     Tree Extract        Substance Abuse History:    Social History     Substance and Sexual Activity   Alcohol Use No     Social History     Substance and Sexual Activity   Drug Use No       Social History:    Social History     Socioeconomic History    Marital status: /Civil Union     Spouse name: Not on file    Number of children: Not on file    Years of education: Not on file    Highest education level: Not on file   Occupational History    Not on file   Tobacco Use    Smoking status: Never    Smokeless tobacco: Never   Vaping Use    Vaping status: Never Used   Substance and Sexual Activity    Alcohol use: No    Drug use: No    Sexual activity: Yes     Partners: Male     Birth control/protection: Post-menopausal   Other Topics Concern    Not on file   Social History Narrative    Not on file     Social Determinants of Health     Financial Resource Strain: Low Risk  (7/5/2023)    Overall Financial Resource Strain (CARDIA)     Difficulty of Paying Living Expenses: Not hard at all   Food Insecurity: Not on file   Transportation Needs:  No Transportation Needs (7/5/2023)    PRAPARE - Transportation     Lack of Transportation (Medical): No     Lack of Transportation (Non-Medical): No   Physical Activity: Not on file   Stress: Not on file   Social Connections: Not on file   Intimate Partner Violence: Not on file   Housing Stability: Not on file       Family Psychiatric History:     Family History   Problem Relation Age of Onset    Aneurysm Mother         Abd aorta aneurysm repair 2 dock limbs w/ visc extension pros    Hypertension Father     Subarachnoid hemorrhage Father     No Known Problems Daughter     No Known Problems Maternal Grandmother     No Known Problems Maternal Grandfather     No Known Problems Paternal Grandmother     No Known Problems Paternal Grandfather     No Known Problems Paternal Aunt     BRCA2 Positive Neg Hx     BRCA2 Negative Neg Hx     BRCA1 Positive Neg Hx     BRCA1 Negative Neg Hx     BRCA 1/2 Neg Hx     Endometrial cancer Neg Hx     Ovarian cancer Neg Hx     Colon cancer Neg Hx     Breast cancer additional onset Neg Hx     Breast cancer Neg Hx        History Review: The following portions of the patient's history were reviewed and updated as appropriate: allergies, current medications, past family history, past medical history, past social history, past surgical history and problem list.         OBJECTIVE:     Vital signs in last 24 hours:    Vitals:    01/24/24 1131   BP: 114/74   BP Location: Right arm   Patient Position: Sitting   Cuff Size: Standard   Pulse: 72         Mental Status Evaluation:    Appearance age appropriate, casually dressed   Behavior cooperative, calm   Speech normal rate, normal volume, normal pitch   Mood improved   Affect normal range and intensity, appropriate   Thought Processes organized, goal directed   Associations intact associations   Thought Content no overt delusions   Perceptual Disturbances: no auditory hallucinations, no visual hallucinations   Abnormal Thoughts  Risk Potential  Suicidal ideation - None  Homicidal ideation - None  Potential for aggression - No   Orientation oriented to person, place, time/date and situation   Memory recent and remote memory grossly intact   Consciousness alert and awake   Attention Span Concentration Span attention span and concentration are age appropriate   Intellect appears to be of average intelligence   Insight intact   Judgement intact   Muscle Strength and  Gait normal muscle strength and normal muscle tone   Motor activity no abnormal movements   Language no difficulty naming common objects, no difficulty repeating a phrase, no difficulty writing a sentence   Fund of Knowledge adequate knowledge of current events  adequate fund of knowledge regarding past history  adequate fund of knowledge regarding vocabulary    Pain moderate   Pain Scale 5       Laboratory Results: I have personally reviewed all pertinent laboratory/tests results    Most Recent Labs:   Lab Results   Component Value Date    WBC 4.54 03/18/2023    RBC 4.07 03/18/2023    HGB 13.5 03/18/2023    HCT 40.8 03/18/2023     03/18/2023    RDW 12.3 03/18/2023    NEUTROABS 2.83 03/18/2023     12/15/2015    K 4.1 03/18/2023     03/18/2023    CO2 28 03/18/2023    BUN 28 (H) 03/18/2023    CREATININE 0.88 03/18/2023    GLUCOSE 96 12/15/2015    CALCIUM 9.0 03/18/2023    AST 23 03/18/2023    ALT 21 03/18/2023    ALKPHOS 49 03/18/2023    PROT 6.5 12/15/2015    BILITOT 0.30 12/15/2015    CHOLESTEROL 173 08/19/2023    TRIG 108 08/19/2023    HDL 84 08/19/2023    LDLCALC 67 08/19/2023    NONHDLC 89 08/19/2023    ZKR6EOGAVGHT 3.898 03/18/2023       Suicide/Homicide Risk Assessment:    Risk of Harm to Self:  The following ratings are based on assessment at the time of the interview and review of records  Based on today's assessment, Zulema presents the following risk of harm to self: none    Risk of Harm to Others:  The following ratings are based on assessment at the time of the  interview  Based on today's assessment, Zulema presents the following risk of harm to others: minimal    The following interventions are recommended: no intervention changes needed, not applicable    Assessment/Plan:  Zulema is a 68 years old adult patient was seen today for follow-up her anxiety and depressive symptoms.  With successfully tapered off her lorazepam because she was getting dependent on it and not recommended for her age.  We were able to increase Zoloft and BuSpar to help with her anxiety.  She is responding well to the medication changes and has residual anxiety and depression that are very mild in severity.  The blood work is within normal limits including her vitamin D level.  We will continue Zoloft and BuSpar at maximum doses to continue in remission.  We will continue Inderal for anxiety attacks.  Patient does not ready for psychotherapy yet and we discussed with patient that psychotherapy is not obligatory but she will also suffer from anxiety episodes when her PTSD is triggered.      Diagnoses and all orders for this visit:    MDD (major depressive disorder), recurrent, in partial remission (HCC)    Other orders  -     Nutritional Supplements (Osteo Advance) TABS; Take by mouth  -     Multiple Vitamins-Minerals (Multivitamin Adults 50+) TABS; Take by mouth  -     B Complex-Biotin-FA (B Complex 100 TR) TBCR; Take by mouth            Treatment Recommendations/Precautions:    Continue Zoloft 200 mg daily to improve depressive symptoms  Continue Buspar 30 mg 2 times a day to improve anxiety symptoms  Continue Inderal 10 mg as needed for anxiety attacks  Medication changes: I am having Zulema Stanton maintain her calcium citrate-vitamin D, Multiple Vitamin (DAILY VALUE MULTIVITAMIN PO), Mavik, metoprolol succinate, Vitamin D3, carboxymethylcellulose, Omega-3 Fish Oil, mometasone, b complex vitamins, GenTeal, Carboxymethylcellulose Sodium, ondansetron, prednisoLONE acetate, busPIRone, sertraline,  hyoscyamine, propranolol, loperamide, Osteo Advance, Multivitamin Adults 50+, and B Complex 100 TR.    Current Outpatient Medications:     b complex vitamins capsule, Take 2 capsules by mouth daily, Disp: , Rfl:     B Complex-Biotin-FA (B Complex 100 TR) TBCR, Take by mouth, Disp: , Rfl:     calcium citrate-vitamin D (CITRACAL+D) 315-200 MG-UNIT per tablet, Take 2 tablets by mouth Twice daily OsteoMatrix, Disp: , Rfl:     Carboxymethylcell-Hypromellose (GenTeal) 0.25-0.3 % GEL, Apply to eye daily at bedtime, Disp: , Rfl:     carboxymethylcellulose (REFRESH PLUS) 0.5 % SOLN, Apply to eye, Disp: , Rfl:     Carboxymethylcellulose Sodium 1 % GEL, Apply to eye, Disp: , Rfl:     Cholecalciferol (VITAMIN D3) 1000 units CAPS, Take by mouth, Disp: , Rfl:     hyoscyamine (LEVBID) 0.375 mg 12 hr tablet, TAKE ONE (1) TABLET (0.375 MG TOTAL) BY MOUTH TWO (2) (TWO) TIMES A DAY, Disp: 180 tablet, Rfl: 0    loperamide (Anti-Diarrheal) 2 MG tablet, TAKE ONE TABLET BY MOUTH IN THE MORNING AND ONE-HALF TABLET IN THE EVENING, Disp: 144 tablet, Rfl: 0    metoprolol succinate (TOPROL-XL) 50 mg 24 hr tablet, Take 0.5 tablets by mouth 2 (two) times a day, Disp: , Rfl:     mometasone (NASONEX) 50 mcg/act nasal spray, , Disp: , Rfl:     Multiple Vitamin (DAILY VALUE MULTIVITAMIN PO), Take 1 tablet by mouth in the morning, Disp: , Rfl:     Multiple Vitamins-Minerals (Multivitamin Adults 50+) TABS, Take by mouth, Disp: , Rfl:     Nutritional Supplements (Osteo Advance) TABS, Take by mouth, Disp: , Rfl:     Omega-3 Fatty Acids (OMEGA-3 FISH OIL) 1200 MG CAPS, [place] OmegaGuard fish oil, Disp: , Rfl:     ondansetron (ZOFRAN) 4 mg tablet, Take 1 tablet (4 mg total) by mouth every 8 (eight) hours as needed for nausea or vomiting, Disp: 30 tablet, Rfl: 0    prednisoLONE acetate (PRED FORTE) 1 % ophthalmic suspension, Administer 1 drop to both eyes 3 (three) times a day, Disp: , Rfl:     propranolol (INDERAL) 10 mg tablet, Take 1 tablet (10 mg  total) by mouth 2 (two) times a day as needed (anxiety) for up to 90 doses, Disp: 90 tablet, Rfl: 0    trandolapril (Mavik) 2 MG tablet, Take 1 tablet by mouth daily  , Disp: , Rfl:     busPIRone (BUSPAR) 30 MG tablet, Take 1 tablet (30 mg total) by mouth 2 (two) times a day, Disp: 180 tablet, Rfl: 0    sertraline (ZOLOFT) 100 mg tablet, Take 2 tablets (200 mg total) by mouth daily, Disp: 180 tablet, Rfl: 0  Zulema has a current medication list which includes the following prescription(s): b complex vitamins, b complex 100 tr, calcium citrate-vitamin d, genteal, carboxymethylcellulose, carboxymethylcellulose sodium, vitamin d3, hyoscyamine, loperamide, metoprolol succinate, mometasone, multiple vitamin, multivitamin adults 50+, osteo advance, omega-3 fish oil, ondansetron, prednisolone acetate, propranolol, mavik, buspirone, and sertraline.  Medication management with psychotherapy every 2 months  Aware of 24 hour and weekend coverage for urgent situations accessed by calling [facility] main practice number    Medications Risks/Benefits      Risks, Benefits And Possible Side Effects Of Medications:    Risks, benefits, and possible side effects of medications explained to Zulema and she verbalizes understanding and agreement for treatment.    Controlled Medication Discussion:     Zulema has been filling controlled prescriptions on time as prescribed according to [place] Prescription Drug Monitoring Program    Psychotherapy Provided:     Individual psychotherapy provided: Yes  Counseling was provided during the session today for 16 minutes.  Medications, treatment progress and treatment plan reviewed with Zulema.  Medication changes discussed with Zulema.  Medication education provided to Zulema.  Importance of medication and treatment compliance reviewed with Zulema.  Educated on importance of medication and treatment compliance.  Supportive therapy provided.   Cognitive therapy was  utilized during the session.  Reassurance and supportive therapy provided.   Reoriented to reality and reassured.      Treatment Plan:    Completed and signed during the session: Not applicable - Treatment Plan not due at this session    Note Share:    This note was not shared with the patient due to reasonable likelihood of causing patient harm    Visit start and stop times:    Start Time:  11:20 AM  Stop Time:  11:40 AM    I spent 20 minutes directly with the patient during this visit    Kimmy Hurt MD 01/24/24

## 2024-01-24 NOTE — BH CRISIS PLAN
"Client Name: Zulema Stanton       Client YOB: 1955  : 1955     Treatment Team (include name and contact information):     Psychotherapist: None    Psychiatrist: Kimmy Hurt   Release of information completed: yes      Healthcare Provider  Kenneth Lala DO  [address] [facility] [address]  [phone]    Type of Plan   * Child plans (children 14 yo and younger) must be completed and signed by the child's legal guardian   * Plans for all individuals 15 yo and above must be signed by the client.     Plan Type: adolescent/adult (14 and over) Initial      My Personal Strengths are (in the client's own words):  \"Organizational skills\"  The stressors and triggers that may put me at risk are:  anniversary of brother death, chronic anxiety, chronic mental illness and chronic pain    Coping skills I can use to keep myself calm and safe:  Listen to music and Other (describe) coloring     Coping skills/supports I can use to maintain abstinence from substance use:   keeping myself busy at home, keeping myself busy at work and family support    The people that provide me with help and support: (Include name, contact, and how they can help)   Support person #1:      * Phone number: in cell phone    * How can they help me? Talk her out    Support person #2:Daughter    * Phone number: in cell phone    * How can they help me? Same        In the past, the following has helped me in times of crisis:    Calling a family member      If it is an emergency and you need immediate help, call     If there is a possibility of danger to yourself or others, call the following crisis hotline resources:     Adult Crisis Numbers  Suicide Prevention Hotline - Dial   [facility]: [phone]  [facility]: [phone]  [facility]: [phone]  [facility]: [phone]  [place]: [phone]  [facility]: [phone]  [facility]: " [phone]  [facility]: [phone] (daytime).       [phone] (after hours, weekends, holidays)     Child/Adolescent Crisis Numbers   [facility]: [phone]   [facility]: [phone]   [place], [place]: [phone]   [employer]: [phone]    Please note: Some [facility] do not have a separate number for Child/Adolescent specific crisis. If your county is not listed under Child/Adolescent, please call the adult number for your county     National Talk to Text Line   All Ages - [phone]    In the event your feelings become unmanageable, and you cannot reach your support system, you will call 911 immediately or go to the nearest hospital emergency room.

## 2024-04-07 DIAGNOSIS — K58.0 IRRITABLE BOWEL SYNDROME WITH DIARRHEA: ICD-10-CM

## 2024-04-08 RX ORDER — HYOSCYAMINE SULFATE 0.38 MG/1
TABLET, EXTENDED RELEASE ORAL
Qty: 180 TABLET | Refills: 0 | Status: SHIPPED | OUTPATIENT
Start: 2024-04-08

## 2024-05-23 DIAGNOSIS — F43.10 POSTTRAUMATIC STRESS DISORDER: ICD-10-CM

## 2024-05-23 RX ORDER — BUSPIRONE HYDROCHLORIDE 30 MG/1
TABLET ORAL
Qty: 360 TABLET | Refills: 0 | Status: SHIPPED | OUTPATIENT
Start: 2024-05-23

## 2024-05-23 RX ORDER — SERTRALINE HYDROCHLORIDE 100 MG/1
TABLET, FILM COATED ORAL
Qty: 360 TABLET | Refills: 0 | Status: SHIPPED | OUTPATIENT
Start: 2024-05-23

## 2024-06-17 NOTE — TELEPHONE ENCOUNTER
"INFECTIOUS DISEASE INPATIENT INITIAL CONSULTATION    Referred By: Jalen Pulido    Reason For Consult:  HS axillary flare, 5 weeks pregnant    HPI:  This is a 34 y.o. female with PMH of hydradenitis suppurativa, 5 weeks pregnant who presented with right axillary HS flare.    I have seen patient multiple times in the recent past. Was taking PO Doxy/Augmentin alternating although not on PO Doxy since 4/2024. Was on Cosentyx for HS but this was stopped now since she found out is pregnant as of last week. Pain/drainage from the right axilla. No fevers/chills. Surgery has evaluated - no plans for surgery at this time. She is worried about what she will do for pain and then also about reducing flares since Cosentyx needs to be held. Is on IV Vanc/Zosyn now.     Allergies:  Suboxone [buprenorphine-naloxone], Clarithromycin, Haloperidol, Keflex [cephalexin], Levofloxacin, Metoclopramide, and Reglan [metoclopramide hcl]     Vitals (Last 24 Hours):  Heart Rate:  [70-82]   Temp:  [35.8 °C (96.4 °F)-36.6 °C (97.9 °F)]   Resp:  [16-24]   BP: ()/()   Height:  [154.9 cm (5' 1\")]   Weight:  [78.5 kg (173 lb)]   SpO2:  [99 %-100 %]      PHYSICAL EXAM:  Gen - NAD  Heart - RRR, no murmurs  Lungs - clear bilaterally, no wheezing  Abd - soft, no ttp, BS present  Axilla - right axilla with nodular scarring and small areas of drainage  Skin - no rash    MEDS:    Current Facility-Administered Medications:     acetaminophen (Tylenol) tablet 650 mg, 650 mg, oral, q6h PRN, Laverne JOE Bingham APRN-CNP    gabapentin (Neurontin) capsule 800 mg, 800 mg, oral, TID, Laverne Bingham APRN-CNP, 800 mg at 06/17/24 1547    HYDROmorphone (Dilaudid) injection 1 mg, 1 mg, intravenous, q4h PRN, Jalen Pulido MD, 1 mg at 06/17/24 1345    ondansetron (Zofran) injection 4 mg, 4 mg, intravenous, q8h PRN, Laverne Bingham APRN-CNP    oxyCODONE (Roxicodone) immediate release tablet 10 mg, 10 mg, oral, q6h PRN, Jalen Pulido MD    " Script will need to be for Lorazepam 0 5mg tablets for 1 week    The patient will run out before seeing you again oxyCODONE (Roxicodone) immediate release tablet 15 mg, 15 mg, oral, q6h PRN, Jalen Pulido MD, 15 mg at 06/17/24 1548    pantoprazole (ProtoNix) EC tablet 40 mg, 40 mg, oral, Daily before breakfast, DEBBY Gupta, 40 mg at 06/17/24 0634    piperacillin-tazobactam-dextrose (Zosyn) IV 3.375 g, 3.375 g, intravenous, q6h, ZAFAR Gupta-CNP, Stopped at 06/17/24 1059    polyethylene glycol (Glycolax, Miralax) packet 17 g, 17 g, oral, Daily PRN, DEBBY Gupta    prenatal vitamin (iron-folic) tablet 1 tablet, 1 tablet, oral, Daily, DEBBY Gupta, 1 tablet at 06/17/24 0831    vancomycin (Vancocin) 1,000 mg in dextrose 5% water 200 mL, 1,000 mg, intravenous, q12h, ZAFAR Gupta-CNP, Stopped at 06/17/24 0734    vancomycin (Vancocin) pharmacy to dose - pharmacy monitoring, , miscellaneous, Daily PRN, DEBBY Gupta     LABS:  Lab Results   Component Value Date    WBC 8.8 06/17/2024    HGB 11.7 (L) 06/17/2024    HCT 35.6 (L) 06/17/2024    MCV 76 (L) 06/17/2024     06/17/2024      Results from last 72 hours   Lab Units 06/17/24  0455   SODIUM mmol/L 135*   POTASSIUM mmol/L 3.5   CHLORIDE mmol/L 107   CO2 mmol/L 20*   BUN mg/dL 13   CREATININE mg/dL 0.87   GLUCOSE mg/dL 121*   CALCIUM mg/dL 8.4*   ANION GAP mmol/L 12   EGFR mL/min/1.73m*2 89     Results from last 72 hours   Lab Units 06/16/24  1356   ALK PHOS U/L 70   BILIRUBIN TOTAL mg/dL 0.3   PROTEIN TOTAL g/dL 8.4*   ALT U/L 11   AST U/L 13   ALBUMIN g/dL 4.4     Estimated Creatinine Clearance: 85.6 mL/min (by C-G formula based on SCr of 0.87 mg/dL).    IMAGING:  US Pelvis 6/16  IMPRESSION:  1. Single intrauterine pregnancy noted with estimated gestational age  of 5 weeks, 3 days based on mean gestational sac diameter. A definite  fetal pole and or fetal heart tones not identified which may be  secondary to early gestational state.  2. Follow up examination is recommended at 18-20  weeks gestation for  evaluation of fetal anatomy.    ASSESSMENT/PLAN:    HS Flare with Axillary Involvement    IV Vanc/Zosyn for now. Difficult situation. Abx will only do so much until the underlying disease process can be controlled. Perhaps derm input would be helpful since she will be off biologic now.    Monitoring for adverse effects of abx such as rash/itching/diarrhea. Monitoring Vancomycin levels and renal function.    Will follow. Thanks! D/w Dr. Tess Castanon MD  [facility]  Office #[phone]

## 2024-07-02 DIAGNOSIS — K58.0 IRRITABLE BOWEL SYNDROME WITH DIARRHEA: ICD-10-CM

## 2024-07-02 RX ORDER — HYOSCYAMINE SULFATE 0.38 MG/1
TABLET, EXTENDED RELEASE ORAL
Qty: 200 TABLET | Refills: 1 | Status: SHIPPED | OUTPATIENT
Start: 2024-07-02

## 2024-07-08 DIAGNOSIS — F43.10 POSTTRAUMATIC STRESS DISORDER: ICD-10-CM

## 2024-07-08 RX ORDER — PROPRANOLOL HYDROCHLORIDE 10 MG/1
10 TABLET ORAL 2 TIMES DAILY PRN
Qty: 90 TABLET | Refills: 0 | Status: SHIPPED | OUTPATIENT
Start: 2024-07-08

## 2024-07-23 ENCOUNTER — OFFICE VISIT (OUTPATIENT)
Dept: PSYCHIATRY | Facility: CLINIC | Age: 69
End: 2024-07-23
Payer: MEDICARE

## 2024-07-23 VITALS — DIASTOLIC BLOOD PRESSURE: 73 MMHG | HEART RATE: 69 BPM | SYSTOLIC BLOOD PRESSURE: 122 MMHG

## 2024-07-23 DIAGNOSIS — F33.42 MDD (MAJOR DEPRESSIVE DISORDER), RECURRENT, IN FULL REMISSION (HCC): ICD-10-CM

## 2024-07-23 DIAGNOSIS — F43.10 POSTTRAUMATIC STRESS DISORDER: Primary | ICD-10-CM

## 2024-07-23 PROBLEM — M85.80 OSTEOPENIA: Status: ACTIVE | Noted: 2024-01-24

## 2024-07-23 PROCEDURE — 90833 PSYTX W PT W E/M 30 MIN: CPT | Performed by: STUDENT IN AN ORGANIZED HEALTH CARE EDUCATION/TRAINING PROGRAM

## 2024-07-23 PROCEDURE — 99214 OFFICE O/P EST MOD 30 MIN: CPT | Performed by: STUDENT IN AN ORGANIZED HEALTH CARE EDUCATION/TRAINING PROGRAM

## 2024-07-23 RX ORDER — METOPROLOL SUCCINATE 25 MG/1
25 TABLET, EXTENDED RELEASE ORAL DAILY
COMMUNITY
Start: 2024-07-02

## 2024-07-23 RX ORDER — SERTRALINE HYDROCHLORIDE 100 MG/1
200 TABLET, FILM COATED ORAL DAILY
Qty: 180 TABLET | Refills: 1 | Status: SHIPPED | OUTPATIENT
Start: 2024-07-23 | End: 2024-10-21

## 2024-07-23 RX ORDER — PROPRANOLOL HYDROCHLORIDE 10 MG/1
10 TABLET ORAL 2 TIMES DAILY PRN
Qty: 180 TABLET | Refills: 1 | Status: SHIPPED | OUTPATIENT
Start: 2024-07-23

## 2024-07-23 RX ORDER — BUSPIRONE HYDROCHLORIDE 30 MG/1
30 TABLET ORAL 2 TIMES DAILY
Qty: 180 TABLET | Refills: 1 | Status: SHIPPED | OUTPATIENT
Start: 2024-07-23 | End: 2024-10-21

## 2024-07-23 NOTE — BH TREATMENT PLAN
TREATMENT PLAN (Medication Management Only)        [facility] - [facility]YCHI[facility]RIC [facility]    Name and Date of Birth:  Zulema Stanton 69 y.o. 1955  Date of Treatment Plan: July 23, 2024  Diagnosis/Diagnoses:    1. Posttraumatic stress disorder    2. MDD (major depressive disorder), recurrent, in full remission (HCC)      Strengths/Personal Resources for Self-Care: supportive family, supportive friends, taking medications as prescribed, ability to adapt to life changes, ability to communicate needs, ability to communicate well, ability to listen, ability to reason, ability to understand psychiatric illness, average or above intelligence, family ties, financial means, financial security, general fund of knowledge, good physical health, good understanding of illness, independence, motivation for treatment, ability to negotiate basic needs, [employer] affiliation, being resoureceful, self-reliance, sense of humor, special hobby/interest, stable employment, strong pankaj, well educated, willingness to work on problems, work skills.  Area/Areas of need (in own words): anxiety symptoms  1. Long Term Goal: improve anxiety.  Target Date:6 months - 1/23/2025  Person/Persons responsible for completion of goal: Zulema  2.  Short Term Objective (s) - How will we reach this goal?:   A. Provider new recommended medication/dosage changes and/or continue medication(s): continue current medications as prescribed Zoloft, Buspar, Propranolol.  B. Spend more time with friends and family.  C. Attend psychotherapy regularly.  Target Date:6 months - 1/23/2025  Person/Persons Responsible for Completion of Goal: Zulema  Progress Towards Goals: continuing treatment  Treatment Modality: medication management with psychotherapy every 6 month, medication education at every visit  Review due 180 days from date of this plan: 6 months - 1/23/2025  Expected length of service: maintenance  My Physician and I have developed this  plan together and I agree to work on the goals and objectives. I understand the treatment goals that were developed for my treatment.

## 2024-07-23 NOTE — PSYCH
MEDICATION MANAGEMENT NOTE        [facility]      Name and Date of Birth:  Zulema Stanton 69 y.o. 1955 MRN: [id number]    Date of Visit: July 23, 2024    Reason for Visit:   Chief Complaint   Patient presents with    Follow-up       SUBJECTIVE:    Zulema is seen today for a follow up for Major Depressive Disorder and anxiety. . She reports done very well.  Patient is currently on Zoloft 200 mg and BuSpar 30 mg twice a day for anxiety and depression.  Patient has worsening anxiety recently after having an eye surgery for retinal detachment.  Has been utilizing Inderal as needed for anxiety and has been working effectively without side effects.     She denies any suicidal ideation, intent or plan at present; denies any homicidal ideation, intent or plan at present.    She denies any auditory hallucinations, denies any visual hallucinations, denies any overt delusions.    She denies any side effects from psychiatric medications.    HPI ROS Appetite Changes and Sleep:     She reports normal sleep, normal appetite, normal energy level      Review Of Systems:      Constitutional negative   ENT negative   Cardiovascular negative   Respiratory negative   Gastrointestinal negative   Genitourinary negative   Musculoskeletal back pain   Integumentary negative   Neurological negative   Endocrine negative   Other Symptoms none, all other systems are negative         Past Medical History:    Past Medical History:   Diagnosis Date    Arthritis     Back pain     Bipolar disorder (HCC)     Carbuncle and furuncle of foot 02/17/2014    Depression     GERD (gastroesophageal reflux disease)     Hypertension     last assessed: 06/29/12    Osteoarthritis 11/30/2017    Procedure/Onset: 03/01/2017; Description: With Arthritis in right jaw    Osteomyelitis of toe (HCC) 06/25/2014    Osteopenia 01/24/2024    Formatting of this note might be different from the original.   LT TOTAL HIP -1.6  12/15    Sarcoidosis     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        Past Surgical History:   Procedure Laterality Date    BLADDER SURGERY      CATARACT EXTRACTION, BILATERAL      COLPORRHAPHY      Anterior, repair of cystocele    HYSTERECTOMY  1993    at age 38    OOPHORECTOMY Bilateral 1993    at age 38    RETINAL DETACHMENT SURGERY Left      Allergies   Allergen Reactions    Tramadol Itching    Carvedilol Fever and Other (See Comments)    Dust Mite Extract     Influenza Virus Vaccine Visual Disturbance     Reaction Date: 28Feb2012;     Molds & Smuts     Pollen Extract Eye Swelling     Trees, grass    Juan Grass Pollen Allergen     Tree Extract        Substance Abuse History:    Social History     Substance and Sexual Activity   Alcohol Use No     Social History     Substance and Sexual Activity   Drug Use No       Social History:    Social History     Socioeconomic History    Marital status: /Civil Union     Spouse name: Not on file    Number of children: Not on file    Years of education: Not on file    Highest education level: Not on file   Occupational History    Not on file   Tobacco Use    Smoking status: Never    Smokeless tobacco: Never   Vaping Use    Vaping status: Never Used   Substance and Sexual Activity    Alcohol use: No    Drug use: No    Sexual activity: Yes     Partners: Male     Birth control/protection: Post-menopausal   Other Topics Concern    Not on file   Social History Narrative    Not on file     Social Determinants of Health     Financial Resource Strain: Low Risk  (7/5/2023)    Overall Financial Resource Strain (CARDIA)     Difficulty of Paying Living Expenses: Not hard at all   Food Insecurity: Not on file   Transportation Needs: No Transportation Needs (7/5/2023)    PRAPARE - Transportation     Lack of Transportation (Medical): No     Lack of Transportation (Non-Medical): No   Physical Activity: Not on file   Stress: Not on file   Social  Connections: Unknown (6/18/2024)    Received from [facility]     How often do you feel lonely or isolated from those around you? (Adult - for ages 18 years and over): Not on file   Intimate Partner Violence: Not on file   Housing Stability: Not on file       Family Psychiatric History:     Family History   Problem Relation Age of Onset    Aneurysm Mother         Abd aorta aneurysm repair 2 dock limbs w/ visc extension pros    Hypertension Father     Subarachnoid hemorrhage Father     No Known Problems Daughter     No Known Problems Maternal Grandmother     No Known Problems Maternal Grandfather     No Known Problems Paternal Grandmother     No Known Problems Paternal Grandfather     No Known Problems Paternal Aunt     BRCA2 Positive Neg Hx     BRCA2 Negative Neg Hx     BRCA1 Positive Neg Hx     BRCA1 Negative Neg Hx     BRCA 1/2 Neg Hx     Endometrial cancer Neg Hx     Ovarian cancer Neg Hx     Colon cancer Neg Hx     Breast cancer additional onset Neg Hx     Breast cancer Neg Hx        History Review: The following portions of the patient's history were reviewed and updated as appropriate: allergies, current medications, past family history, past medical history, past social history, past surgical history and problem list.         OBJECTIVE:     Vital signs in last 24 hours:    Vitals:    07/23/24 1204   BP: 122/73   BP Location: Right arm   Patient Position: Sitting   Cuff Size: Standard   Pulse: 69         Mental Status Evaluation:    Appearance age appropriate, casually dressed   Behavior cooperative, calm   Speech normal rate, normal volume, normal pitch   Mood improved   Affect normal range and intensity, appropriate   Thought Processes organized, goal directed   Associations intact associations   Thought Content no overt delusions   Perceptual Disturbances: no auditory hallucinations, no visual hallucinations   Abnormal Thoughts  Risk Potential Suicidal ideation - None  Homicidal ideation  - None  Potential for aggression - No   Orientation oriented to person, place, time/date and situation   Memory recent and remote memory grossly intact   Consciousness alert and awake   Attention Span Concentration Span attention span and concentration are age appropriate   Intellect appears to be of average intelligence   Insight intact   Judgement intact   Muscle Strength and  Gait normal muscle strength and normal muscle tone   Motor activity no abnormal movements   Language no difficulty naming common objects, no difficulty repeating a phrase, no difficulty writing a sentence   Fund of Knowledge adequate knowledge of current events  adequate fund of knowledge regarding past history  adequate fund of knowledge regarding vocabulary    Pain moderate   Pain Scale 5       Laboratory Results: I have personally reviewed all pertinent laboratory/tests results    Most Recent Labs:   Lab Results   Component Value Date    WBC 4.54 03/18/2023    RBC 4.07 03/18/2023    HGB 13.5 03/18/2023    HCT 40.8 03/18/2023     03/18/2023    RDW 12.3 03/18/2023    NEUTROABS 2.83 03/18/2023     12/15/2015    K 4.1 03/18/2023     03/18/2023    CO2 28 03/18/2023    BUN 28 (H) 03/18/2023    CREATININE 0.88 03/18/2023    GLUCOSE 96 12/15/2015    CALCIUM 9.0 03/18/2023    AST 23 03/18/2023    ALT 21 03/18/2023    ALKPHOS 49 03/18/2023    PROT 6.5 12/15/2015    BILITOT 0.30 12/15/2015    CHOLESTEROL 173 08/19/2023    TRIG 108 08/19/2023    HDL 84 08/19/2023    LDLCALC 67 08/19/2023    NONHDLC 89 08/19/2023    GZW7JGPXAGAD 3.898 03/18/2023       Suicide/Homicide Risk Assessment:    Risk of Harm to Self:  The following ratings are based on assessment at the time of the interview and review of records  Based on today's assessment, Zulema presents the following risk of harm to self: none    Risk of Harm to Others:  The following ratings are based on assessment at the time of the interview  Based on today's assessment, Zulema  presents the following risk of harm to others: minimal    The following interventions are recommended: no intervention changes needed, not applicable    Assessment/Plan:  Zulema is a 69 years old adult patient was seen today for follow-up her anxiety and depressive symptoms.  With successfully tapered off her lorazepam because she was getting dependent on it and not recommended for her age.  We were able to increase Zoloft and BuSpar to help with her anxiety.  She is responding well to the medication changes and has residual anxiety and depression that are very mild in severity.  The blood work is within normal limits including her vitamin D level.  We will continue Zoloft and BuSpar at maximum doses to continue in remission.  We will continue Inderal for anxiety attacks.        Diagnoses and all orders for this visit:    Posttraumatic stress disorder  -     busPIRone (BUSPAR) 30 MG tablet; Take 1 tablet (30 mg total) by mouth 2 (two) times a day  -     propranolol (INDERAL) 10 mg tablet; Take 1 tablet (10 mg total) by mouth 2 (two) times a day as needed (anxiety) for up to 180 doses  -     sertraline (ZOLOFT) 100 mg tablet; Take 2 tablets (200 mg total) by mouth daily    MDD (major depressive disorder), recurrent, in full remission (HCC)    Other orders  -     metoprolol succinate (TOPROL-XL) 25 mg 24 hr tablet; Take 25 mg by mouth daily              Treatment Recommendations/Precautions:    Continue Zoloft 200 mg daily to improve depressive symptoms  Continue Buspar 30 mg 2 times a day to improve anxiety symptoms  Continue Inderal 10 mg as needed for anxiety attacks  Medication changes: I am having Zulema Stanton maintain her calcium citrate-vitamin D, Multiple Vitamin (DAILY VALUE MULTIVITAMIN PO), Mavik, Vitamin D3, carboxymethylcellulose, Omega-3 Fish Oil, mometasone, b complex vitamins, GenTeal, Carboxymethylcellulose Sodium, ondansetron, prednisoLONE acetate, loperamide, Osteo Advance, Multivitamin Adults 50+,  B Complex 100 TR, busPIRone, sertraline, hyoscyamine, propranolol, and metoprolol succinate.    Current Outpatient Medications:     b complex vitamins capsule, Take 2 capsules by mouth daily, Disp: , Rfl:     B Complex-Biotin-FA (B Complex 100 TR) TBCR, Take by mouth, Disp: , Rfl:     busPIRone (BUSPAR) 30 MG tablet, TAKE ONE (1) TABLET (30 MG TOTAL) BY MOUTH TWO (2) TIMES A DAY, Disp: 360 tablet, Rfl: 0    calcium citrate-vitamin D (CITRACAL+D) 315-200 MG-UNIT per tablet, Take 2 tablets by mouth Twice daily OsteoMatrix, Disp: , Rfl:     Carboxymethylcell-Hypromellose (GenTeal) 0.25-0.3 % GEL, Apply to eye daily at bedtime, Disp: , Rfl:     carboxymethylcellulose (REFRESH PLUS) 0.5 % SOLN, Apply to eye, Disp: , Rfl:     Carboxymethylcellulose Sodium 1 % GEL, Apply to eye, Disp: , Rfl:     Cholecalciferol (VITAMIN D3) 1000 units CAPS, Take by mouth, Disp: , Rfl:     hyoscyamine (LEVBID) 0.375 mg 12 hr tablet, Take 1 tablet by mouth 2 times a day, Disp: 200 tablet, Rfl: 1    loperamide (Anti-Diarrheal) 2 MG tablet, TAKE ONE TABLET BY MOUTH IN THE MORNING AND ONE-HALF TABLET IN THE EVENING, Disp: 144 tablet, Rfl: 0    metoprolol succinate (TOPROL-XL) 25 mg 24 hr tablet, Take 25 mg by mouth daily, Disp: , Rfl:     mometasone (NASONEX) 50 mcg/act nasal spray, , Disp: , Rfl:     Multiple Vitamin (DAILY VALUE MULTIVITAMIN PO), Take 1 tablet by mouth in the morning, Disp: , Rfl:     Multiple Vitamins-Minerals (Multivitamin Adults 50+) TABS, Take by mouth, Disp: , Rfl:     Nutritional Supplements (Osteo Advance) TABS, Take by mouth, Disp: , Rfl:     Omega-3 Fatty Acids (OMEGA-3 FISH OIL) 1200 MG CAPS, [place] OmegaGuard fish oil, Disp: , Rfl:     ondansetron (ZOFRAN) 4 mg tablet, Take 1 tablet (4 mg total) by mouth every 8 (eight) hours as needed for nausea or vomiting, Disp: 30 tablet, Rfl: 0    prednisoLONE acetate (PRED FORTE) 1 % ophthalmic suspension, Administer 1 drop to both eyes 3 (three) times a day, Disp: , Rfl:      propranolol (INDERAL) 10 mg tablet, Take 1 tablet (10 mg total) by mouth 2 (two) times a day as needed (anxiety) for up to 90 doses, Disp: 90 tablet, Rfl: 0    sertraline (ZOLOFT) 100 mg tablet, TAKE TWO (2) TABLETS (200 MG TOTAL) BY MOUTH DAILY, Disp: 360 tablet, Rfl: 0    trandolapril (Mavik) 2 MG tablet, Take 1 tablet by mouth daily  , Disp: , Rfl:   Zulema has a current medication list which includes the following prescription(s): b complex vitamins, b complex 100 tr, buspirone, calcium citrate-vitamin d, genteal, carboxymethylcellulose, carboxymethylcellulose sodium, vitamin d3, hyoscyamine, loperamide, metoprolol succinate, mometasone, multiple vitamin, multivitamin adults 50+, osteo advance, omega-3 fish oil, ondansetron, prednisolone acetate, propranolol, sertraline, and mavik.  Medication management with psychotherapy every 2 months  Aware of 24 hour and weekend coverage for urgent situations accessed by calling [facility] main practice number    Medications Risks/Benefits      Risks, Benefits And Possible Side Effects Of Medications:    Risks, benefits, and possible side effects of medications explained to Zulema and she verbalizes understanding and agreement for treatment.    Controlled Medication Discussion:     Zulema has been filling controlled prescriptions on time as prescribed according to [place] Prescription Drug Monitoring Program    Psychotherapy Provided:     Individual psychotherapy provided: Yes  Counseling was provided during the session today for 16 minutes.  Medications, treatment progress and treatment plan reviewed with Zulema.  Medication changes discussed with Zulema.  Medication education provided to Zulema.  Importance of medication and treatment compliance reviewed with Zulema.  Educated on importance of medication and treatment compliance.  Supportive therapy provided.   Cognitive therapy was utilized during the session.  Reassurance and supportive  therapy provided.   Reoriented to reality and reassured.      Treatment Plan:    Completed and signed during the session: Not applicable - Treatment Plan not due at this session    Note Share:    This note was not shared with the patient due to reasonable likelihood of causing patient harm    Visit start and stop times:    Start Time:  11:20 AM  Stop Time:  11:40 AM    I spent 20 minutes directly with the patient during this visit    Kimmy Hrut MD 07/23/24

## 2024-08-26 ENCOUNTER — OFFICE VISIT (OUTPATIENT)
Dept: FAMILY MEDICINE CLINIC | Facility: CLINIC | Age: 69
End: 2024-08-26
Payer: MEDICARE

## 2024-08-26 VITALS
HEIGHT: 68 IN | DIASTOLIC BLOOD PRESSURE: 58 MMHG | HEART RATE: 64 BPM | TEMPERATURE: 96.8 F | BODY MASS INDEX: 20.46 KG/M2 | WEIGHT: 135 LBS | OXYGEN SATURATION: 93 % | SYSTOLIC BLOOD PRESSURE: 104 MMHG

## 2024-08-26 DIAGNOSIS — I42.0 PRIMARY DILATED CARDIOMYOPATHY (HCC): ICD-10-CM

## 2024-08-26 DIAGNOSIS — N18.30 STAGE 3 CHRONIC KIDNEY DISEASE, UNSPECIFIED WHETHER STAGE 3A OR 3B CKD (HCC): ICD-10-CM

## 2024-08-26 DIAGNOSIS — M54.16 CHRONIC LUMBAR RADICULOPATHY: ICD-10-CM

## 2024-08-26 DIAGNOSIS — F33.1 MDD (MAJOR DEPRESSIVE DISORDER), RECURRENT EPISODE, MODERATE (HCC): ICD-10-CM

## 2024-08-26 DIAGNOSIS — M46.1 SACROILIITIS (HCC): ICD-10-CM

## 2024-08-26 DIAGNOSIS — E44.0 MODERATE PROTEIN-CALORIE MALNUTRITION (HCC): ICD-10-CM

## 2024-08-26 DIAGNOSIS — Z00.00 MEDICARE ANNUAL WELLNESS VISIT, SUBSEQUENT: ICD-10-CM

## 2024-08-26 DIAGNOSIS — M81.6 LOCALIZED OSTEOPOROSIS (LEQUESNE): ICD-10-CM

## 2024-08-26 DIAGNOSIS — Z13.820 ENCOUNTER FOR SCREENING FOR OSTEOPOROSIS: Primary | ICD-10-CM

## 2024-08-26 PROCEDURE — G0439 PPPS, SUBSEQ VISIT: HCPCS | Performed by: FAMILY MEDICINE

## 2024-08-26 NOTE — PROGRESS NOTES
Ambulatory Visit  Name: Zulema Stanton      : 1955      MRN: [id number]  Encounter Provider: Kenneth Lala DO  Encounter Date: 2024   Encounter department: [facility]    Assessment & Plan   1. Encounter for screening for osteoporosis  2. Chronic lumbar radiculopathy       Preventive health issues were discussed with patient, and age appropriate screening tests were ordered as noted in patient's After Visit Summary. Personalized health advice and appropriate referrals for health education or preventive services given if needed, as noted in patient's After Visit Summary.    History of Present Illness     Mrs. Stanton is here for a follow-up Medicare wellness visit and any acute problems that are identified will be addressed       Patient Care Team:  Kenneth Lala DO as PCP - General (Family Medicine)    Review of Systems   Constitutional:  Negative for activity change, appetite change, diaphoresis, fatigue and fever.   HENT: Negative.  Negative for dental problem and hearing loss.    Eyes:  Positive for visual disturbance.   Respiratory:  Negative for apnea, cough, chest tightness, shortness of breath and wheezing.    Cardiovascular:  Negative for chest pain, palpitations and leg swelling.   Gastrointestinal:  Negative for abdominal distention, abdominal pain, anal bleeding, constipation, diarrhea, nausea and vomiting.   Endocrine: Negative for cold intolerance, heat intolerance, polydipsia, polyphagia and polyuria.   Genitourinary:  Negative for difficulty urinating, dysuria, flank pain, hematuria and urgency.   Musculoskeletal:  Negative for arthralgias, back pain, gait problem, joint swelling and myalgias.   Skin:  Negative for color change, rash and wound.   Allergic/Immunologic: Negative for environmental allergies, food allergies and immunocompromised state.   Neurological:  Negative for dizziness, seizures, syncope, speech difficulty, numbness and headaches.   Hematological:  Negative for  adenopathy. Does not bruise/bleed easily.   Psychiatric/Behavioral:  Negative for agitation, behavioral problems, hallucinations, sleep disturbance and suicidal ideas.      Medical History Reviewed by provider this encounter:       Annual Wellness Visit Questionnaire   Zulema is here for her Subsequent Wellness visit.     Health Risk Assessment:   Patient rates overall health as good. Patient feels that their physical health rating is same. Patient is satisfied with their life. Eyesight was rated as much worse. Hearing was rated as same. Patient feels that their emotional and mental health rating is slightly better. Patients states they are sometimes angry. Patient states they are sometimes unusually tired/fatigued. Pain experienced in the last 7 days has been none. Patient states that she has experienced no weight loss or gain in last 6 months.     Depression Screening:   PHQ-9 Score: 0      Fall Risk Screening:   In the past year, patient has experienced: no history of falling in past year      Urinary Incontinence Screening:   Patient has not leaked urine accidently in the last six months.     Home Safety:  Patient has trouble with stairs inside or outside of their home. Patient has working smoke alarms and has no working carbon monoxide detector. Home safety hazards include: none.     Nutrition:   Current diet is Regular.     Medications:   Patient is currently taking over-the-counter supplements. OTC medications include: see medication list. Patient is able to manage medications.     Activities of Daily Living (ADLs)/Instrumental Activities of Daily Living (IADLs):   Walk and transfer into and out of bed and chair?: Yes  Dress and groom yourself?: Yes    Bathe or shower yourself?: Yes    Feed yourself? Yes  Do your laundry/housekeeping?: Yes  Manage your money, pay your bills and track your expenses?: Yes  Make your own meals?: Yes    Do your own shopping?: Yes    Previous Hospitalizations:   Any  hospitalizations or ED visits within the last 12 months?: No      Advance Care Planning:   Living will: No    Durable POA for healthcare: No    Advanced directive: No    Advanced directive counseling given: Yes    ACP document given: Yes    Patient declined ACP directive: Yes    End of Life Decisions reviewed with patient: Yes    Provider agrees with end of life decisions: Yes      Cognitive Screening:   Provider or family/friend/caregiver concerned regarding cognition?: No    PREVENTIVE SCREENINGS      Cardiovascular Screening:    General: Screening Current      Colorectal Cancer Screening:     General: Screening Current      Breast Cancer Screening:     General: Screening Current      Cervical Cancer Screening:    General: Screening Not Indicated      Lung Cancer Screening:     General: Screening Not Indicated      Hepatitis C Screening:    General: Screening Current    Screening, Brief Intervention, and Referral to Treatment (SBIRT)    Screening  Typical number of drinks in a day: 0  Typical number of drinks in a week: 0  Interpretation: Low risk drinking behavior.    AUDIT-C Screenin) How often did you have a drink containing alcohol in the past year? never  2) How many drinks did you have on a typical day when you were drinking in the past year? 0  3) How often did you have 6 or more drinks on one occasion in the past year? never    AUDIT-C Score: 0  Interpretation: Score 0-2 (female): Negative screen for alcohol misuse    Single Item Drug Screening:  How often have you used an illegal drug (including marijuana) or a prescription medication for non-medical reasons in the past year? never    Single Item Drug Screen Score: 0  Interpretation: Negative screen for possible drug use disorder    Social Determinants of Health     Financial Resource Strain: Low Risk  (2023)    Overall Financial Resource Strain (CARDIA)     Difficulty of Paying Living Expenses: Not hard at all   Transportation Needs: No  "Transportation Needs (7/5/2023)    PRAPARE - Transportation     Lack of Transportation (Medical): No     Lack of Transportation (Non-Medical): No   Housing Stability: Low Risk  (8/19/2024)    Housing Stability Vital Sign     Unable to Pay for Housing in the Last Year: No     Number of Times Moved in the Last Year: 0     Homeless in the Last Year: No   Utilities: Not At Risk (8/19/2024)    [facility] Utilities     Threatened with loss of utilities: No     No results found.    Objective     /58 (BP Location: Left arm, Patient Position: Sitting, Cuff Size: Standard)   Pulse 64   Temp (!) 96.8 °F (36 °C) (Temporal)   Ht 5' 8\" (1.727 m)   Wt 61.2 kg (135 lb)   SpO2 93%   BMI 20.53 kg/m²     Physical Exam  Constitutional:       Appearance: Normal appearance.   HENT:      Head: Normocephalic and atraumatic.      Nose: Nose normal.   Eyes:      Extraocular Movements: Extraocular movements intact.      Conjunctiva/sclera: Conjunctivae normal.      Pupils: Pupils are equal, round, and reactive to light.   Cardiovascular:      Rate and Rhythm: Normal rate and regular rhythm.   Pulmonary:      Effort: Pulmonary effort is normal.      Breath sounds: Normal breath sounds.   Abdominal:      General: Abdomen is flat.      Palpations: Abdomen is soft.   Musculoskeletal:      Cervical back: Normal range of motion.      Comments: Lumbar back pain   Neurological:      General: No focal deficit present.      Mental Status: She is oriented to person, place, and time.   Psychiatric:         Mood and Affect: Mood normal.         Behavior: Behavior normal.         Thought Content: Thought content normal.         Judgment: Judgment normal.           "

## 2024-11-30 DIAGNOSIS — K58.0 IRRITABLE BOWEL SYNDROME WITH DIARRHEA: ICD-10-CM

## 2024-11-30 DIAGNOSIS — F43.10 POSTTRAUMATIC STRESS DISORDER: ICD-10-CM

## 2024-12-02 RX ORDER — SERTRALINE HYDROCHLORIDE 100 MG/1
TABLET, FILM COATED ORAL
Qty: 180 TABLET | Refills: 0 | Status: SHIPPED | OUTPATIENT
Start: 2024-12-02

## 2024-12-02 RX ORDER — HYOSCYAMINE SULFATE 0.38 MG/1
TABLET, EXTENDED RELEASE ORAL
Qty: 200 TABLET | Refills: 1 | Status: SHIPPED | OUTPATIENT
Start: 2024-12-02

## 2024-12-03 DIAGNOSIS — F43.10 POSTTRAUMATIC STRESS DISORDER: ICD-10-CM

## 2024-12-04 RX ORDER — BUSPIRONE HYDROCHLORIDE 30 MG/1
TABLET ORAL
Qty: 180 TABLET | Refills: 1 | Status: SHIPPED | OUTPATIENT
Start: 2024-12-04

## 2025-01-14 ENCOUNTER — TELEPHONE (OUTPATIENT)
Dept: PSYCHIATRY | Facility: CLINIC | Age: 70
End: 2025-01-14

## 2025-01-14 NOTE — TELEPHONE ENCOUNTER
I tried reaching out to the patient several times but someone would  and hang up so I reached out to the patients daughter and LM for her to reschedule the appt on 1/22

## 2025-02-19 ENCOUNTER — OFFICE VISIT (OUTPATIENT)
Dept: PSYCHIATRY | Facility: CLINIC | Age: 70
End: 2025-02-19
Payer: MEDICARE

## 2025-02-19 DIAGNOSIS — F33.42 MDD (MAJOR DEPRESSIVE DISORDER), RECURRENT, IN FULL REMISSION (HCC): Primary | ICD-10-CM

## 2025-02-19 DIAGNOSIS — Z13.1 ENCOUNTER FOR SCREENING FOR DIABETES MELLITUS: ICD-10-CM

## 2025-02-19 DIAGNOSIS — M46.1 SACROILIITIS (HCC): ICD-10-CM

## 2025-02-19 DIAGNOSIS — M54.16 CHRONIC LUMBAR RADICULOPATHY: ICD-10-CM

## 2025-02-19 DIAGNOSIS — E55.9 VITAMIN D DEFICIENCY: ICD-10-CM

## 2025-02-19 DIAGNOSIS — F43.10 POSTTRAUMATIC STRESS DISORDER: ICD-10-CM

## 2025-02-19 DIAGNOSIS — E64.0 SEQUELAE OF PROTEIN-CALORIE MALNUTRITION (HCC): ICD-10-CM

## 2025-02-19 DIAGNOSIS — F41.9 ANXIETY: ICD-10-CM

## 2025-02-19 PROBLEM — Z90.710 H/O: HYSTERECTOMY: Status: RESOLVED | Noted: 2023-10-24 | Resolved: 2025-02-19

## 2025-02-19 PROBLEM — F33.1 MDD (MAJOR DEPRESSIVE DISORDER), RECURRENT EPISODE, MODERATE (HCC): Status: RESOLVED | Noted: 2024-08-26 | Resolved: 2025-02-19

## 2025-02-19 PROBLEM — E46 PROTEIN CALORIE MALNUTRITION (HCC): Status: RESOLVED | Noted: 2022-07-26 | Resolved: 2025-02-19

## 2025-02-19 PROCEDURE — 90833 PSYTX W PT W E/M 30 MIN: CPT | Performed by: STUDENT IN AN ORGANIZED HEALTH CARE EDUCATION/TRAINING PROGRAM

## 2025-02-19 PROCEDURE — 99214 OFFICE O/P EST MOD 30 MIN: CPT | Performed by: STUDENT IN AN ORGANIZED HEALTH CARE EDUCATION/TRAINING PROGRAM

## 2025-02-19 RX ORDER — ERYTHROMYCIN 5 MG/G
OINTMENT OPHTHALMIC
COMMUNITY
Start: 2025-01-21

## 2025-02-19 RX ORDER — BUSPIRONE HYDROCHLORIDE 30 MG/1
30 TABLET ORAL 2 TIMES DAILY
Qty: 180 TABLET | Refills: 1 | Status: SHIPPED | OUTPATIENT
Start: 2025-02-19 | End: 2025-08-18

## 2025-02-19 RX ORDER — SERTRALINE HYDROCHLORIDE 100 MG/1
200 TABLET, FILM COATED ORAL DAILY
Qty: 180 TABLET | Refills: 1 | Status: SHIPPED | OUTPATIENT
Start: 2025-02-19 | End: 2025-08-18

## 2025-02-19 NOTE — BH TREATMENT PLAN
TREATMENT PLAN (Medication Management Only)        [facility]    Name and Date of Birth:  Zulema Stanton 70 y.o. 1955  Date of Treatment Plan: February 19, 2025  Diagnosis/Diagnoses:    1. MDD (major depressive disorder), recurrent, in full remission (HCC)    2. Posttraumatic stress disorder    3. Anxiety    4. Vitamin D deficiency    5. Chronic lumbar radiculopathy    6. Sacroiliitis (HCC)    7. Sequelae of protein-calorie malnutrition (HCC)    8. Encounter for screening for diabetes mellitus      Strengths/Personal Resources for Self-Care: supportive family, supportive friends, taking medications as prescribed, ability to adapt to life changes, ability to communicate needs, ability to communicate well, ability to listen, ability to reason, ability to understand psychiatric illness, average or above intelligence, family ties, financial means, financial security, general fund of knowledge, good physical health, good understanding of illness, independence, motivation for treatment, ability to negotiate basic needs, [employer] affiliation, being resoureceful, self-reliance, sense of humor, special hobby/interest, stable employment, strong pankaj, well educated, willingness to work on problems, work skills.  Area/Areas of need (in own words): anxiety symptoms  1. Long Term Goal: improve anxiety.  Target Date:6 months - 8/19/2025  Person/Persons responsible for completion of goal: Zulema  2.  Short Term Objective (s) - How will we reach this goal?:   A. Provider new recommended medication/dosage changes and/or continue medication(s): continue current medications as prescribed Zoloft, Buspar, Propranolol.  B. Spend more time with friends and family.  C. Attend psychotherapy regularly.  Target Date:6 months - 8/19/2025  Person/Persons Responsible for Completion of Goal: Zulema  Progress Towards Goals: continuing treatment  Treatment Modality: medication management with  psychotherapy every 6 month, medication education at every visit  Review due 180 days from date of this plan: 6 months - 8/19/2025  Expected length of service: maintenance  My Physician and I have developed this plan together and I agree to work on the goals and objectives. I understand the treatment goals that were developed for my treatment.

## 2025-02-19 NOTE — BH CRISIS PLAN
"Client Name: Zulema Stanton       Client YOB: 1955  : 1955     Treatment Team (include name and contact information):     Psychotherapist: None    Psychiatrist: Kimmy Hurt   Release of information completed: yes      Healthcare Provider  Kenneth Lala DO  [address] [facility] [address]  [phone]    Type of Plan   * Child plans (children 14 yo and younger) must be completed and signed by the child's legal guardian   * Plans for all individuals 13 yo and above must be signed by the client.     Plan Type: adolescent/adult (14 and over) Initial      My Personal Strengths are (in the client's own words):  \"Organizational skills\"  The stressors and triggers that may put me at risk are:  anniversary of brother death, chronic anxiety, chronic mental illness and chronic pain    Coping skills I can use to keep myself calm and safe:  Listen to music and Other (describe) coloring     Coping skills/supports I can use to maintain abstinence from substance use:   keeping myself busy at home, keeping myself busy at work and family support    The people that provide me with help and support: (Include name, contact, and how they can help)   Support person #1:      * Phone number: in cell phone    * How can they help me? Talk her out    Support person #2:Daughter    * Phone number: in cell phone    * How can they help me? Same        In the past, the following has helped me in times of crisis:    Calling a family member      If it is an emergency and you need immediate help, call     If there is a possibility of danger to yourself or others, call the following crisis hotline resources:     Adult Crisis Numbers  Suicide Prevention Hotline - Dial   [facility]: [phone]  [facility]: [phone]  [facility]: [phone]  [facility]: [phone]  [facility]: [phone]  [facility]: [phone]  [facility]: " [phone]  [facility]: [phone] (daytime).       [phone] (after hours, weekends, holidays)     Child/Adolescent Crisis Numbers   [facility]: [phone]   [facility]: [phone]   [place]: [phone]   [place]: [phone]    Please note: Some [facility] do not have a separate number for Child/Adolescent specific crisis. If your county is not listed under Child/Adolescent, please call the adult number for your county     National Talk to Text Line   All Ages - [phone]    In the event your feelings become unmanageable, and you cannot reach your support system, you will call 911 immediately or go to the nearest hospital emergency room.

## 2025-02-19 NOTE — PSYCH
MEDICATION MANAGEMENT NOTE        [facility]      Name and Date of Birth:  Zulema Stanton 70 y.o. 1955 MRN: [id number]    Date of Visit: February 19, 2025    Reason for Visit:   Chief Complaint   Patient presents with    Follow-up       SUBJECTIVE:    Zulema is seen today for a follow up for Major Depressive Disorder and anxiety. . She reports done very well.  Patient is currently on Zoloft 200 mg and BuSpar 30 mg twice a day for anxiety and depression.  Has been utilizing Inderal as needed for anxiety and has been working effectively without side effects.     She denies any suicidal ideation, intent or plan at present; denies any homicidal ideation, intent or plan at present.    She denies any auditory hallucinations, denies any visual hallucinations, denies any overt delusions.    She denies any side effects from psychiatric medications.    HPI ROS Appetite Changes and Sleep:     She reports normal sleep, normal appetite, normal energy level      Review Of Systems:      Constitutional negative   ENT negative   Cardiovascular negative   Respiratory negative   Gastrointestinal negative   Genitourinary negative   Musculoskeletal back pain   Integumentary negative   Neurological negative   Endocrine negative   Other Symptoms none, all other systems are negative         Past Medical History:    Past Medical History:   Diagnosis Date    Allergic     flu vaccine    Arthritis     Back pain     Bipolar disorder (HCC)     Carbuncle and furuncle of foot 02/17/2014    Coronary artery disease     ideopathic cardiomyopathy    Depression     GERD (gastroesophageal reflux disease)     Hypertension     last assessed: 06/29/12    Inflammatory bowel disease     Osteoarthritis 11/30/2017    Procedure/Onset: 03/01/2017; Description: With Arthritis in right jaw    Osteomyelitis of toe (HCC) 06/25/2014    Osteopenia 01/24/2024    Formatting of this note might be different from the  original.   LT TOTAL HIP -1.6 12/15    Sarcoidosis     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        Past Surgical History:   Procedure Laterality Date    BLADDER SURGERY      CATARACT EXTRACTION, BILATERAL      COLPORRHAPHY      Anterior, repair of cystocele    EYE SURGERY      two(2) tears in retina in (L) eye    HYSTERECTOMY  1993    at age 38    OOPHORECTOMY Bilateral 1993    at age 38    RETINAL DETACHMENT SURGERY Right 04/2024    Detachment     Allergies   Allergen Reactions    Tramadol Itching    Carvedilol Fever and Other (See Comments)    Dust Mite Extract     Influenza Virus Vaccine Visual Disturbance     Reaction Date: 28Feb2012;     Molds & Smuts     Pollen Extract Eye Swelling     Trees, grass    Juan Grass Pollen Allergen     Tree Extract        Substance Abuse History:    Social History     Substance and Sexual Activity   Alcohol Use No     Social History     Substance and Sexual Activity   Drug Use No       Social History:    Social History     Socioeconomic History    Marital status: /Civil Union     Spouse name: Not on file    Number of children: Not on file    Years of education: Not on file    Highest education level: Not on file   Occupational History    Not on file   Tobacco Use    Smoking status: Never    Smokeless tobacco: Never   Vaping Use    Vaping status: Never Used   Substance and Sexual Activity    Alcohol use: No    Drug use: No    Sexual activity: Yes     Partners: Male     Birth control/protection: Post-menopausal   Other Topics Concern    Not on file   Social History Narrative    Not on file     Social Drivers of Health     Financial Resource Strain: Low Risk  (7/5/2023)    Overall Financial Resource Strain (CARDIA)     Difficulty of Paying Living Expenses: Not hard at all   Food Insecurity: No Food Insecurity (8/26/2024)    Nursing - Inadequate Food Risk Classification     Worried About Running Out of Food in the Last Year: Never true     Ran  Out of Food in the Last Year: Never true     Ran Out of Food in the Last Year: Not on file   Transportation Needs: No Transportation Needs (8/26/2024)    PRAPARE - Transportation     Lack of Transportation (Medical): No     Lack of Transportation (Non-Medical): No   Physical Activity: Not on file   Stress: Not on file   Social Connections: Unknown (6/18/2024)    Received from [employer]    Social [employer]     How often do you feel lonely or isolated from those around you? (Adult - for ages 18 years and over): Not on file   Intimate Partner Violence: Not on file   Housing Stability: Low Risk  (8/26/2024)    Housing Stability Vital Sign     Unable to Pay for Housing in the Last Year: No     Number of Times Moved in the Last Year: 0     Homeless in the Last Year: No       Family Psychiatric History:     Family History   Problem Relation Age of Onset    Aneurysm Mother         Abd aorta aneurysm repair 2 dock limbs w/ visc extension pros    Hypertension Father     Subarachnoid hemorrhage Father     No Known Problems Daughter     No Known Problems Maternal Grandmother     No Known Problems Maternal Grandfather     No Known Problems Paternal Grandmother     No Known Problems Paternal Grandfather     No Known Problems Paternal Aunt     BRCA2 Positive Neg Hx     BRCA2 Negative Neg Hx     BRCA1 Positive Neg Hx     BRCA1 Negative Neg Hx     BRCA 1/2 Neg Hx     Endometrial cancer Neg Hx     Ovarian cancer Neg Hx     Colon cancer Neg Hx     Breast cancer additional onset Neg Hx     Breast cancer Neg Hx        History Review: The following portions of the patient's history were reviewed and updated as appropriate: allergies, current medications, past family history, past medical history, past social history, past surgical history and problem list.         OBJECTIVE:     Vital signs in last 24 hours:    There were no vitals filed for this visit.        Mental Status Evaluation:    Appearance age appropriate, casually dressed    Behavior cooperative, calm   Speech normal rate, normal volume, normal pitch   Mood improved   Affect normal range and intensity, appropriate   Thought Processes organized, goal directed   Associations intact associations   Thought Content no overt delusions   Perceptual Disturbances: no auditory hallucinations, no visual hallucinations   Abnormal Thoughts  Risk Potential Suicidal ideation - None  Homicidal ideation - None  Potential for aggression - No   Orientation oriented to person, place, time/date and situation   Memory recent and remote memory grossly intact   Consciousness alert and awake   Attention Span Concentration Span attention span and concentration are age appropriate   Intellect appears to be of average intelligence   Insight intact   Judgement intact   Muscle Strength and  Gait normal muscle strength and normal muscle tone   Motor activity no abnormal movements   Language no difficulty naming common objects, no difficulty repeating a phrase, no difficulty writing a sentence   Fund of Knowledge adequate knowledge of current events  adequate fund of knowledge regarding past history  adequate fund of knowledge regarding vocabulary    Pain moderate   Pain Scale 5       Laboratory Results: I have personally reviewed all pertinent laboratory/tests results    Most Recent Labs:   Lab Results   Component Value Date    WBC 4.54 03/18/2023    RBC 4.07 03/18/2023    HGB 13.5 03/18/2023    HCT 40.8 03/18/2023     03/18/2023    RDW 12.3 03/18/2023    NEUTROABS 2.83 03/18/2023     12/15/2015    K 4.1 03/18/2023     03/18/2023    CO2 28 03/18/2023    BUN 28 (H) 03/18/2023    CREATININE 0.88 03/18/2023    GLUCOSE 96 12/15/2015    CALCIUM 9.0 03/18/2023    AST 23 03/18/2023    ALT 21 03/18/2023    ALKPHOS 49 03/18/2023    PROT 6.5 12/15/2015    BILITOT 0.30 12/15/2015    CHOLESTEROL 173 08/19/2023    TRIG 108 08/19/2023    HDL 84 08/19/2023    LDLCALC 67 08/19/2023    NONHDLC 89 08/19/2023     [id number] 3.898 03/18/2023       Suicide/Homicide Risk Assessment:    Risk of Harm to Self:  The following ratings are based on assessment at the time of the interview and review of records  Based on today's assessment, Zulema presents the following risk of harm to self: none    Risk of Harm to Others:  The following ratings are based on assessment at the time of the interview  Based on today's assessment, Zulema presents the following risk of harm to others: minimal    The following interventions are recommended: no intervention changes needed, not applicable    Assessment/Plan:  Zulema is a 70 years old adult patient was seen today for follow-up her anxiety and depressive symptoms.  With successfully tapered off her lorazepam because she was getting dependent on it and not recommended for her age.  We were able to increase Zoloft and BuSpar to help with her anxiety.  She is responding well to the medication changes and has residual anxiety and depression that are very mild in severity. We will continue Zoloft and BuSpar at maximum doses to continue in remission.  We will continue Inderal for anxiety attacks.        Diagnoses and all orders for this visit:    MDD (major depressive disorder), recurrent, in full remission (HCC)  -     CBC and differential; Future  -     Comprehensive metabolic panel; Future  -     Lipid Panel with Direct LDL reflex; Future  -     Hemoglobin A1C; Future  -     Vitamin D 25 hydroxy; Future  -     Thyroid Panel With TSH; Future    Posttraumatic stress disorder  -     sertraline (ZOLOFT) 100 mg tablet; Take 2 tablets (200 mg total) by mouth daily  -     busPIRone (BUSPAR) 30 MG tablet; Take 1 tablet (30 mg total) by mouth 2 (two) times a day  -     CBC and differential; Future  -     Comprehensive metabolic panel; Future  -     Lipid Panel with Direct LDL reflex; Future  -     Hemoglobin A1C; Future  -     Vitamin D 25 hydroxy; Future  -     Thyroid Panel With TSH;  Future    Anxiety  -     CBC and differential; Future  -     Comprehensive metabolic panel; Future  -     Lipid Panel with Direct LDL reflex; Future  -     Hemoglobin A1C; Future  -     Vitamin D 25 hydroxy; Future  -     Thyroid Panel With TSH; Future    Vitamin D deficiency  -     CBC and differential; Future  -     Comprehensive metabolic panel; Future  -     Lipid Panel with Direct LDL reflex; Future  -     Hemoglobin A1C; Future  -     Vitamin D 25 hydroxy; Future  -     Thyroid Panel With TSH; Future    Chronic lumbar radiculopathy  -     CBC and differential; Future  -     Comprehensive metabolic panel; Future  -     Lipid Panel with Direct LDL reflex; Future  -     Hemoglobin A1C; Future  -     Vitamin D 25 hydroxy; Future  -     Thyroid Panel With TSH; Future    Sacroiliitis (HCC)  -     CBC and differential; Future  -     Comprehensive metabolic panel; Future  -     Lipid Panel with Direct LDL reflex; Future  -     Hemoglobin A1C; Future  -     Vitamin D 25 hydroxy; Future  -     Thyroid Panel With TSH; Future    Sequelae of protein-calorie malnutrition (HCC)  -     Lipid Panel with Direct LDL reflex; Future    Encounter for screening for diabetes mellitus  -     Hemoglobin A1C; Future    Other orders  -     erythromycin (ILOTYCIN) ophthalmic ointment                Treatment Recommendations/Precautions:    Continue Zoloft 200 mg daily to improve depressive symptoms  Continue Buspar 30 mg 2 times a day to improve anxiety symptoms  Continue Inderal 10 mg as needed for anxiety attacks  Medication changes: I am having Zulema Stanton maintain her calcium citrate-vitamin D, Multiple Vitamin (DAILY VALUE MULTIVITAMIN PO), Mavik, Vitamin D3, Omega-3 Fish Oil, b complex vitamins, ondansetron, loperamide, Osteo Advance, Multivitamin Adults 50+, B Complex 100 TR, metoprolol succinate, propranolol, Propylene Glycol (SYSTANE COMPLETE OP), sertraline, hyoscyamine, busPIRone, and erythromycin.    Current Outpatient  Medications:     erythromycin (ILOTYCIN) ophthalmic ointment, , Disp: , Rfl:     b complex vitamins capsule, Take 2 capsules by mouth daily, Disp: , Rfl:     B Complex-Biotin-FA (B Complex 100 TR) TBCR, Take by mouth, Disp: , Rfl:     busPIRone (BUSPAR) 30 MG tablet, TAKE ONE (1) TABLET (30 MG TOTAL) BY MOUTH TWO (2) (TWO) TIMES A DAY, Disp: 180 tablet, Rfl: 1    calcium citrate-vitamin D (CITRACAL+D) 315-200 MG-UNIT per tablet, Take 2 tablets by mouth Twice daily OsteoMatrix, Disp: , Rfl:     Cholecalciferol (VITAMIN D3) 1000 units CAPS, Take by mouth, Disp: , Rfl:     hyoscyamine (LEVBID) 0.375 mg 12 hr tablet, TAKE ONE (1) TABLET BY MOUTH TWO (2) TIMES A DAY, Disp: 200 tablet, Rfl: 1    loperamide (Anti-Diarrheal) 2 MG tablet, TAKE ONE TABLET BY MOUTH IN THE MORNING AND ONE-HALF TABLET IN THE EVENING, Disp: 144 tablet, Rfl: 0    metoprolol succinate (TOPROL-XL) 25 mg 24 hr tablet, Take 25 mg by mouth daily, Disp: , Rfl:     Multiple Vitamin (DAILY VALUE MULTIVITAMIN PO), Take 1 tablet by mouth in the morning, Disp: , Rfl:     Multiple Vitamins-Minerals (Multivitamin Adults 50+) TABS, Take by mouth, Disp: , Rfl:     Nutritional Supplements (Osteo Advance) TABS, Take by mouth, Disp: , Rfl:     Omega-3 Fatty Acids (OMEGA-3 FISH OIL) 1200 MG CAPS, [place] OmegaGuard fish oil, Disp: , Rfl:     ondansetron (ZOFRAN) 4 mg tablet, Take 1 tablet (4 mg total) by mouth every 8 (eight) hours as needed for nausea or vomiting, Disp: 30 tablet, Rfl: 0    propranolol (INDERAL) 10 mg tablet, Take 1 tablet (10 mg total) by mouth 2 (two) times a day as needed (anxiety) for up to 180 doses, Disp: 180 tablet, Rfl: 1    Propylene Glycol (SYSTANE COMPLETE OP), , Disp: , Rfl:     sertraline (ZOLOFT) 100 mg tablet, TAKE TWO (2) TABLETS (200 MG TOTAL) BY MOUTH DAILY, Disp: 180 tablet, Rfl: 0    trandolapril (Mavik) 2 MG tablet, Take 1 tablet by mouth daily  , Disp: , Rfl:   Zulema has a current medication list which includes the following  prescription(s): erythromycin, b complex vitamins, b complex 100 tr, buspirone, calcium citrate-vitamin d, vitamin d3, hyoscyamine, loperamide, metoprolol succinate, multiple vitamin, multivitamin adults 50+, osteo advance, omega-3 fish oil, ondansetron, propranolol, propylene glycol, sertraline, and mavik.  Medication management with psychotherapy every 2 months  Aware of 24 hour and weekend coverage for urgent situations accessed by calling [facility] main practice number    Medications Risks/Benefits      Risks, Benefits And Possible Side Effects Of Medications:    Risks, benefits, and possible side effects of medications explained to Zulema and she verbalizes understanding and agreement for treatment.    Controlled Medication Discussion:     Zulema has been filling controlled prescriptions on time as prescribed according to [place] Prescription Drug Monitoring Program    Psychotherapy Provided:     Individual psychotherapy provided: Yes  Counseling was provided during the session today for 16 minutes.  Medications, treatment progress and treatment plan reviewed with Zulema.  Medication changes discussed with Zulema.  Medication education provided to Zulema.  Importance of medication and treatment compliance reviewed with Zulema.  Educated on importance of medication and treatment compliance.  Supportive therapy provided.   Cognitive therapy was utilized during the session.  Reassurance and supportive therapy provided.   Reoriented to reality and reassured.      Treatment Plan:    Completed and signed during the session: Not applicable - Treatment Plan not due at this session    Note Share:    This note was not shared with the patient due to reasonable likelihood of causing patient harm    Visit start and stop times:    Start Time:  12:30 PM  Stop Time:  12:50 PM    I spent 20 minutes directly with the patient during this visit    Kimmy Hurt MD 02/19/25

## 2025-03-06 DIAGNOSIS — F43.10 POSTTRAUMATIC STRESS DISORDER: ICD-10-CM

## 2025-03-06 RX ORDER — PROPRANOLOL HYDROCHLORIDE 10 MG/1
TABLET ORAL
Qty: 180 TABLET | Refills: 1 | Status: SHIPPED | OUTPATIENT
Start: 2025-03-06

## 2025-03-18 ENCOUNTER — APPOINTMENT (OUTPATIENT)
Dept: LAB | Facility: MEDICAL CENTER | Age: 70
End: 2025-03-18
Payer: MEDICARE

## 2025-03-18 DIAGNOSIS — M54.16 CHRONIC LUMBAR RADICULOPATHY: ICD-10-CM

## 2025-03-18 DIAGNOSIS — Z13.1 ENCOUNTER FOR SCREENING FOR DIABETES MELLITUS: ICD-10-CM

## 2025-03-18 DIAGNOSIS — F33.42 MDD (MAJOR DEPRESSIVE DISORDER), RECURRENT, IN FULL REMISSION (HCC): ICD-10-CM

## 2025-03-18 DIAGNOSIS — M46.1 SACROILIITIS (HCC): Primary | ICD-10-CM

## 2025-03-18 DIAGNOSIS — F43.10 POSTTRAUMATIC STRESS DISORDER: ICD-10-CM

## 2025-03-18 DIAGNOSIS — E64.0 SEQUELAE OF PROTEIN-CALORIE MALNUTRITION (HCC): ICD-10-CM

## 2025-03-18 DIAGNOSIS — F41.9 ANXIETY: ICD-10-CM

## 2025-03-18 DIAGNOSIS — E55.9 VITAMIN D DEFICIENCY: ICD-10-CM

## 2025-03-18 LAB
25(OH)D3 SERPL-MCNC: 63 NG/ML (ref 30–100)
ALBUMIN SERPL BCG-MCNC: 4 G/DL (ref 3.5–5)
ALP SERPL-CCNC: 54 U/L (ref 34–104)
ALT SERPL W P-5'-P-CCNC: 20 U/L (ref 7–52)
ANION GAP SERPL CALCULATED.3IONS-SCNC: 8 MMOL/L (ref 4–13)
AST SERPL W P-5'-P-CCNC: 20 U/L (ref 13–39)
BASOPHILS # BLD AUTO: 0.04 THOUSANDS/ÂΜL (ref 0–0.1)
BASOPHILS NFR BLD AUTO: 1 % (ref 0–1)
BILIRUB SERPL-MCNC: 0.43 MG/DL (ref 0.2–1)
BUN SERPL-MCNC: 26 MG/DL (ref 5–25)
CALCIUM SERPL-MCNC: 9 MG/DL (ref 8.4–10.2)
CHLORIDE SERPL-SCNC: 103 MMOL/L (ref 96–108)
CHOLEST SERPL-MCNC: 174 MG/DL (ref ?–200)
CO2 SERPL-SCNC: 28 MMOL/L (ref 21–32)
CREAT SERPL-MCNC: 0.7 MG/DL (ref 0.6–1.3)
EOSINOPHIL # BLD AUTO: 0.08 THOUSAND/ÂΜL (ref 0–0.61)
EOSINOPHIL NFR BLD AUTO: 2 % (ref 0–6)
ERYTHROCYTE [DISTWIDTH] IN BLOOD BY AUTOMATED COUNT: 12.4 % (ref 11.6–15.1)
EST. AVERAGE GLUCOSE BLD GHB EST-MCNC: 114 MG/DL
GFR SERPL CREATININE-BSD FRML MDRD: 87 ML/MIN/1.73SQ M
GLUCOSE P FAST SERPL-MCNC: 97 MG/DL (ref 65–99)
HBA1C MFR BLD: 5.6 %
HCT VFR BLD AUTO: 38.5 % (ref 34.8–46.1)
HDLC SERPL-MCNC: 74 MG/DL
HGB BLD-MCNC: 12.7 G/DL (ref 11.5–15.4)
IMM GRANULOCYTES # BLD AUTO: 0.02 THOUSAND/UL (ref 0–0.2)
IMM GRANULOCYTES NFR BLD AUTO: 0 % (ref 0–2)
LDLC SERPL CALC-MCNC: 78 MG/DL (ref 0–100)
LYMPHOCYTES # BLD AUTO: 0.96 THOUSANDS/ÂΜL (ref 0.6–4.47)
LYMPHOCYTES NFR BLD AUTO: 18 % (ref 14–44)
MCH RBC QN AUTO: 33.1 PG (ref 26.8–34.3)
MCHC RBC AUTO-ENTMCNC: 33 G/DL (ref 31.4–37.4)
MCV RBC AUTO: 100 FL (ref 82–98)
MONOCYTES # BLD AUTO: 0.45 THOUSAND/ÂΜL (ref 0.17–1.22)
MONOCYTES NFR BLD AUTO: 9 % (ref 4–12)
NEUTROPHILS # BLD AUTO: 3.73 THOUSANDS/ÂΜL (ref 1.85–7.62)
NEUTS SEG NFR BLD AUTO: 70 % (ref 43–75)
NRBC BLD AUTO-RTO: 0 /100 WBCS
PLATELET # BLD AUTO: 235 THOUSANDS/UL (ref 149–390)
PMV BLD AUTO: 10 FL (ref 8.9–12.7)
POTASSIUM SERPL-SCNC: 4.3 MMOL/L (ref 3.5–5.3)
PROT SERPL-MCNC: 6.1 G/DL (ref 6.4–8.4)
RBC # BLD AUTO: 3.84 MILLION/UL (ref 3.81–5.12)
SODIUM SERPL-SCNC: 139 MMOL/L (ref 135–147)
T4 FREE SERPL-MCNC: 0.62 NG/DL (ref 0.61–1.12)
T4 SERPL-MCNC: 5.57 UG/DL (ref 6.09–12.23)
TRIGL SERPL-MCNC: 108 MG/DL (ref ?–150)
TSH SERPL DL<=0.05 MIU/L-ACNC: 3.45 UIU/ML (ref 0.45–4.5)
WBC # BLD AUTO: 5.28 THOUSAND/UL (ref 4.31–10.16)

## 2025-03-18 PROCEDURE — 80061 LIPID PANEL: CPT

## 2025-03-18 PROCEDURE — 82306 VITAMIN D 25 HYDROXY: CPT

## 2025-03-18 PROCEDURE — 80053 COMPREHEN METABOLIC PANEL: CPT

## 2025-03-18 PROCEDURE — 84439 ASSAY OF FREE THYROXINE: CPT

## 2025-03-18 PROCEDURE — 85025 COMPLETE CBC W/AUTO DIFF WBC: CPT

## 2025-03-18 PROCEDURE — 84443 ASSAY THYROID STIM HORMONE: CPT

## 2025-03-18 PROCEDURE — 84436 ASSAY OF TOTAL THYROXINE: CPT

## 2025-03-18 PROCEDURE — 36415 COLL VENOUS BLD VENIPUNCTURE: CPT

## 2025-03-18 PROCEDURE — 84479 ASSAY OF THYROID (T3 OR T4): CPT

## 2025-03-18 PROCEDURE — 83036 HEMOGLOBIN GLYCOSYLATED A1C: CPT

## 2025-03-20 LAB — T3RU NFR SERPL: 25 % (ref 24–39)

## 2025-06-13 ENCOUNTER — APPOINTMENT (OUTPATIENT)
Dept: LAB | Facility: HOSPITAL | Age: 70
End: 2025-06-13
Payer: MEDICARE

## 2025-06-13 DIAGNOSIS — I51.9 HEART DISEASE, UNSPECIFIED: ICD-10-CM

## 2025-06-13 DIAGNOSIS — I35.1 NONRHEUMATIC AORTIC (VALVE) INSUFFICIENCY: ICD-10-CM

## 2025-06-13 LAB
ANION GAP SERPL CALCULATED.3IONS-SCNC: 7 MMOL/L (ref 4–13)
BUN SERPL-MCNC: 23 MG/DL (ref 5–25)
CALCIUM SERPL-MCNC: 9.2 MG/DL (ref 8.4–10.2)
CHLORIDE SERPL-SCNC: 103 MMOL/L (ref 96–108)
CO2 SERPL-SCNC: 29 MMOL/L (ref 21–32)
CREAT SERPL-MCNC: 0.91 MG/DL (ref 0.6–1.3)
GFR SERPL CREATININE-BSD FRML MDRD: 63 ML/MIN/1.73SQ M
GLUCOSE SERPL-MCNC: 97 MG/DL (ref 65–140)
POTASSIUM SERPL-SCNC: 4.2 MMOL/L (ref 3.5–5.3)
SODIUM SERPL-SCNC: 139 MMOL/L (ref 135–147)

## 2025-06-13 PROCEDURE — 80048 BASIC METABOLIC PNL TOTAL CA: CPT

## 2025-06-13 PROCEDURE — 36415 COLL VENOUS BLD VENIPUNCTURE: CPT

## 2025-06-26 ENCOUNTER — OFFICE VISIT (OUTPATIENT)
Dept: PAIN MEDICINE | Facility: MEDICAL CENTER | Age: 70
End: 2025-06-26
Payer: MEDICARE

## 2025-06-26 VITALS — HEIGHT: 68 IN | BODY MASS INDEX: 21.52 KG/M2 | WEIGHT: 142 LBS

## 2025-06-26 DIAGNOSIS — M46.1 SACROILIITIS (HCC): ICD-10-CM

## 2025-06-26 DIAGNOSIS — M54.16 CHRONIC LUMBAR RADICULOPATHY: Primary | ICD-10-CM

## 2025-06-26 PROCEDURE — 99213 OFFICE O/P EST LOW 20 MIN: CPT | Performed by: PHYSICAL MEDICINE & REHABILITATION

## 2025-06-26 RX ORDER — EPLERENONE 25 MG/1
25 TABLET ORAL
COMMUNITY
Start: 2025-05-19 | End: 2026-05-14

## 2025-06-26 NOTE — PROGRESS NOTES
Name: Zulema Stanton      : 1955      MRN: [id number]  Encounter Provider: Greg Hancock DO  Encounter Date: 2025   Encounter department: [facility] [place]  :  Assessment & Plan  Chronic lumbar radiculopathy         Sacroiliitis (HCC)           Patient who recently experienced exacerbation of back and radiating pain into the posterior thighs which fortunately is improving with conservative care including supporting the back with a pillow when seated and utilizing Tylenol.  She could consider aqua therapy as an additional treatment but certainly does not have to at this point.  I would like to see her back if she has any worsening of symptoms.      My impressions and treatment recommendations were discussed in detail with the patient who verbalized understanding and had no further questions.  Discharge instructions were provided. I personally saw and examined the patient and I agree with the above discussed plan of care.    History of Present Illness     Zulema Stanton is a 70 y.o. female seen today after acute exacerbation of chronic back pain.  Currently the pain is resolved fortunately and has improved since using Tylenol and supporting her back with a pillow while seated.    Pain was worse and rated as a 5-7/10 when it was bothering her and described as intermittent sharp pain across her back with radicular features into the posterior thighs.  This was affecting her daily activities.  She noted difficulty walking and decreased range of motion.    Review of Systems   Respiratory:  Negative for shortness of breath.    Cardiovascular:  Negative for chest pain.   Gastrointestinal:  Negative for constipation, diarrhea, nausea and vomiting.   Musculoskeletal:  Positive for back pain, gait problem and myalgias. Negative for arthralgias and joint swelling.   Skin:  Negative for rash.   Neurological:  Negative for dizziness, seizures and weakness.   All other systems reviewed and are  "negative.    Medical History Reviewed by provider this encounter:     .       Objective   Ht 5' 8\" (1.727 m)   Wt 64.4 kg (142 lb)   BMI 21.59 kg/m²      Pain Score:   7  Physical Exam  Constitutional: normal, well developed, well nourished, alert, in no distress and non-toxic and no overt pain behavior.  Eyes: anicteric  HEENT: grossly intact  Neck: supple, symmetric, trachea midline and no masses   Pulmonary: even and unlabored  Cardiovascular: No edema or pitting edema present  Skin: Normal without rashes or lesions and well hydrated  Psychiatric: Mood and affect appropriate  Neurologic: Cranial Nerves II-XII grossly intact  Musculoskeletal: normal          "

## 2025-07-13 DIAGNOSIS — K58.0 IRRITABLE BOWEL SYNDROME WITH DIARRHEA: ICD-10-CM

## 2025-07-16 RX ORDER — HYOSCYAMINE SULFATE 0.38 MG/1
0.38 TABLET, EXTENDED RELEASE ORAL 2 TIMES DAILY
Qty: 200 TABLET | Refills: 1 | Status: SHIPPED | OUTPATIENT
Start: 2025-07-16

## 2025-08-19 DIAGNOSIS — F43.10 POSTTRAUMATIC STRESS DISORDER: ICD-10-CM

## 2025-08-20 ENCOUNTER — TELEMEDICINE (OUTPATIENT)
Dept: PSYCHIATRY | Facility: CLINIC | Age: 70
End: 2025-08-20
Payer: MEDICARE

## 2025-08-20 DIAGNOSIS — F33.42 MDD (MAJOR DEPRESSIVE DISORDER), RECURRENT, IN FULL REMISSION (HCC): Primary | ICD-10-CM

## 2025-08-20 DIAGNOSIS — F43.10 POSTTRAUMATIC STRESS DISORDER: ICD-10-CM

## 2025-08-20 DIAGNOSIS — F41.9 ANXIETY: ICD-10-CM

## 2025-08-20 PROCEDURE — 99214 OFFICE O/P EST MOD 30 MIN: CPT | Performed by: STUDENT IN AN ORGANIZED HEALTH CARE EDUCATION/TRAINING PROGRAM

## 2025-08-20 RX ORDER — SERTRALINE HYDROCHLORIDE 100 MG/1
200 TABLET, FILM COATED ORAL DAILY
Qty: 180 TABLET | Refills: 1 | Status: SHIPPED | OUTPATIENT
Start: 2025-08-20 | End: 2026-02-16

## 2025-08-20 RX ORDER — BUSPIRONE HYDROCHLORIDE 30 MG/1
30 TABLET ORAL 2 TIMES DAILY
Qty: 180 TABLET | Refills: 1 | Status: SHIPPED | OUTPATIENT
Start: 2025-08-20

## 2025-08-20 RX ORDER — PROPRANOLOL HYDROCHLORIDE 10 MG/1
10 TABLET ORAL 2 TIMES DAILY PRN
Qty: 180 TABLET | Refills: 1 | Status: SHIPPED | OUTPATIENT
Start: 2025-08-20

## 2025-08-20 RX ORDER — METOPROLOL SUCCINATE 25 MG/1
25 TABLET, EXTENDED RELEASE ORAL DAILY
COMMUNITY
Start: 2025-01-01 | End: 2025-08-20

## 2025-08-20 RX ORDER — BUSPIRONE HYDROCHLORIDE 30 MG/1
30 TABLET ORAL 2 TIMES DAILY
Qty: 180 TABLET | Refills: 1 | Status: SHIPPED | OUTPATIENT
Start: 2025-08-20 | End: 2025-08-20 | Stop reason: SDUPTHER